# Patient Record
Sex: MALE | Race: WHITE | NOT HISPANIC OR LATINO
[De-identification: names, ages, dates, MRNs, and addresses within clinical notes are randomized per-mention and may not be internally consistent; named-entity substitution may affect disease eponyms.]

---

## 2017-04-17 ENCOUNTER — APPOINTMENT (OUTPATIENT)
Dept: CARDIOLOGY | Facility: CLINIC | Age: 52
End: 2017-04-17

## 2017-04-17 ENCOUNTER — NON-APPOINTMENT (OUTPATIENT)
Age: 52
End: 2017-04-17

## 2017-04-17 VITALS
HEIGHT: 70 IN | OXYGEN SATURATION: 97 % | SYSTOLIC BLOOD PRESSURE: 130 MMHG | DIASTOLIC BLOOD PRESSURE: 90 MMHG | WEIGHT: 294 LBS | HEART RATE: 82 BPM | BODY MASS INDEX: 42.09 KG/M2

## 2017-04-17 VITALS — SYSTOLIC BLOOD PRESSURE: 126 MMHG | DIASTOLIC BLOOD PRESSURE: 80 MMHG

## 2017-04-17 DIAGNOSIS — R07.89 OTHER CHEST PAIN: ICD-10-CM

## 2017-04-17 DIAGNOSIS — E66.01 MORBID (SEVERE) OBESITY DUE TO EXCESS CALORIES: ICD-10-CM

## 2017-04-17 RX ORDER — ASPIRIN 81 MG
81 TABLET, DELAYED RELEASE (ENTERIC COATED) ORAL DAILY
Qty: 30 | Refills: 2 | Status: ACTIVE | COMMUNITY

## 2017-04-18 LAB
ALBUMIN SERPL ELPH-MCNC: 4.4 G/DL
ALP BLD-CCNC: 83 U/L
ALT SERPL-CCNC: 49 U/L
ANION GAP SERPL CALC-SCNC: 15 MMOL/L
AST SERPL-CCNC: 67 U/L
BASOPHILS # BLD AUTO: 0.02 K/UL
BASOPHILS NFR BLD AUTO: 0.2 %
BILIRUB SERPL-MCNC: 0.9 MG/DL
BUN SERPL-MCNC: 24 MG/DL
CALCIUM SERPL-MCNC: 9.7 MG/DL
CHLORIDE SERPL-SCNC: 103 MMOL/L
CHOLEST SERPL-MCNC: 167 MG/DL
CHOLEST/HDLC SERPL: 5.2 RATIO
CO2 SERPL-SCNC: 27 MMOL/L
CREAT SERPL-MCNC: 1.31 MG/DL
EOSINOPHIL # BLD AUTO: 0.41 K/UL
EOSINOPHIL NFR BLD AUTO: 4.9 %
GLUCOSE SERPL-MCNC: 98 MG/DL
HBA1C MFR BLD HPLC: 6.3 %
HCT VFR BLD CALC: 42.4 %
HDLC SERPL-MCNC: 32 MG/DL
HGB BLD-MCNC: 14.3 G/DL
IMM GRANULOCYTES NFR BLD AUTO: 0.2 %
LDLC SERPL CALC-MCNC: 80 MG/DL
LYMPHOCYTES # BLD AUTO: 2.06 K/UL
LYMPHOCYTES NFR BLD AUTO: 24.7 %
MAN DIFF?: NORMAL
MCHC RBC-ENTMCNC: 32.5 PG
MCHC RBC-ENTMCNC: 33.7 GM/DL
MCV RBC AUTO: 96.4 FL
MONOCYTES # BLD AUTO: 0.67 K/UL
MONOCYTES NFR BLD AUTO: 8 %
NEUTROPHILS # BLD AUTO: 5.16 K/UL
NEUTROPHILS NFR BLD AUTO: 62 %
PLATELET # BLD AUTO: 302 K/UL
POTASSIUM SERPL-SCNC: 4.4 MMOL/L
PROT SERPL-MCNC: 7 G/DL
PSA FREE FLD-MCNC: 40 %
PSA FREE SERPL-MCNC: 0.32 NG/ML
PSA SERPL-MCNC: 0.79 NG/ML
RBC # BLD: 4.4 M/UL
RBC # FLD: 13.2 %
SODIUM SERPL-SCNC: 145 MMOL/L
TRIGL SERPL-MCNC: 274 MG/DL
TSH SERPL-ACNC: 4.45 UIU/ML
WBC # FLD AUTO: 8.34 K/UL

## 2017-04-20 ENCOUNTER — LABORATORY RESULT (OUTPATIENT)
Age: 52
End: 2017-04-20

## 2017-04-21 ENCOUNTER — APPOINTMENT (OUTPATIENT)
Dept: CARDIOLOGY | Facility: CLINIC | Age: 52
End: 2017-04-21

## 2017-04-24 ENCOUNTER — APPOINTMENT (OUTPATIENT)
Dept: CARDIOLOGY | Facility: CLINIC | Age: 52
End: 2017-04-24

## 2017-04-24 ENCOUNTER — MEDICATION RENEWAL (OUTPATIENT)
Age: 52
End: 2017-04-24

## 2017-04-24 LAB
APPEARANCE: ABNORMAL
BILIRUBIN URINE: NEGATIVE
BLOOD URINE: NEGATIVE
COLOR: YELLOW
CREAT SPEC-SCNC: 158 MG/DL
GLUCOSE QUALITATIVE U: NORMAL MG/DL
KETONES URINE: NEGATIVE
LEUKOCYTE ESTERASE URINE: NEGATIVE
MICROALBUMIN 24H UR DL<=1MG/L-MCNC: 7.2 MG/DL
MICROALBUMIN/CREAT 24H UR-RTO: 45 UG/MG
NITRITE URINE: NEGATIVE
PH URINE: 5
PROTEIN URINE: NEGATIVE MG/DL
SPECIFIC GRAVITY URINE: 1.02
UROBILINOGEN URINE: 1 MG/DL

## 2017-05-08 ENCOUNTER — APPOINTMENT (OUTPATIENT)
Dept: CARDIOLOGY | Facility: CLINIC | Age: 52
End: 2017-05-08

## 2017-05-24 ENCOUNTER — APPOINTMENT (OUTPATIENT)
Dept: CARDIOLOGY | Facility: CLINIC | Age: 52
End: 2017-05-24

## 2017-08-09 ENCOUNTER — INPATIENT (INPATIENT)
Facility: HOSPITAL | Age: 52
LOS: 0 days | Discharge: HOME | End: 2017-08-10
Attending: INTERNAL MEDICINE | Admitting: INTERNAL MEDICINE
Payer: COMMERCIAL

## 2017-08-09 VITALS
HEART RATE: 88 BPM | TEMPERATURE: 99 F | OXYGEN SATURATION: 99 % | HEIGHT: 70 IN | SYSTOLIC BLOOD PRESSURE: 151 MMHG | WEIGHT: 296.08 LBS | DIASTOLIC BLOOD PRESSURE: 83 MMHG | RESPIRATION RATE: 18 BRPM

## 2017-08-09 DIAGNOSIS — Z95.5 PRESENCE OF CORONARY ANGIOPLASTY IMPLANT AND GRAFT: Chronic | ICD-10-CM

## 2017-08-09 DIAGNOSIS — Z98.890 OTHER SPECIFIED POSTPROCEDURAL STATES: Chronic | ICD-10-CM

## 2017-08-09 LAB
ALBUMIN SERPL ELPH-MCNC: 3.7 G/DL — SIGNIFICANT CHANGE UP (ref 3.3–5)
ALP SERPL-CCNC: 110 U/L — SIGNIFICANT CHANGE UP (ref 40–120)
ALT FLD-CCNC: 39 U/L — SIGNIFICANT CHANGE UP (ref 12–78)
ANION GAP SERPL CALC-SCNC: 8 MMOL/L — SIGNIFICANT CHANGE UP (ref 5–17)
AST SERPL-CCNC: 41 U/L — HIGH (ref 15–37)
BASOPHILS # BLD AUTO: 0.1 K/UL — SIGNIFICANT CHANGE UP (ref 0–0.2)
BASOPHILS NFR BLD AUTO: 0.8 % — SIGNIFICANT CHANGE UP (ref 0–2)
BILIRUB SERPL-MCNC: 0.9 MG/DL — SIGNIFICANT CHANGE UP (ref 0.2–1.2)
BUN SERPL-MCNC: 26 MG/DL — HIGH (ref 7–23)
CALCIUM SERPL-MCNC: 9.3 MG/DL — SIGNIFICANT CHANGE UP (ref 8.5–10.1)
CHLORIDE SERPL-SCNC: 106 MMOL/L — SIGNIFICANT CHANGE UP (ref 96–108)
CK SERPL-CCNC: 255 U/L — SIGNIFICANT CHANGE UP (ref 26–308)
CO2 SERPL-SCNC: 27 MMOL/L — SIGNIFICANT CHANGE UP (ref 22–31)
CREAT SERPL-MCNC: 1.61 MG/DL — HIGH (ref 0.5–1.3)
D DIMER BLD IA.RAPID-MCNC: 155 NG/ML DDU — SIGNIFICANT CHANGE UP
EOSINOPHIL # BLD AUTO: 0.4 K/UL — SIGNIFICANT CHANGE UP (ref 0–0.5)
EOSINOPHIL NFR BLD AUTO: 4.1 % — SIGNIFICANT CHANGE UP (ref 0–6)
GLUCOSE SERPL-MCNC: 129 MG/DL — HIGH (ref 70–99)
HCT VFR BLD CALC: 42.8 % — SIGNIFICANT CHANGE UP (ref 39–50)
HGB BLD-MCNC: 15.2 G/DL — SIGNIFICANT CHANGE UP (ref 13–17)
INR BLD: 0.95 RATIO — SIGNIFICANT CHANGE UP (ref 0.88–1.16)
LYMPHOCYTES # BLD AUTO: 2.6 K/UL — SIGNIFICANT CHANGE UP (ref 1–3.3)
LYMPHOCYTES # BLD AUTO: 26 % — SIGNIFICANT CHANGE UP (ref 13–44)
MCHC RBC-ENTMCNC: 32.6 PG — SIGNIFICANT CHANGE UP (ref 27–34)
MCHC RBC-ENTMCNC: 35.5 GM/DL — SIGNIFICANT CHANGE UP (ref 32–36)
MCV RBC AUTO: 91.9 FL — SIGNIFICANT CHANGE UP (ref 80–100)
MONOCYTES # BLD AUTO: 0.9 K/UL — SIGNIFICANT CHANGE UP (ref 0–0.9)
MONOCYTES NFR BLD AUTO: 9.2 % — SIGNIFICANT CHANGE UP (ref 2–14)
NEUTROPHILS # BLD AUTO: 6.1 K/UL — SIGNIFICANT CHANGE UP (ref 1.8–7.4)
NEUTROPHILS NFR BLD AUTO: 59.8 % — SIGNIFICANT CHANGE UP (ref 43–77)
PLATELET # BLD AUTO: 350 K/UL — SIGNIFICANT CHANGE UP (ref 150–400)
POTASSIUM SERPL-MCNC: 3.4 MMOL/L — LOW (ref 3.5–5.3)
POTASSIUM SERPL-SCNC: 3.4 MMOL/L — LOW (ref 3.5–5.3)
PROT SERPL-MCNC: 7.3 GM/DL — SIGNIFICANT CHANGE UP (ref 6–8.3)
PROTHROM AB SERPL-ACNC: 10.2 SEC — SIGNIFICANT CHANGE UP (ref 9.8–12.7)
RBC # BLD: 4.66 M/UL — SIGNIFICANT CHANGE UP (ref 4.2–5.8)
RBC # FLD: 11.8 % — SIGNIFICANT CHANGE UP (ref 10.3–14.5)
SODIUM SERPL-SCNC: 141 MMOL/L — SIGNIFICANT CHANGE UP (ref 135–145)
TROPONIN I SERPL-MCNC: <0.015 NG/ML — SIGNIFICANT CHANGE UP (ref 0.01–0.04)
TROPONIN I SERPL-MCNC: <0.015 NG/ML — SIGNIFICANT CHANGE UP (ref 0.01–0.04)
WBC # BLD: 10.2 K/UL — SIGNIFICANT CHANGE UP (ref 3.8–10.5)
WBC # FLD AUTO: 10.2 K/UL — SIGNIFICANT CHANGE UP (ref 3.8–10.5)

## 2017-08-09 PROCEDURE — 99285 EMERGENCY DEPT VISIT HI MDM: CPT

## 2017-08-09 PROCEDURE — 71010: CPT | Mod: 26

## 2017-08-09 PROCEDURE — 93010 ELECTROCARDIOGRAM REPORT: CPT

## 2017-08-09 PROCEDURE — 93306 TTE W/DOPPLER COMPLETE: CPT | Mod: 26

## 2017-08-09 RX ORDER — LOSARTAN POTASSIUM 100 MG/1
100 TABLET, FILM COATED ORAL DAILY
Qty: 0 | Refills: 0 | Status: DISCONTINUED | OUTPATIENT
Start: 2017-08-09 | End: 2017-08-10

## 2017-08-09 RX ORDER — ATORVASTATIN CALCIUM 80 MG/1
40 TABLET, FILM COATED ORAL AT BEDTIME
Qty: 0 | Refills: 0 | Status: DISCONTINUED | OUTPATIENT
Start: 2017-08-09 | End: 2017-08-10

## 2017-08-09 RX ORDER — ENOXAPARIN SODIUM 100 MG/ML
40 INJECTION SUBCUTANEOUS EVERY 24 HOURS
Qty: 0 | Refills: 0 | Status: DISCONTINUED | OUTPATIENT
Start: 2017-08-09 | End: 2017-08-10

## 2017-08-09 RX ORDER — ALPRAZOLAM 0.25 MG
0.5 TABLET ORAL ONCE
Qty: 0 | Refills: 0 | Status: DISCONTINUED | OUTPATIENT
Start: 2017-08-09 | End: 2017-08-10

## 2017-08-09 RX ORDER — ASPIRIN/CALCIUM CARB/MAGNESIUM 324 MG
81 TABLET ORAL DAILY
Qty: 0 | Refills: 0 | Status: DISCONTINUED | OUTPATIENT
Start: 2017-08-09 | End: 2017-08-10

## 2017-08-09 RX ORDER — ASPIRIN/CALCIUM CARB/MAGNESIUM 324 MG
325 TABLET ORAL ONCE
Qty: 0 | Refills: 0 | Status: COMPLETED | OUTPATIENT
Start: 2017-08-09 | End: 2017-08-09

## 2017-08-09 RX ORDER — POTASSIUM CHLORIDE 20 MEQ
40 PACKET (EA) ORAL ONCE
Qty: 0 | Refills: 0 | Status: COMPLETED | OUTPATIENT
Start: 2017-08-09 | End: 2017-08-09

## 2017-08-09 RX ORDER — ONDANSETRON 8 MG/1
4 TABLET, FILM COATED ORAL EVERY 6 HOURS
Qty: 0 | Refills: 0 | Status: DISCONTINUED | OUTPATIENT
Start: 2017-08-09 | End: 2017-08-10

## 2017-08-09 RX ORDER — SODIUM CHLORIDE 9 MG/ML
1000 INJECTION INTRAMUSCULAR; INTRAVENOUS; SUBCUTANEOUS
Qty: 0 | Refills: 0 | Status: DISCONTINUED | OUTPATIENT
Start: 2017-08-09 | End: 2017-08-10

## 2017-08-09 RX ORDER — SODIUM CHLORIDE 9 MG/ML
3 INJECTION INTRAMUSCULAR; INTRAVENOUS; SUBCUTANEOUS ONCE
Qty: 0 | Refills: 0 | Status: COMPLETED | OUTPATIENT
Start: 2017-08-09 | End: 2017-08-09

## 2017-08-09 RX ORDER — CLOPIDOGREL BISULFATE 75 MG/1
75 TABLET, FILM COATED ORAL DAILY
Qty: 0 | Refills: 0 | Status: DISCONTINUED | OUTPATIENT
Start: 2017-08-09 | End: 2017-08-10

## 2017-08-09 RX ORDER — ACETAMINOPHEN 500 MG
650 TABLET ORAL EVERY 6 HOURS
Qty: 0 | Refills: 0 | Status: DISCONTINUED | OUTPATIENT
Start: 2017-08-09 | End: 2017-08-10

## 2017-08-09 RX ADMIN — CLOPIDOGREL BISULFATE 75 MILLIGRAM(S): 75 TABLET, FILM COATED ORAL at 21:58

## 2017-08-09 RX ADMIN — SODIUM CHLORIDE 150 MILLILITER(S): 9 INJECTION INTRAMUSCULAR; INTRAVENOUS; SUBCUTANEOUS at 10:05

## 2017-08-09 RX ADMIN — LOSARTAN POTASSIUM 100 MILLIGRAM(S): 100 TABLET, FILM COATED ORAL at 10:02

## 2017-08-09 RX ADMIN — ENOXAPARIN SODIUM 40 MILLIGRAM(S): 100 INJECTION SUBCUTANEOUS at 10:01

## 2017-08-09 RX ADMIN — ATORVASTATIN CALCIUM 40 MILLIGRAM(S): 80 TABLET, FILM COATED ORAL at 21:58

## 2017-08-09 RX ADMIN — Medication 325 MILLIGRAM(S): at 07:18

## 2017-08-09 RX ADMIN — SODIUM CHLORIDE 3 MILLILITER(S): 9 INJECTION INTRAMUSCULAR; INTRAVENOUS; SUBCUTANEOUS at 03:30

## 2017-08-09 RX ADMIN — Medication 40 MILLIEQUIVALENT(S): at 10:02

## 2017-08-09 NOTE — H&P ADULT - NSHPLABSRESULTS_GEN_ALL_CORE
15.2   10.2  )-----------( 350      ( 09 Aug 2017 00:50 )             42.8     141  |  106  |  26<H>  ----------------------------<  129<H>  3.4<L>   |  27  |  1.61<H>    Ca    9.3      09 Aug 2017 00:50    TPro  7.3  /  Alb  3.7  /  TBili  0.9  /  DBili  x   /  AST  41<H>  /  ALT  39  /  AlkPhos  110  08-09    CARDIAC MARKERS ( 09 Aug 2017 03:32 )  <0.015 ng/mL / x     / x     / x     / x      CARDIAC MARKERS ( 09 Aug 2017 00:50 )  <0.015 ng/mL / x     / 255 U/L / x     / x        LIVER FUNCTIONS - ( 09 Aug 2017 00:50 )  Alb: 3.7 g/dL / Pro: 7.3 gm/dL / ALK PHOS: 110 U/L / ALT: 39 U/L / AST: 41 U/L / GGT: x           PT/INR - ( 09 Aug 2017 00:50 )   PT: 10.2 sec;   INR: 0.95 ratio

## 2017-08-09 NOTE — H&P ADULT - HISTORY OF PRESENT ILLNESS
Pt is a 53 yo M hx of CAD, MI s/p stent in 2014 with c/o chest pain.  Pt states that yesterday he had episodes of chest pain in the Right upper chest that were sharp and got worse with deep breaths, would come intermittently.  He was not concerned that this was related to his heart.  He later developed some pain in the upper back, states it reminded him of his heart attack and got nervous, he checked his blood pressure and it was 170/100 so he came to the ED.  No current chest pain, sob, n/v, diaphoresis, palpitations. No fever, chills, cough, no LE pain or swelling. He is concerned about the frequent PVCs, states that he never had that before.     In the ED VS were wnl, labs remarkable for 2 negative troponins, Cr of 1.6 and K of 3.4.  His EKG was without any acute ischemic changes however had frequent PVCs. CXR was done, official read pending however lung fields appear clear.  He was given 325 mg aspirin.

## 2017-08-09 NOTE — ED PROVIDER NOTE - OBJECTIVE STATEMENT
53 y/o M with PMHx of CAD with 1 stent placed by Dr. Long in 2014 presents to the ED with 3 days of intermittent CP. Tonight Pt had right sided CP, became anxious and took BP which was 160/90. Pt then became more anxious and came to the ED. Pt is currently asymptomatic.

## 2017-08-09 NOTE — CONSULT NOTE ADULT - SUBJECTIVE AND OBJECTIVE BOX
Patient is a 52y old  Male who presents with a chief complaint of chest pain.  HPI:  Pt is a 51 yo M hx of CAD, MI s/p stent in  with c/o chest pain.  Pt states that yesterday he had episodes of chest pain in the Right upper chest that were sharp and got worse with deep breaths, would come intermittently.  He was not concerned that this was related to his heart.  He later developed some pain in the upper back, states it reminded him of his heart attack and got nervous, he checked his blood pressure and it was 170/100 so he came to the ED.  No current chest pain, sob, n/v, diaphoresis, palpitations. No fever, chills, cough, no LE pain or swelling. He is concerned about the frequent PVCs, states that he never had that before. CP worse when he was lying in bed at night.    In the ED VS were wnl, labs remarkable for 2 negative troponins, Cr of 1.6 and K of 3.4.  His EKG was without any acute ischemic changes however had frequent PVCs. CXR was done, official read pending however lung fields appear clear.  He was given 325 mg aspirin.         PAST MEDICAL & SURGICAL HISTORY:  CAD (coronary artery disease)  S/P drug eluting coronary stent placement  H/O shoulder surgery      MEDICATIONS  (STANDING):  aspirin enteric coated 81 milliGRAM(s) Oral daily  atorvastatin 40 milliGRAM(s) Oral at bedtime  losartan 100 milliGRAM(s) Oral daily  clopidogrel Tablet 75 milliGRAM(s) Oral daily  enoxaparin Injectable 40 milliGRAM(s) SubCutaneous every 24 hours  sodium chloride 0.9%. 1000 milliLiter(s) (150 mL/Hr) IV Continuous <Continuous>    MEDICATIONS  (PRN):  acetaminophen   Tablet 650 milliGRAM(s) Oral every 6 hours PRN pain  ondansetron Injectable 4 milliGRAM(s) IV Push every 6 hours PRN Nausea      FAMILY HISTORY:  Family history of premature coronary artery disease (Father): Father had 3 vessel CABG at age 39 and  at 61      SOCIAL HISTORY:  non smoker, no alcohol use   REVIEW OF SYSTEMS:  CONSTITUTIONAL:  No night sweats.  No fatigue, malaise, lethargy.  No fever or chills.  HEENT:  Eyes:  No visual changes.  No eye pain.      ENT:  No runny nose.  No epistaxis.  No sinus pain.  No sore throat.  No odynophagia.  No ear pain.  No congestion.  RESPIRATORY:  No cough.  No wheeze.  No hemoptysis.  No shortness of breath.  CARDIOVASCULAR:  No chest pains.  No palpitations. No shortness of breath, No orthopnea or PND.  GASTROINTESTINAL:  No abdominal pain.  No nausea or vomiting.  No diarrhea or constipation.  No hematemesis.  No hematochezia.  No melena.  GENITOURINARY:  No urgency.  No frequency.  No dysuria.  No hematuria.  No obstructive symptoms.  No discharge.  No pain.  No significant abnormal bleeding.  MUSCULOSKELETAL:  No musculoskeletal pain.  No joint swelling.  No arthritis.  NEUROLOGICAL:  No tingling or numbness or weakness.  PSYCHIATRIC:  No confusion  SKIN:  No rashes.  No lesions.  No wounds.  ENDOCRINE:  No unexplained weight loss.  No polydipsia.  No polyuria.  No polyphagia.  HEMATOLOGIC:  No anemia.  No purpura.  No petechiae.  No prolonged or excessive bleeding.   ALLERGIC AND IMMUNOLOGIC:  No pruritus.  No swelling.         Vital Signs Last 24 Hrs  T(C): 36.7 (09 Aug 2017 09:57), Max: 37.1 (09 Aug 2017 00:24)  T(F): 98 (09 Aug 2017 09:57), Max: 98.8 (09 Aug 2017 00:24)  HR: 78 (09 Aug 2017 10:08) (61 - 100)  BP: 120/90 (09 Aug 2017 10:08) (115/70 - 152/86)  BP(mean): --  RR: 14 (09 Aug 2017 09:57) (12 - 18)  SpO2: 98% (09 Aug 2017 09:57) (97% - 100%)    PHYSICAL EXAM-    Constitutional: obese, in no distress    Head: Head is normocephalic and atraumatic.      Neck: The patient's neck is supple without enlargement, has no palpable thyromegaly nor thyroid nodules and has no jugular venous distention. No audible carotid bruits. There are strong carotid pulses bilaterally. No JVD.     Cardiovascular: Regular rate and rhythm without S3, S4. No murmurs or rubs are appreciated.      Respiratory: Breath sounds are normal. No rales. No wheezing.    Abdomen: Soft, nontender, nondistended with positive bowel sounds.      Extremity: No tenderness. No  pitting edema No skin discoloration No clubbing No cyanosis.     Neurologic: The patient is alert and oriented.      Skin: No rash, no obvious lesions noted.      Psychiatric: The patient appears to be emotionally stable.      INTERPRETATION OF TELEMETRY: sinus rythm    ECG: sinus rythm, normal axis, no ST T changes, bigeminy.    I&O's Detail      LABS:                        15.2   10.2  )-----------( 350      ( 09 Aug 2017 00:50 )             42.8         141  |  106  |  26<H>  ----------------------------<  129<H>  3.4<L>   |  27  |  1.61<H>    Ca    9.3      09 Aug 2017 00:50    TPro  7.3  /  Alb  3.7  /  TBili  0.9  /  DBili  x   /  AST  41<H>  /  ALT  39  /  AlkPhos  110  08-    CARDIAC MARKERS ( 09 Aug 2017 03:32 )  <0.015 ng/mL / x     / x     / x     / x      CARDIAC MARKERS ( 09 Aug 2017 00:50 )  <0.015 ng/mL / x     / 255 U/L / x     / x          PT/INR - ( 09 Aug 2017 00:50 )   PT: 10.2 sec;   INR: 0.95 ratio             I&O's Summary    BNP  RADIOLOGY & ADDITIONAL STUDIES:  < from: 12 Lead ECG (17 @ 00:29) >  Ventricular Rate 96 BPM    Atrial Rate 96 BPM    P-R Interval 156 ms    QRS Duration 90 ms    Q-T Interval 370 ms    QTC Calculation(Bezet) 467 ms    P Axis 36 degrees    R Axis 24 degrees    T Axis 39 degrees    Diagnosis Line Sinus rhythm with frequent Premature ventricular complexes  Borderline QT interval  Possible Inferior infarct , age undetermined  Abnormal ECG  When compared with ECG of 10-MAR-2014 06:52,  Premature ventricular complexes are now Present  T wave inversion no longer evident in Inferior leads  Confirmed by FAYE FORTUNE, JDA ([id number]) on 2017 10:10:34 AM    < end of copied text >

## 2017-08-09 NOTE — ED ADULT NURSE REASSESSMENT NOTE - NS ED NURSE REASSESS COMMENT FT1
Pt rec'd from JEREMIAH Stover. Pt is alert and orient x 4. Cardiac monitoring in place, NSR. No chest pain at this time. Morning meds given for HTN. IVF initiated as per order. Awaiting for bed assignment. Safety and comfort maintained. NPO x meds at this time.

## 2017-08-09 NOTE — H&P ADULT - NSHPPHYSICALEXAM_GEN_ALL_CORE
Constitutional: NAD, well developed  HEENT: EOMI, Normal Hearing, MMM  Neck:  No JVD  Back: Normal spine flexure, No CVA tenderness  Respiratory: CTABL except mild rales in L base  Cardiovascular: S1 and S2, RRR, no M/G/R  Gastrointestinal: BS+, soft, NT/ND  Extremities: No peripheral edema  Vascular: 2+ peripheral pulses  Neurological: A/O x 3, no focal deficits  Psychiatric: Normal mood, normal affect  Musculoskeletal: 5/5 strength b/l upper and lower extremities  Skin: No rashes

## 2017-08-09 NOTE — H&P ADULT - FAMILY HISTORY
Father  Still living? No  Family history of premature coronary artery disease, Age at diagnosis: Age Unknown

## 2017-08-09 NOTE — H&P ADULT - ASSESSMENT
Pt is a 51 yo M hx of CAD, MI s/p stent in 2014 with c/o chest pain.  Pt states that yesterday he had episodes of chest pain in the Right upper chest that were sharp and got worse with deep breaths, would come intermittently.  He was not concerned that this was related to his heart.  He later developed some pain in the upper back, states it reminded him of his heart attack and got nervous, he checked his blood pressure and it was 170/100 so he came to the ED.  No current chest pain, sob, n/v, diaphoresis, palpitations. No fever, chills, cough, no LE pain or swelling. He is concerned about the frequent PVCs, states that he never had that before.     In the ED VS were wnl, labs remarkable for 2 negative troponins, Cr of 1.6 and K of 3.4.  His EKG was without any acute ischemic changes however had frequent PVCs. CXR was done, official read pending however lung fields appear clear.  He was given 325 mg aspirin.    # Atypical Chest pain   - most likely 2/2 pleurisy. R/O ACS  - WELLS Score 0 - would not evaluate for PE  - lungs clear on CXR     # Frequent PVCs  # Hypokalemia  # FERCHO  # Hx CAD s/p stent    Plan  Admit to telemetry to hospitalist service with cardiology consult  Obtain 2D echo  Defer decision on further ischemic evaluation to cardiology service  Replace lytes  IVF  recheck BMP and mag in AM  continue ASA 81, PLVX 75, Lipitor 40, Losartan 100  If PVCs persist will start BB  Hold HCTZ d/t FERCHO  NPO except meds pending decision for further ischemic eval.  DVT ppx with Lovenox

## 2017-08-09 NOTE — ED ADULT NURSE NOTE - OBJECTIVE STATEMENT
Pt presents to ER c/o right anterior chest pain, denies SOB. Onset of symptoms was 2 days ago while sitting and symptoms have been intermittent ever since. Pt reports hx of MI with stent 3 years ago. Denies radiating pain or back pain. AO x 3 oriented to baseline, normal breathing pattern with no difficulty.

## 2017-08-09 NOTE — CONSULT NOTE ADULT - ASSESSMENT
Chest pain - atypical , likely musculoskeletal from coughing with his URTI- so far cardiac enzymes and EKG did not reveal any ischemic changes.  His last stress test from 9/17 From [facility] showed small fixed defect in the inferior wall.  f/u with Dr Long in one week in office.    PVCs- replete potassium.  None on tele since he presented to 3 N floor.    HTN, hyperlipidemia- cotinue outpt meds.    CAD, s/p RCA PCI in 2014 after a STEMI- cotinue outpt meds including ASA and plavix.    Other medical issues- Management pre primary team.     Thank you for allowing me to participate in the care of this patient. Please feel free to contact me with any questions.

## 2017-08-09 NOTE — ED ADULT NURSE NOTE - CHPI ED SYMPTOMS NEG
no fever/no dizziness/no vomiting/no back pain/no syncope/no shortness of breath/no nausea/no cough/no chills/no diaphoresis

## 2017-08-09 NOTE — ED ADULT NURSE REASSESSMENT NOTE - NS ED NURSE REASSESS COMMENT FT1
Pt resting comfortable in bed, denies CP/SOB. pt updated on plan of care and lab results. Repeat troponin pending

## 2017-08-09 NOTE — ED ADULT NURSE REASSESSMENT NOTE - NS ED NURSE REASSESS COMMENT FT1
Pt resting comfortable in bed, denies CP when sitting up, c/o CP when laying flat. Head of bed elevated, pt updated on plan of care and lab results

## 2017-08-09 NOTE — ED PROVIDER NOTE - PROGRESS NOTE DETAILS
pt with 2 negative enzymes but worried about going home, with ho cad, stent will admit get cardiology consult for acs

## 2017-08-10 ENCOUNTER — TRANSCRIPTION ENCOUNTER (OUTPATIENT)
Age: 52
End: 2017-08-10

## 2017-08-10 VITALS
RESPIRATION RATE: 17 BRPM | OXYGEN SATURATION: 99 % | HEART RATE: 72 BPM | TEMPERATURE: 97 F | SYSTOLIC BLOOD PRESSURE: 135 MMHG | DIASTOLIC BLOOD PRESSURE: 75 MMHG

## 2017-08-10 LAB
ANION GAP SERPL CALC-SCNC: 6 MMOL/L — SIGNIFICANT CHANGE UP (ref 5–17)
BUN SERPL-MCNC: 20 MG/DL — SIGNIFICANT CHANGE UP (ref 7–23)
CALCIUM SERPL-MCNC: 9 MG/DL — SIGNIFICANT CHANGE UP (ref 8.5–10.1)
CHLORIDE SERPL-SCNC: 109 MMOL/L — HIGH (ref 96–108)
CO2 SERPL-SCNC: 28 MMOL/L — SIGNIFICANT CHANGE UP (ref 22–31)
CREAT SERPL-MCNC: 1.4 MG/DL — HIGH (ref 0.5–1.3)
GLUCOSE SERPL-MCNC: 101 MG/DL — HIGH (ref 70–99)
MAGNESIUM SERPL-MCNC: 2 MG/DL — SIGNIFICANT CHANGE UP (ref 1.6–2.6)
POTASSIUM SERPL-MCNC: 4.2 MMOL/L — SIGNIFICANT CHANGE UP (ref 3.5–5.3)
POTASSIUM SERPL-SCNC: 4.2 MMOL/L — SIGNIFICANT CHANGE UP (ref 3.5–5.3)
SODIUM SERPL-SCNC: 143 MMOL/L — SIGNIFICANT CHANGE UP (ref 135–145)

## 2017-08-10 RX ADMIN — Medication 81 MILLIGRAM(S): at 12:04

## 2017-08-10 RX ADMIN — LOSARTAN POTASSIUM 100 MILLIGRAM(S): 100 TABLET, FILM COATED ORAL at 05:42

## 2017-08-10 NOTE — DISCHARGE NOTE ADULT - MEDICATION SUMMARY - MEDICATIONS TO TAKE
I will START or STAY ON the medications listed below when I get home from the hospital:    aspirin 81 mg oral tablet  -- 1 tab(s) by mouth once a day  -- Indication: For CAD (coronary artery disease)    Lipitor 40 mg oral tablet  -- 1 tab(s) by mouth once a day  -- Indication: For Cholesterol    losartan-hydrochlorothiazide 100mg-12.5mg oral tablet  -- 1 tab(s) by mouth once a day  -- Indication: For Blood pressure    Plavix 75 mg oral tablet  -- 1 tab(s) by mouth once a day  -- Indication: For CAD (coronary artery disease)

## 2017-08-10 NOTE — DISCHARGE NOTE ADULT - PATIENT PORTAL LINK FT
“You can access the Follow[facility] Patient Portal, offered by [facility], by registering with the following website: [email or web address]”

## 2017-08-10 NOTE — DISCHARGE NOTE ADULT - CARE PROVIDER_API CALL
Branden Long), Cardiovascular Disease; Internal Medicine; Interventional Cardiology  [address] [place] [address]  Phone: [phone]  Fax: [phone]    Mukund Levin), Family Medicine  [address] [place] [address]  Phone: [phone]  Fax: [phone]

## 2017-08-10 NOTE — DISCHARGE NOTE ADULT - CARE PROVIDERS DIRECT ADDRESSES
,[email or web address]

## 2017-08-10 NOTE — DISCHARGE NOTE ADULT - HOSPITAL COURSE
CC: Chest pain  Subjective: Chest pain better today.  No events overnight , PVCs decreased on tele.   ROS: Negative exceot as above.    Vital Signs Last 24 Hrs  T(C): 36.3 (10 Aug 2017 04:50), Max: 36.9 (09 Aug 2017 17:23)  T(F): 97.3 (10 Aug 2017 04:50), Max: 98.4 (09 Aug 2017 17:23)  HR: 76 (10 Aug 2017 05:39) (64 - 84)  BP: 141/85 (10 Aug 2017 05:39) (118/53 - 142/81)  BP(mean): --  RR: 16 (10 Aug 2017 04:50) (16 - 18)  SpO2: 99% (10 Aug 2017 04:50) (97% - 99%)    Physical Exam:   Constitutional: NAD, well developed  HEENT: EOMI, Normal Hearing, MMM  Neck:  No JVD  Back: Normal spine flexure, No CVA tenderness  Respiratory: CTABL except mild rales in L base  Cardiovascular: S1 and S2, RRR, no M/G/R  Gastrointestinal: BS+, soft, NT/ND  Extremities: No peripheral edema  Vascular: 2+ peripheral pulses  Neurological: A/O x 3, no focal deficits  Psychiatric: Normal mood, normal affect  Musculoskeletal: 5/5 strength b/l upper and lower extremities  Skin: No rashes    Vital Signs Last 24 Hrs  T(C): 36.3 (10 Aug 2017 04:50), Max: 36.9 (09 Aug 2017 17:23)  T(F): 97.3 (10 Aug 2017 04:50), Max: 98.4 (09 Aug 2017 17:23)  HR: 76 (10 Aug 2017 05:39) (64 - 84)  BP: 141/85 (10 Aug 2017 05:39) (118/53 - 142/81)  BP(mean): --  RR: 16 (10 Aug 2017 04:50) (16 - 18)  SpO2: 99% (10 Aug 2017 04:50) (97% - 99%)                 15.2   10.2  )-----------( 350      ( 09 Aug 2017 00:50 )             42.8     143  |  109<H>  |  20  ----------------------------<  101<H>  4.2   |  28  |  1.40<H>    Ca    9.0      10 Aug 2017 06:38  Mg     2.0     08-10    TPro  7.3  /  Alb  3.7  /  TBili  0.9  /  DBili  x   /  AST  41<H>  /  ALT  39  /  AlkPhos  110  08-09    CARDIAC MARKERS ( 09 Aug 2017 03:32 )  <0.015 ng/mL / x     / x     / x     / x      CARDIAC MARKERS ( 09 Aug 2017 00:50 )  <0.015 ng/mL / x     / 255 U/L / x     / x        LIVER FUNCTIONS - ( 09 Aug 2017 00:50 )  Alb: 3.7 g/dL / Pro: 7.3 gm/dL / ALK PHOS: 110 U/L / ALT: 39 U/L / AST: 41 U/L / GGT: x           PT/INR - ( 09 Aug 2017 00:50 )   PT: 10.2 sec;   INR: 0.95 ratio      HPI/Course/Assessment and plan  Pt is a 51 yo M hx of CAD, MI s/p stent in 2014 with c/o chest pain.  Pt states that yesterday he had episodes of chest pain in the Right upper chest that were sharp and got worse with deep breaths, would come intermittently.  He was not concerned that this was related to his heart.  He later developed some pain in the upper back, states it reminded him of his heart attack and got nervous, he checked his blood pressure and it was 170/100 so he came to the ED.  No current chest pain, sob, n/v, diaphoresis, palpitations. No fever, chills, cough, no LE pain or swelling. He is concerned about the frequent PVCs, states that he never had that before.     In the ED VS were wnl, labs remarkable for 2 negative troponins, Cr of 1.6 and K of 3.4.  His EKG was without any acute ischemic changes however had frequent PVCs. CXR was done, official read pending however lung fields appear clear.  He was given 325 mg aspirin.    His FERCHO has resolved with IVF.  PVCs reduced after lytes replaced, chest pain has improved.     # Atypical Chest pain   - most likely 2/2 pleurisy. NO ACS.  - WELLS Score 0 - would not evaluate for PE  - lungs clear on CXR     # Frequent PVCs- improved  # Hypokalemia- resolved  # FERCHO- resolved  # Hx CAD s/p stent- stable, NO ACS. Echo no WMA- normal EF.     Plan  No plan for ischemic workup  continue ASA 81, PLVX 75, Lipitor 40, Losartan 100. restart HCTZ at dc.  discharge home, f/u with Dr. Long, Dr. Levin

## 2017-08-10 NOTE — DISCHARGE NOTE ADULT - CARE PLAN
Principal Discharge DX:	PVC (premature ventricular contraction)  Goal:	Resolved  Instructions for follow-up, activity and diet:	continue the same medications  Follow up with Dr. Long  Secondary Diagnosis:	CAD (coronary artery disease)

## 2017-08-14 DIAGNOSIS — I25.10 ATHEROSCLEROTIC HEART DISEASE OF NATIVE CORONARY ARTERY WITHOUT ANGINA PECTORIS: ICD-10-CM

## 2017-08-14 DIAGNOSIS — I25.2 OLD MYOCARDIAL INFARCTION: ICD-10-CM

## 2017-08-14 DIAGNOSIS — I49.3 VENTRICULAR PREMATURE DEPOLARIZATION: ICD-10-CM

## 2017-08-14 DIAGNOSIS — R09.1 PLEURISY: ICD-10-CM

## 2017-08-14 DIAGNOSIS — E87.6 HYPOKALEMIA: ICD-10-CM

## 2017-08-21 ENCOUNTER — TRANSCRIPTION ENCOUNTER (OUTPATIENT)
Age: 52
End: 2017-08-21

## 2017-10-18 ENCOUNTER — NON-APPOINTMENT (OUTPATIENT)
Age: 52
End: 2017-10-18

## 2017-10-19 ENCOUNTER — RESULT REVIEW (OUTPATIENT)
Age: 52
End: 2017-10-19

## 2017-10-19 ENCOUNTER — OUTPATIENT (OUTPATIENT)
Dept: OUTPATIENT SERVICES | Facility: HOSPITAL | Age: 52
LOS: 1 days | Discharge: HOME | End: 2017-10-19
Payer: COMMERCIAL

## 2017-10-19 VITALS
SYSTOLIC BLOOD PRESSURE: 142 MMHG | OXYGEN SATURATION: 99 % | DIASTOLIC BLOOD PRESSURE: 97 MMHG | HEIGHT: 70 IN | WEIGHT: 294.98 LBS | HEART RATE: 76 BPM | RESPIRATION RATE: 20 BRPM | TEMPERATURE: 98 F

## 2017-10-19 DIAGNOSIS — Z98.890 OTHER SPECIFIED POSTPROCEDURAL STATES: Chronic | ICD-10-CM

## 2017-10-19 DIAGNOSIS — Z95.5 PRESENCE OF CORONARY ANGIOPLASTY IMPLANT AND GRAFT: Chronic | ICD-10-CM

## 2017-10-19 PROCEDURE — 88312 SPECIAL STAINS GROUP 1: CPT | Mod: 26

## 2017-10-19 PROCEDURE — 88305 TISSUE EXAM BY PATHOLOGIST: CPT | Mod: 26

## 2017-10-19 RX ORDER — SODIUM CHLORIDE 9 MG/ML
1000 INJECTION INTRAMUSCULAR; INTRAVENOUS; SUBCUTANEOUS
Qty: 0 | Refills: 0 | Status: DISCONTINUED | OUTPATIENT
Start: 2017-10-19 | End: 2017-11-10

## 2017-10-19 RX ADMIN — SODIUM CHLORIDE 75 MILLILITER(S): 9 INJECTION INTRAMUSCULAR; INTRAVENOUS; SUBCUTANEOUS at 10:47

## 2017-10-23 LAB — SURGICAL PATHOLOGY FINAL REPORT - CH: SIGNIFICANT CHANGE UP

## 2017-11-03 DIAGNOSIS — Z12.11 ENCOUNTER FOR SCREENING FOR MALIGNANT NEOPLASM OF COLON: ICD-10-CM

## 2017-11-03 DIAGNOSIS — E11.40 TYPE 2 DIABETES MELLITUS WITH DIABETIC NEUROPATHY, UNSPECIFIED: ICD-10-CM

## 2017-11-03 DIAGNOSIS — I25.2 OLD MYOCARDIAL INFARCTION: ICD-10-CM

## 2017-11-03 DIAGNOSIS — Z79.82 LONG TERM (CURRENT) USE OF ASPIRIN: ICD-10-CM

## 2017-11-03 DIAGNOSIS — E78.5 HYPERLIPIDEMIA, UNSPECIFIED: ICD-10-CM

## 2017-11-03 DIAGNOSIS — E66.01 MORBID (SEVERE) OBESITY DUE TO EXCESS CALORIES: ICD-10-CM

## 2017-11-03 DIAGNOSIS — I10 ESSENTIAL (PRIMARY) HYPERTENSION: ICD-10-CM

## 2017-11-03 DIAGNOSIS — K64.8 OTHER HEMORRHOIDS: ICD-10-CM

## 2017-11-03 DIAGNOSIS — K64.4 RESIDUAL HEMORRHOIDAL SKIN TAGS: ICD-10-CM

## 2017-11-03 DIAGNOSIS — K29.80 DUODENITIS WITHOUT BLEEDING: ICD-10-CM

## 2017-11-03 DIAGNOSIS — Z79.02 LONG TERM (CURRENT) USE OF ANTITHROMBOTICS/ANTIPLATELETS: ICD-10-CM

## 2017-11-03 DIAGNOSIS — K29.50 UNSPECIFIED CHRONIC GASTRITIS WITHOUT BLEEDING: ICD-10-CM

## 2017-11-03 DIAGNOSIS — K57.30 DIVERTICULOSIS OF LARGE INTESTINE WITHOUT PERFORATION OR ABSCESS WITHOUT BLEEDING: ICD-10-CM

## 2019-02-26 ENCOUNTER — OUTPATIENT (OUTPATIENT)
Dept: OUTPATIENT SERVICES | Facility: HOSPITAL | Age: 54
LOS: 1 days | Discharge: HOME | End: 2019-02-26

## 2019-02-26 VITALS
WEIGHT: 266.98 LBS | TEMPERATURE: 98 F | SYSTOLIC BLOOD PRESSURE: 130 MMHG | DIASTOLIC BLOOD PRESSURE: 73 MMHG | HEIGHT: 69 IN | OXYGEN SATURATION: 99 % | HEART RATE: 65 BPM | RESPIRATION RATE: 16 BRPM

## 2019-02-26 DIAGNOSIS — E66.01 MORBID (SEVERE) OBESITY DUE TO EXCESS CALORIES: ICD-10-CM

## 2019-02-26 DIAGNOSIS — Z29.9 ENCOUNTER FOR PROPHYLACTIC MEASURES, UNSPECIFIED: ICD-10-CM

## 2019-02-26 DIAGNOSIS — Z95.5 PRESENCE OF CORONARY ANGIOPLASTY IMPLANT AND GRAFT: Chronic | ICD-10-CM

## 2019-02-26 DIAGNOSIS — Z98.890 OTHER SPECIFIED POSTPROCEDURAL STATES: Chronic | ICD-10-CM

## 2019-02-26 LAB
ALBUMIN SERPL ELPH-MCNC: 4.4 G/DL — SIGNIFICANT CHANGE UP (ref 3.3–5)
ANION GAP SERPL CALC-SCNC: 11 MMOL/L — SIGNIFICANT CHANGE UP (ref 5–17)
APPEARANCE UR: CLEAR — SIGNIFICANT CHANGE UP
APTT BLD: 34.5 SEC — SIGNIFICANT CHANGE UP (ref 27.5–36.3)
BACTERIA # UR AUTO: ABNORMAL
BASOPHILS # BLD AUTO: 0.03 K/UL — SIGNIFICANT CHANGE UP (ref 0–0.2)
BASOPHILS NFR BLD AUTO: 0.4 % — SIGNIFICANT CHANGE UP (ref 0–2)
BILIRUB UR-MCNC: NEGATIVE — SIGNIFICANT CHANGE UP
BLD GP AB SCN SERPL QL: SIGNIFICANT CHANGE UP
BLOOD GAS SOURCE: SIGNIFICANT CHANGE UP
BUN SERPL-MCNC: 26 MG/DL — HIGH (ref 7–23)
CALCIUM SERPL-MCNC: 9.4 MG/DL — SIGNIFICANT CHANGE UP (ref 8.5–10.1)
CHLORIDE SERPL-SCNC: 104 MMOL/L — SIGNIFICANT CHANGE UP (ref 96–108)
CO2 SERPL-SCNC: 27 MMOL/L — SIGNIFICANT CHANGE UP (ref 22–31)
COHGB MFR BLDV: 2.5 % — HIGH (ref 0–1.5)
COLOR SPEC: YELLOW — SIGNIFICANT CHANGE UP
COMMENT - URINE: SIGNIFICANT CHANGE UP
CREAT SERPL-MCNC: 1.45 MG/DL — HIGH (ref 0.5–1.3)
DIFF PNL FLD: ABNORMAL
EOSINOPHIL # BLD AUTO: 0.26 K/UL — SIGNIFICANT CHANGE UP (ref 0–0.5)
EOSINOPHIL NFR BLD AUTO: 3.2 % — SIGNIFICANT CHANGE UP (ref 0–6)
EPI CELLS # UR: SIGNIFICANT CHANGE UP
GLUCOSE SERPL-MCNC: 84 MG/DL — SIGNIFICANT CHANGE UP (ref 70–99)
GLUCOSE UR QL: NEGATIVE MG/DL — SIGNIFICANT CHANGE UP
HCT VFR BLD CALC: 43.1 % — SIGNIFICANT CHANGE UP (ref 39–50)
HGB BLD-MCNC: 15.2 G/DL — SIGNIFICANT CHANGE UP (ref 13–17)
HYALINE CASTS # UR AUTO: ABNORMAL /LPF
IMM GRANULOCYTES NFR BLD AUTO: 0.2 % — SIGNIFICANT CHANGE UP (ref 0–1.5)
INR BLD: 1.08 RATIO — SIGNIFICANT CHANGE UP (ref 0.88–1.16)
KETONES UR-MCNC: ABNORMAL
LEUKOCYTE ESTERASE UR-ACNC: NEGATIVE — SIGNIFICANT CHANGE UP
LYMPHOCYTES # BLD AUTO: 1.71 K/UL — SIGNIFICANT CHANGE UP (ref 1–3.3)
LYMPHOCYTES # BLD AUTO: 21.3 % — SIGNIFICANT CHANGE UP (ref 13–44)
MCHC RBC-ENTMCNC: 33.5 PG — SIGNIFICANT CHANGE UP (ref 27–34)
MCHC RBC-ENTMCNC: 35.3 GM/DL — SIGNIFICANT CHANGE UP (ref 32–36)
MCV RBC AUTO: 94.9 FL — SIGNIFICANT CHANGE UP (ref 80–100)
MONOCYTES # BLD AUTO: 0.68 K/UL — SIGNIFICANT CHANGE UP (ref 0–0.9)
MONOCYTES NFR BLD AUTO: 8.5 % — SIGNIFICANT CHANGE UP (ref 2–14)
NEUTROPHILS # BLD AUTO: 5.31 K/UL — SIGNIFICANT CHANGE UP (ref 1.8–7.4)
NEUTROPHILS NFR BLD AUTO: 66.4 % — SIGNIFICANT CHANGE UP (ref 43–77)
NITRITE UR-MCNC: NEGATIVE — SIGNIFICANT CHANGE UP
NRBC # BLD: 0 /100 WBCS — SIGNIFICANT CHANGE UP (ref 0–0)
PH UR: 5 — SIGNIFICANT CHANGE UP (ref 5–8)
PLATELET # BLD AUTO: 325 K/UL — SIGNIFICANT CHANGE UP (ref 150–400)
POTASSIUM SERPL-MCNC: 3.7 MMOL/L — SIGNIFICANT CHANGE UP (ref 3.5–5.3)
POTASSIUM SERPL-SCNC: 3.7 MMOL/L — SIGNIFICANT CHANGE UP (ref 3.5–5.3)
PROT UR-MCNC: 30 MG/DL
PROTHROM AB SERPL-ACNC: 12 SEC — SIGNIFICANT CHANGE UP (ref 10–12.9)
RBC # BLD: 4.54 M/UL — SIGNIFICANT CHANGE UP (ref 4.2–5.8)
RBC # FLD: 12.4 % — SIGNIFICANT CHANGE UP (ref 10.3–14.5)
RBC CASTS # UR COMP ASSIST: SIGNIFICANT CHANGE UP /HPF (ref 0–4)
SODIUM SERPL-SCNC: 142 MMOL/L — SIGNIFICANT CHANGE UP (ref 135–145)
SP GR SPEC: 1.02 — SIGNIFICANT CHANGE UP (ref 1.01–1.02)
TYPE + AB SCN PNL BLD: SIGNIFICANT CHANGE UP
UROBILINOGEN FLD QL: NEGATIVE MG/DL — SIGNIFICANT CHANGE UP
WBC # BLD: 8.01 K/UL — SIGNIFICANT CHANGE UP (ref 3.8–10.5)
WBC # FLD AUTO: 8.01 K/UL — SIGNIFICANT CHANGE UP (ref 3.8–10.5)
WBC UR QL: SIGNIFICANT CHANGE UP

## 2019-02-26 RX ORDER — ATORVASTATIN CALCIUM 80 MG/1
1 TABLET, FILM COATED ORAL
Qty: 0 | Refills: 0 | COMMUNITY

## 2019-02-26 RX ORDER — CLOPIDOGREL BISULFATE 75 MG/1
1 TABLET, FILM COATED ORAL
Qty: 0 | Refills: 0 | COMMUNITY

## 2019-02-26 RX ORDER — ASPIRIN/CALCIUM CARB/MAGNESIUM 324 MG
1 TABLET ORAL
Qty: 0 | Refills: 0 | COMMUNITY

## 2019-02-26 NOTE — H&P PST ADULT - TEACHING/LEARNING LEARNING PREFERENCES
video/written material/audio/individual instruction/pictorial/computer/internet/skill demonstration/group instruction/verbal instruction

## 2019-02-26 NOTE — H&P PST ADULT - NSANTHOSAYNRD_GEN_A_CORE
No. ROBINA screening performed.  STOP BANG Legend: 0-2 = LOW Risk; 3-4 = INTERMEDIATE Risk; 5-8 = HIGH Risk/Awaiting results of sleep study

## 2019-02-26 NOTE — H&P PST ADULT - ASSESSMENT
53 years old female present to PST prior to laparoscopic sleeve gastrectomy with Dr. Echeverria.    Plan   1. NPO after midnight  2. Use E-Z sponge as directed  3. Drink a quart of extra  fluids the day before your surgery.  4. CBC, BMP, Carboxyhemoglobin, Albumin, PT/PTT/INR, Urinalysis, Type and Screen sent to lab  5. EKG done at Dr. Pinzon  and CXR done at Dr. Vicente.    CAPRINI SCORE [CLOT]    AGE RELATED RISK FACTORS                                                       MOBILITY RELATED FACTORS  [x ] Age 41-60 years                                            (1 Point)                  [ ] Bed rest                                                        (1 Point)  [ ] Age: 61-74 years                                           (2 Points)                 [ ] Plaster cast                                                   (2 Points)  [ ] Age= 75 years                                              (3 Points)                 [ ] Bed bound for more than 72 hours                 (2 Points)    DISEASE RELATED RISK FACTORS                                               GENDER SPECIFIC FACTORS  [ ] Edema in the lower extremities                       (1 Point)                  [ ] Pregnancy                                                     (1 Point)  [ ] Varicose veins                                               (1 Point)                  [ ] Post-partum < 6 weeks                                   (1 Point)             [x ] BMI > 25 Kg/m2                                            (1 Point)                  [ ] Hormonal therapy  or oral contraception          (1 Point)                 [ ] Sepsis (in the previous month)                        (1 Point)                  [ ] History of pregnancy complications                 (1 point)  [ ] Pneumonia or serious lung disease                                               [ ] Unexplained or recurrent                     (1 Point)           (in the previous month)                               (1 Point)  [ ] Abnormal pulmonary function test                     (1 Point)                 SURGERY RELATED RISK FACTORS  [ ] Acute myocardial infarction                              (1 Point)                 [ ]  Section                                             (1 Point)  [ ] Congestive heart failure (in the previous month)  (1 Point)               [ ] Minor surgery                                                  (1 Point)   [ ] Inflammatory bowel disease                             (1 Point)                 [ ] Arthroscopic surgery                                        (2 Points)  [ ] Central venous access                                      (2 Points)                [ x] General surgery lasting more than 45 minutes   (2 Points)       [ ] Stroke (in the previous month)                          (5 Points)               [ ] Elective arthroplasty                                         (5 Points)            [ ] malignancy present or previous                      (2 points)                                                                                                                                   HEMATOLOGY RELATED FACTORS                                                 TRAUMA RELATED RISK FACTORS  [ ] Prior episodes of VTE                                     (3 Points)                 [ ] Fracture of the hip, pelvis, or leg                       (5 Points)  [ ] Positive family history for VTE                         (3 Points)                 [ ] Acute spinal cord injury (in the previous month)  (5 Points)  [ ] Prothrombin 81400 A                                     (3 Points)                 [ ] Paralysis  (less than 1 month)                             (5 Points)  [ ] Factor V Leiden                                             (3 Points)                  [ ] Multiple Trauma within 1 month                        (5 Points)  [ ] Lupus anticoagulants                                     (3 Points)                                                           [ ] Anticardiolipin antibodies                               (3 Points)                                                       [ ] High homocysteine in the blood                      (3 Points)                                             [ ] Other congenital or acquired thrombophilia      (3 Points)                                                [ ] Heparin induced thrombocytopenia                  (3 Points)                                          Total Score [     4     ]

## 2019-02-26 NOTE — H&P PST ADULT - HISTORY OF PRESENT ILLNESS
53 years old male with morbid obesity. Patient concerned about health. Heart attack in 2014. Difficulty keeping weight off. Planned Sleeve gastrectomy.

## 2019-02-26 NOTE — H&P PST ADULT - PMH
CAD (coronary artery disease)  1 stent to RCA  Heart attack  2014  History of kidney stones    Hyperlipidemia, unspecified hyperlipidemia type    Hypertension, unspecified type    Idiopathic neuropathy    Obesity    Type 2 diabetes mellitus with diabetic polyneuropathy, without long-term current use of insulin

## 2019-02-27 PROBLEM — I21.9 ACUTE MYOCARDIAL INFARCTION, UNSPECIFIED: Chronic | Status: ACTIVE | Noted: 2017-10-19

## 2019-02-27 PROBLEM — I25.10 ATHEROSCLEROTIC HEART DISEASE OF NATIVE CORONARY ARTERY WITHOUT ANGINA PECTORIS: Chronic | Status: ACTIVE | Noted: 2017-08-09

## 2019-03-06 ENCOUNTER — INPATIENT (INPATIENT)
Facility: HOSPITAL | Age: 54
LOS: 1 days | Discharge: HOME | End: 2019-03-08
Attending: SURGERY | Admitting: SURGERY
Payer: COMMERCIAL

## 2019-03-06 ENCOUNTER — RESULT REVIEW (OUTPATIENT)
Age: 54
End: 2019-03-06

## 2019-03-06 VITALS
WEIGHT: 265 LBS | OXYGEN SATURATION: 98 % | DIASTOLIC BLOOD PRESSURE: 81 MMHG | HEIGHT: 71 IN | HEART RATE: 72 BPM | SYSTOLIC BLOOD PRESSURE: 130 MMHG | RESPIRATION RATE: 14 BRPM | TEMPERATURE: 98 F

## 2019-03-06 DIAGNOSIS — Z98.890 OTHER SPECIFIED POSTPROCEDURAL STATES: Chronic | ICD-10-CM

## 2019-03-06 DIAGNOSIS — Z95.5 PRESENCE OF CORONARY ANGIOPLASTY IMPLANT AND GRAFT: Chronic | ICD-10-CM

## 2019-03-06 LAB
ANION GAP SERPL CALC-SCNC: 11 MMOL/L — SIGNIFICANT CHANGE UP (ref 5–17)
BUN SERPL-MCNC: 22 MG/DL — SIGNIFICANT CHANGE UP (ref 7–23)
CALCIUM SERPL-MCNC: 8.9 MG/DL — SIGNIFICANT CHANGE UP (ref 8.5–10.1)
CHLORIDE SERPL-SCNC: 105 MMOL/L — SIGNIFICANT CHANGE UP (ref 96–108)
CK SERPL-CCNC: 140 U/L — SIGNIFICANT CHANGE UP (ref 26–308)
CO2 SERPL-SCNC: 25 MMOL/L — SIGNIFICANT CHANGE UP (ref 22–31)
CREAT SERPL-MCNC: 1.85 MG/DL — HIGH (ref 0.5–1.3)
GLUCOSE SERPL-MCNC: 157 MG/DL — HIGH (ref 70–99)
HCT VFR BLD CALC: 43.7 % — SIGNIFICANT CHANGE UP (ref 39–50)
HGB BLD-MCNC: 15.3 G/DL — SIGNIFICANT CHANGE UP (ref 13–17)
MCHC RBC-ENTMCNC: 33.2 PG — SIGNIFICANT CHANGE UP (ref 27–34)
MCHC RBC-ENTMCNC: 35 GM/DL — SIGNIFICANT CHANGE UP (ref 32–36)
MCV RBC AUTO: 94.8 FL — SIGNIFICANT CHANGE UP (ref 80–100)
NRBC # BLD: 0 /100 WBCS — SIGNIFICANT CHANGE UP (ref 0–0)
PLATELET # BLD AUTO: 294 K/UL — SIGNIFICANT CHANGE UP (ref 150–400)
POTASSIUM SERPL-MCNC: 4 MMOL/L — SIGNIFICANT CHANGE UP (ref 3.5–5.3)
POTASSIUM SERPL-SCNC: 4 MMOL/L — SIGNIFICANT CHANGE UP (ref 3.5–5.3)
RBC # BLD: 4.61 M/UL — SIGNIFICANT CHANGE UP (ref 4.2–5.8)
RBC # FLD: 12.8 % — SIGNIFICANT CHANGE UP (ref 10.3–14.5)
SODIUM SERPL-SCNC: 141 MMOL/L — SIGNIFICANT CHANGE UP (ref 135–145)
TROPONIN I SERPL-MCNC: <0.015 NG/ML — SIGNIFICANT CHANGE UP (ref 0.01–0.04)
WBC # BLD: 15.67 K/UL — HIGH (ref 3.8–10.5)
WBC # FLD AUTO: 15.67 K/UL — HIGH (ref 3.8–10.5)

## 2019-03-06 PROCEDURE — 93306 TTE W/DOPPLER COMPLETE: CPT | Mod: 26

## 2019-03-06 PROCEDURE — 88307 TISSUE EXAM BY PATHOLOGIST: CPT | Mod: 26,59

## 2019-03-06 PROCEDURE — 93010 ELECTROCARDIOGRAM REPORT: CPT

## 2019-03-06 RX ORDER — SODIUM CHLORIDE 9 MG/ML
1000 INJECTION, SOLUTION INTRAVENOUS
Qty: 0 | Refills: 0 | Status: DISCONTINUED | OUTPATIENT
Start: 2019-03-06 | End: 2019-03-06

## 2019-03-06 RX ORDER — HYDROMORPHONE HYDROCHLORIDE 2 MG/ML
30 INJECTION INTRAMUSCULAR; INTRAVENOUS; SUBCUTANEOUS
Qty: 0 | Refills: 0 | Status: DISCONTINUED | OUTPATIENT
Start: 2019-03-06 | End: 2019-03-07

## 2019-03-06 RX ORDER — ENOXAPARIN SODIUM 100 MG/ML
40 INJECTION SUBCUTANEOUS EVERY 12 HOURS
Qty: 0 | Refills: 0 | Status: DISCONTINUED | OUTPATIENT
Start: 2019-03-06 | End: 2019-03-08

## 2019-03-06 RX ORDER — ONDANSETRON 8 MG/1
4 TABLET, FILM COATED ORAL EVERY 6 HOURS
Qty: 0 | Refills: 0 | Status: DISCONTINUED | OUTPATIENT
Start: 2019-03-06 | End: 2019-03-08

## 2019-03-06 RX ORDER — SODIUM CHLORIDE 9 MG/ML
1000 INJECTION, SOLUTION INTRAVENOUS
Qty: 0 | Refills: 0 | Status: DISCONTINUED | OUTPATIENT
Start: 2019-03-06 | End: 2019-03-08

## 2019-03-06 RX ORDER — PANTOPRAZOLE SODIUM 20 MG/1
40 TABLET, DELAYED RELEASE ORAL EVERY 24 HOURS
Qty: 0 | Refills: 0 | Status: DISCONTINUED | OUTPATIENT
Start: 2019-03-06 | End: 2019-03-08

## 2019-03-06 RX ORDER — METOPROLOL TARTRATE 50 MG
25 TABLET ORAL DAILY
Qty: 0 | Refills: 0 | Status: DISCONTINUED | OUTPATIENT
Start: 2019-03-06 | End: 2019-03-06

## 2019-03-06 RX ORDER — HYDROMORPHONE HYDROCHLORIDE 2 MG/ML
0.5 INJECTION INTRAMUSCULAR; INTRAVENOUS; SUBCUTANEOUS
Qty: 0 | Refills: 0 | Status: DISCONTINUED | OUTPATIENT
Start: 2019-03-06 | End: 2019-03-07

## 2019-03-06 RX ORDER — METOPROLOL TARTRATE 50 MG
25 TABLET ORAL DAILY
Qty: 0 | Refills: 0 | Status: DISCONTINUED | OUTPATIENT
Start: 2019-03-06 | End: 2019-03-08

## 2019-03-06 RX ORDER — NALOXONE HYDROCHLORIDE 4 MG/.1ML
0.1 SPRAY NASAL
Qty: 0 | Refills: 0 | Status: DISCONTINUED | OUTPATIENT
Start: 2019-03-06 | End: 2019-03-08

## 2019-03-06 RX ORDER — METOPROLOL TARTRATE 50 MG
5 TABLET ORAL ONCE
Qty: 0 | Refills: 0 | Status: COMPLETED | OUTPATIENT
Start: 2019-03-06 | End: 2019-03-06

## 2019-03-06 RX ORDER — OXYCODONE HYDROCHLORIDE 5 MG/1
10 TABLET ORAL ONCE
Qty: 0 | Refills: 0 | Status: DISCONTINUED | OUTPATIENT
Start: 2019-03-06 | End: 2019-03-06

## 2019-03-06 RX ORDER — HEPARIN SODIUM 5000 [USP'U]/ML
5000 INJECTION INTRAVENOUS; SUBCUTANEOUS ONCE
Qty: 0 | Refills: 0 | Status: COMPLETED | OUTPATIENT
Start: 2019-03-06 | End: 2019-03-06

## 2019-03-06 RX ORDER — APREPITANT 80 MG/1
80 CAPSULE ORAL ONCE
Qty: 0 | Refills: 0 | Status: COMPLETED | OUTPATIENT
Start: 2019-03-06 | End: 2019-03-06

## 2019-03-06 RX ADMIN — SODIUM CHLORIDE 150 MILLILITER(S): 9 INJECTION, SOLUTION INTRAVENOUS at 14:08

## 2019-03-06 RX ADMIN — HEPARIN SODIUM 5000 UNIT(S): 5000 INJECTION INTRAVENOUS; SUBCUTANEOUS at 10:11

## 2019-03-06 RX ADMIN — Medication 5 MILLIGRAM(S): at 16:09

## 2019-03-06 RX ADMIN — OXYCODONE HYDROCHLORIDE 10 MILLIGRAM(S): 5 TABLET ORAL at 10:12

## 2019-03-06 RX ADMIN — APREPITANT 80 MILLIGRAM(S): 80 CAPSULE ORAL at 10:11

## 2019-03-06 RX ADMIN — PANTOPRAZOLE SODIUM 40 MILLIGRAM(S): 20 TABLET, DELAYED RELEASE ORAL at 14:05

## 2019-03-06 RX ADMIN — HYDROMORPHONE HYDROCHLORIDE 0.5 MILLIGRAM(S): 2 INJECTION INTRAMUSCULAR; INTRAVENOUS; SUBCUTANEOUS at 15:09

## 2019-03-06 RX ADMIN — SODIUM CHLORIDE 75 MILLILITER(S): 9 INJECTION, SOLUTION INTRAVENOUS at 14:06

## 2019-03-06 RX ADMIN — HYDROMORPHONE HYDROCHLORIDE 30 MILLILITER(S): 2 INJECTION INTRAMUSCULAR; INTRAVENOUS; SUBCUTANEOUS at 13:58

## 2019-03-06 NOTE — PROGRESS NOTE ADULT - SUBJECTIVE AND OBJECTIVE BOX
Patient is a 53y old  Male with post op CP    HPI: 53 year old male with PMHx ROBINA,DM2, CAD S/P PCI , obesity presented today for laparoscopic gastric sleeve. C/O non radiating post op CP not associated with SOB, N/V. Wife at bedside      PAST MEDICAL & SURGICAL HISTORY:  Type 2 diabetes mellitus with diabetic polyneuropathy, without long-term current use of insulin  Idiopathic neuropathy  History of kidney stones  Obesity  Hyperlipidemia, unspecified hyperlipidemia type  Hypertension, unspecified type  Heart attack:   CAD (coronary artery disease): 1 stent to RCA  History of endoscopy  S/P drug eluting coronary stent placement  H/O shoulder surgery: Right      PREVIOUS CARDIAC WORKUP:      Echo:     ALLERGIES:    No Known Allergies       MEDICATIONS  (STANDING):  HYDROmorphone PCA (1 mG/mL) 30 milliLiter(s) PCA Continuous PCA Continuous  lactated ringers. 1000 milliLiter(s) (150 mL/Hr) IV Continuous <Continuous>  lactated ringers. 1000 milliLiter(s) (75 mL/Hr) IV Continuous <Continuous>  pantoprazole  Injectable 40 milliGRAM(s) IV Push every 24 hours    MEDICATIONS  (PRN):  HYDROmorphone PCA (1 mG/mL) Rescue Clinician Bolus 0.5 milliGRAM(s) IV Push every 15 minutes PRN for Pain Scale GREATER THAN 6  LORazepam   Injectable 0.5 milliGRAM(s) IntraMuscular every 6 hours PRN Nausea and/or Vomiting & anxiety  naloxone Injectable 0.1 milliGRAM(s) IV Push every 3 minutes PRN For ANY of the following changes in patient status:  A. RR LESS THAN 10 breaths per minute, B. Oxygen saturation LESS THAN 90%, C. Sedation score of 6  ondansetron Injectable 4 milliGRAM(s) IV Push every 6 hours PRN Nausea      FAMILY HISTORY:  Family history of premature coronary artery disease (Father): Father had 3 vessel CABG at age 39 and  at 61        SOCIAL HISTORY:       REVIEW OF SYSTEMS:  CONSTITUTIONAL: No weakness, fevers or chills  EYES/ENT: No visual changes;  No vertigo or throat pain   NECK: No pain or stiffness  RESPIRATORY: No cough, wheezing, hemoptysis; No shortness of breath  CARDIOVASCULAR: +chest pain   GASTROINTESTINAL: Post op pain  GENITOURINARY: No dysuria, frequency or hematuria  NEUROLOGICAL: No numbness or weakness  SKIN: No itching, burning, rashes, or lesions   All other review of systems is negative unless indicated above.    Vital Signs Last 24 Hrs  T(C): 36.2 (06 Mar 2019 13:23), Max: 36.6 (06 Mar 2019 09:27)  T(F): 97.1 (06 Mar 2019 13:23), Max: 97.8 (06 Mar 2019 09:27)  HR: 71 (06 Mar 2019 15:45) (71 - 89)  BP: 146/83 (06 Mar 2019 15:45) (130/81 - 159/96)  BP(mean): --  RR: 14 (06 Mar 2019 15:45) (14 - 14)  SpO2: 98% (06 Mar 2019 15:45) (94% - 98%)    I&O's Summary    06 Mar 2019 07:01  -  06 Mar 2019 16:40  --------------------------------------------------------  IN: 850 mL / OUT: 0 mL / NET: 850 mL        PHYSICAL EXAM:    General Appearance:    Somnolent on PCA, AAO X 3, in no distress.   HEENT:                       Atraumatic, PERRLA, EOMI, conjunctiva clear.   Neck:                          Supple, no adenopathy, no thyromegaly, no JVD, no carotid bruit  Chest:                     Heart:                          S1, S2 normal,   Abdomen:                      Extremities:                 no cyanosis, no edema, no joint swelling. Peripheral pulses normal  Skin:                           Skin color, texture normal. No rashes   Neurologic:                  Grossly cranial nerves intact, sensory and motor normal.      TELEMETRY: NSR-ST with PVCs    ECG: NSR with frequent PVCs    LABS:                          15.3   15.67 )-----------( 294      ( 06 Mar 2019 15:46 )             43.7     03-06    141  |  105  |  22  ----------------------------<  157<H>  4.0   |  25  |  1.85<H>    Ca    8.9      06 Mar 2019 15:46          03-06 @ 15:46  Trop-I  <0.015  CK      140  CK-MB   -- Patient is a 53y old  Male with post op CP    HPI: 53 year old male with PMHx ROBINA,DM2, CAD, STEMI S/P PCI of RCA in  , obesity presented today for laparoscopic gastric sleeve. C/O non radiating post op CP not associated with SOB, N/V. Wife at bedside      PAST MEDICAL & SURGICAL HISTORY:  Type 2 diabetes mellitus with diabetic polyneuropathy, without long-term current use of insulin  Idiopathic neuropathy  History of kidney stones  Obesity  Hyperlipidemia, unspecified hyperlipidemia type  Hypertension, unspecified type  Heart attack:   CAD (coronary artery disease): 1 stent to RCA  History of endoscopy  S/P drug eluting coronary stent placement  H/O shoulder surgery: Right      PREVIOUS CARDIAC WORKUP:      Echo:     ALLERGIES:    No Known Allergies       MEDICATIONS  (STANDING):  HYDROmorphone PCA (1 mG/mL) 30 milliLiter(s) PCA Continuous PCA Continuous  lactated ringers. 1000 milliLiter(s) (150 mL/Hr) IV Continuous <Continuous>  lactated ringers. 1000 milliLiter(s) (75 mL/Hr) IV Continuous <Continuous>  pantoprazole  Injectable 40 milliGRAM(s) IV Push every 24 hours    MEDICATIONS  (PRN):  HYDROmorphone PCA (1 mG/mL) Rescue Clinician Bolus 0.5 milliGRAM(s) IV Push every 15 minutes PRN for Pain Scale GREATER THAN 6  LORazepam   Injectable 0.5 milliGRAM(s) IntraMuscular every 6 hours PRN Nausea and/or Vomiting & anxiety  naloxone Injectable 0.1 milliGRAM(s) IV Push every 3 minutes PRN For ANY of the following changes in patient status:  A. RR LESS THAN 10 breaths per minute, B. Oxygen saturation LESS THAN 90%, C. Sedation score of 6  ondansetron Injectable 4 milliGRAM(s) IV Push every 6 hours PRN Nausea      FAMILY HISTORY:  Family history of premature coronary artery disease (Father): Father had 3 vessel CABG at age 39 and  at 61        SOCIAL HISTORY:       REVIEW OF SYSTEMS:  CONSTITUTIONAL: No weakness, fevers or chills  EYES/ENT: No visual changes;  No vertigo or throat pain   NECK: No pain or stiffness  RESPIRATORY: No cough, wheezing, hemoptysis; No shortness of breath  CARDIOVASCULAR: +chest pain   GASTROINTESTINAL: Post op pain  GENITOURINARY: No dysuria, frequency or hematuria  NEUROLOGICAL: No numbness or weakness  SKIN: No itching, burning, rashes, or lesions   All other review of systems is negative unless indicated above.    Vital Signs Last 24 Hrs  T(C): 36.2 (06 Mar 2019 13:23), Max: 36.6 (06 Mar 2019 09:27)  T(F): 97.1 (06 Mar 2019 13:23), Max: 97.8 (06 Mar 2019 09:27)  HR: 71 (06 Mar 2019 15:45) (71 - 89)  BP: 146/83 (06 Mar 2019 15:45) (130/81 - 159/96)  BP(mean): --  RR: 14 (06 Mar 2019 15:45) (14 - 14)  SpO2: 98% (06 Mar 2019 15:45) (94% - 98%)    I&O's Summary    06 Mar 2019 07:01  -  06 Mar 2019 16:40  --------------------------------------------------------  IN: 850 mL / OUT: 0 mL / NET: 850 mL        PHYSICAL EXAM:    General Appearance:    Somnolent on PCA, AAO X 3, in no distress.   HEENT:                       Atraumatic, PERRLA, EOMI, conjunctiva clear.   Neck:                          Supple, no adenopathy, no thyromegaly, no JVD, no carotid bruit  Chest:                     Heart:                          S1, S2 normal,   Abdomen:                      Extremities:                 no cyanosis, no edema, no joint swelling. Peripheral pulses normal  Skin:                           Skin color, texture normal. No rashes   Neurologic:                  Grossly cranial nerves intact, sensory and motor normal.      TELEMETRY: NSR-ST with PVCs    ECG: NSR with frequent PVCs    LABS:                          15.3   15.67 )-----------( 294      ( 06 Mar 2019 15:46 )             43.7     03-06    141  |  105  |  22  ----------------------------<  157<H>  4.0   |  25  |  1.85<H>    Ca    8.9      06 Mar 2019 15:46          03-06 @ 15:46  Trop-I  <0.015  CK      140  CK-MB   -- Patient is a 53y old  Male with post op CP    HPI: 53 year old male with PMHx ROBINA,DM2, CAD, STEMI S/P PCI of RCA in  , obesity presented today for laparoscopic gastric sleeve. C/O non radiating post op CP not associated with SOB, N/V. Wife at bedside      PAST MEDICAL & SURGICAL HISTORY:  Type 2 diabetes mellitus with diabetic polyneuropathy, without long-term current use of insulin  Idiopathic neuropathy  History of kidney stones  Obesity  Hyperlipidemia, unspecified hyperlipidemia type  Hypertension, unspecified type  Heart attack:   CAD (coronary artery disease): 1 stent to RCA  History of endoscopy  S/P drug eluting coronary stent placement  H/O shoulder surgery: Right      PREVIOUS CARDIAC WORKUP:      Echo:     ALLERGIES:    No Known Allergies       MEDICATIONS  (STANDING):  HYDROmorphone PCA (1 mG/mL) 30 milliLiter(s) PCA Continuous PCA Continuous  lactated ringers. 1000 milliLiter(s) (150 mL/Hr) IV Continuous <Continuous>  lactated ringers. 1000 milliLiter(s) (75 mL/Hr) IV Continuous <Continuous>  pantoprazole  Injectable 40 milliGRAM(s) IV Push every 24 hours    MEDICATIONS  (PRN):  HYDROmorphone PCA (1 mG/mL) Rescue Clinician Bolus 0.5 milliGRAM(s) IV Push every 15 minutes PRN for Pain Scale GREATER THAN 6  LORazepam   Injectable 0.5 milliGRAM(s) IntraMuscular every 6 hours PRN Nausea and/or Vomiting & anxiety  naloxone Injectable 0.1 milliGRAM(s) IV Push every 3 minutes PRN For ANY of the following changes in patient status:  A. RR LESS THAN 10 breaths per minute, B. Oxygen saturation LESS THAN 90%, C. Sedation score of 6  ondansetron Injectable 4 milliGRAM(s) IV Push every 6 hours PRN Nausea      FAMILY HISTORY:  Family history of premature coronary artery disease (Father): Father had 3 vessel CABG at age 39 and  at 61        SOCIAL HISTORY:       REVIEW OF SYSTEMS:  CONSTITUTIONAL: No weakness, fevers or chills  EYES/ENT: No visual changes;  No vertigo or throat pain   NECK: No pain or stiffness  RESPIRATORY: No cough, wheezing, hemoptysis; No shortness of breath  CARDIOVASCULAR: +chest pain   GASTROINTESTINAL: Post op pain  GENITOURINARY: No dysuria, frequency or hematuria  NEUROLOGICAL: No numbness or weakness  SKIN: No itching, burning, rashes, or lesions   All other review of systems is negative unless indicated above.    Vital Signs Last 24 Hrs  T(C): 36.2 (06 Mar 2019 13:23), Max: 36.6 (06 Mar 2019 09:27)  T(F): 97.1 (06 Mar 2019 13:23), Max: 97.8 (06 Mar 2019 09:27)  HR: 71 (06 Mar 2019 15:45) (71 - 89)  BP: 146/83 (06 Mar 2019 15:45) (130/81 - 159/96)  BP(mean): --  RR: 14 (06 Mar 2019 15:45) (14 - 14)  SpO2: 98% (06 Mar 2019 15:45) (94% - 98%)    I&O's Summary    06 Mar 2019 07:01  -  06 Mar 2019 16:40  --------------------------------------------------------  IN: 850 mL / OUT: 0 mL / NET: 850 mL        PHYSICAL EXAM:    General Appearance:    Somnolent on PCA, AAO X 3, in no distress.   HEENT:                       Atraumatic, PERRLA, EOMI, conjunctiva clear.   Neck:                          Supple, no adenopathy, no thyromegaly, no JVD, no carotid bruit  Chest:                          CTAB, normal effort  Heart:                          S1, S2 normal,   Abdomen:                    Soft, Tender. Lap surgical wounds without drainage or bleeding  Extremities:                 no cyanosis, no edema, Peripheral pulses normal  Skin:                           Skin color, texture normal. No rashes   Neurologic:                  Grossly cranial nerves intact, sensory and motor normal.      TELEMETRY: NSR-ST with PVCs    ECG: NSR with frequent PVCs    LABS:                          15.3   15.67 )-----------( 294      ( 06 Mar 2019 15:46 )             43.7     03-06    141  |  105  |  22  ----------------------------<  157<H>  4.0   |  25  |  1.85<H>    Ca    8.9      06 Mar 2019 15:46          03-06 @ 15:46  Trop-I  <0.015  CK      140  CK-MB   --

## 2019-03-06 NOTE — BRIEF OPERATIVE NOTE - PROCEDURE
<<-----Click on this checkbox to enter Procedure Endoscopy  03/06/2019    Active  ENASTRO  Laparoscopic sleeve gastrectomy  03/06/2019    Active  ENASTRO

## 2019-03-06 NOTE — PROGRESS NOTE ADULT - ASSESSMENT
HPI: 53 year old male with PMHx ROBINA,DM2, CAD S/P PCI 2014, obesity presented today for laparoscopic gastric sleeve. C/O non radiating post op CP not associated with SOB, N/V. Wife at bedside  1st Troponin negative  Plan:   Admit to tele  Lopressor 5 mg IVPx1  Trend cardiac enzymes  TTE  EKG in AM  D/W Dr Long HPI: 53 year old male with PMHx ROBINA,DM2, CAD , STEMI S/P PCI in 2014, obesity presented today for laparoscopic gastric sleeve. C/O non radiating post op CP not associated with SOB, N/V. Wife at bedside  Pain has improved but increases with deep inspiration   1st Troponin negative  Plan:   Admit to tele  Lopressor 5 mg IVPx1  Toprol 25 mg PO daily  Trend cardiac enzymes  TTE  EKG in AM  D/W Dr Long

## 2019-03-07 LAB
ANION GAP SERPL CALC-SCNC: 10 MMOL/L — SIGNIFICANT CHANGE UP (ref 5–17)
BASOPHILS # BLD AUTO: 0.01 K/UL — SIGNIFICANT CHANGE UP (ref 0–0.2)
BASOPHILS NFR BLD AUTO: 0.1 % — SIGNIFICANT CHANGE UP (ref 0–2)
BUN SERPL-MCNC: 25 MG/DL — HIGH (ref 7–23)
CALCIUM SERPL-MCNC: 9 MG/DL — SIGNIFICANT CHANGE UP (ref 8.5–10.1)
CHLORIDE SERPL-SCNC: 104 MMOL/L — SIGNIFICANT CHANGE UP (ref 96–108)
CO2 SERPL-SCNC: 26 MMOL/L — SIGNIFICANT CHANGE UP (ref 22–31)
CREAT SERPL-MCNC: 1.54 MG/DL — HIGH (ref 0.5–1.3)
EOSINOPHIL # BLD AUTO: 0 K/UL — SIGNIFICANT CHANGE UP (ref 0–0.5)
EOSINOPHIL NFR BLD AUTO: 0 % — SIGNIFICANT CHANGE UP (ref 0–6)
GLUCOSE SERPL-MCNC: 118 MG/DL — HIGH (ref 70–99)
HCT VFR BLD CALC: 41.2 % — SIGNIFICANT CHANGE UP (ref 39–50)
HGB BLD-MCNC: 14.4 G/DL — SIGNIFICANT CHANGE UP (ref 13–17)
IMM GRANULOCYTES NFR BLD AUTO: 0.2 % — SIGNIFICANT CHANGE UP (ref 0–1.5)
LYMPHOCYTES # BLD AUTO: 0.67 K/UL — LOW (ref 1–3.3)
LYMPHOCYTES # BLD AUTO: 5.4 % — LOW (ref 13–44)
MCHC RBC-ENTMCNC: 33.4 PG — SIGNIFICANT CHANGE UP (ref 27–34)
MCHC RBC-ENTMCNC: 35 GM/DL — SIGNIFICANT CHANGE UP (ref 32–36)
MCV RBC AUTO: 95.6 FL — SIGNIFICANT CHANGE UP (ref 80–100)
MONOCYTES # BLD AUTO: 0.68 K/UL — SIGNIFICANT CHANGE UP (ref 0–0.9)
MONOCYTES NFR BLD AUTO: 5.5 % — SIGNIFICANT CHANGE UP (ref 2–14)
NEUTROPHILS # BLD AUTO: 10.96 K/UL — HIGH (ref 1.8–7.4)
NEUTROPHILS NFR BLD AUTO: 88.8 % — HIGH (ref 43–77)
NRBC # BLD: 0 /100 WBCS — SIGNIFICANT CHANGE UP (ref 0–0)
PLATELET # BLD AUTO: 270 K/UL — SIGNIFICANT CHANGE UP (ref 150–400)
POTASSIUM SERPL-MCNC: 4.4 MMOL/L — SIGNIFICANT CHANGE UP (ref 3.5–5.3)
POTASSIUM SERPL-SCNC: 4.4 MMOL/L — SIGNIFICANT CHANGE UP (ref 3.5–5.3)
RBC # BLD: 4.31 M/UL — SIGNIFICANT CHANGE UP (ref 4.2–5.8)
RBC # FLD: 12.7 % — SIGNIFICANT CHANGE UP (ref 10.3–14.5)
SODIUM SERPL-SCNC: 140 MMOL/L — SIGNIFICANT CHANGE UP (ref 135–145)
TROPONIN I SERPL-MCNC: <0.015 NG/ML — SIGNIFICANT CHANGE UP (ref 0.01–0.04)
TROPONIN I SERPL-MCNC: <0.015 NG/ML — SIGNIFICANT CHANGE UP (ref 0.01–0.04)
WBC # BLD: 12.35 K/UL — HIGH (ref 3.8–10.5)
WBC # FLD AUTO: 12.35 K/UL — HIGH (ref 3.8–10.5)

## 2019-03-07 PROCEDURE — 93010 ELECTROCARDIOGRAM REPORT: CPT

## 2019-03-07 RX ORDER — HYDRALAZINE HCL 50 MG
10 TABLET ORAL ONCE
Qty: 0 | Refills: 0 | Status: COMPLETED | OUTPATIENT
Start: 2019-03-07 | End: 2019-03-07

## 2019-03-07 RX ORDER — OXYCODONE HYDROCHLORIDE 5 MG/1
10 TABLET ORAL EVERY 4 HOURS
Qty: 0 | Refills: 0 | Status: DISCONTINUED | OUTPATIENT
Start: 2019-03-07 | End: 2019-03-08

## 2019-03-07 RX ORDER — CLOPIDOGREL BISULFATE 75 MG/1
75 TABLET, FILM COATED ORAL DAILY
Qty: 0 | Refills: 0 | Status: DISCONTINUED | OUTPATIENT
Start: 2019-03-07 | End: 2019-03-08

## 2019-03-07 RX ORDER — LOSARTAN POTASSIUM 100 MG/1
100 TABLET, FILM COATED ORAL DAILY
Qty: 0 | Refills: 0 | Status: DISCONTINUED | OUTPATIENT
Start: 2019-03-07 | End: 2019-03-08

## 2019-03-07 RX ORDER — ACETAMINOPHEN 500 MG
1000 TABLET ORAL ONCE
Qty: 0 | Refills: 0 | Status: COMPLETED | OUTPATIENT
Start: 2019-03-07 | End: 2019-03-07

## 2019-03-07 RX ORDER — OXYCODONE HYDROCHLORIDE 5 MG/1
5 TABLET ORAL EVERY 4 HOURS
Qty: 0 | Refills: 0 | Status: DISCONTINUED | OUTPATIENT
Start: 2019-03-07 | End: 2019-03-08

## 2019-03-07 RX ADMIN — Medication 25 MILLIGRAM(S): at 05:38

## 2019-03-07 RX ADMIN — Medication 400 MILLIGRAM(S): at 12:02

## 2019-03-07 RX ADMIN — SODIUM CHLORIDE 150 MILLILITER(S): 9 INJECTION, SOLUTION INTRAVENOUS at 15:51

## 2019-03-07 RX ADMIN — Medication 10 MILLIGRAM(S): at 23:10

## 2019-03-07 RX ADMIN — CLOPIDOGREL BISULFATE 75 MILLIGRAM(S): 75 TABLET, FILM COATED ORAL at 11:58

## 2019-03-07 RX ADMIN — PANTOPRAZOLE SODIUM 40 MILLIGRAM(S): 20 TABLET, DELAYED RELEASE ORAL at 15:50

## 2019-03-07 RX ADMIN — ENOXAPARIN SODIUM 40 MILLIGRAM(S): 100 INJECTION SUBCUTANEOUS at 18:53

## 2019-03-07 RX ADMIN — SODIUM CHLORIDE 150 MILLILITER(S): 9 INJECTION, SOLUTION INTRAVENOUS at 08:07

## 2019-03-07 RX ADMIN — LOSARTAN POTASSIUM 100 MILLIGRAM(S): 100 TABLET, FILM COATED ORAL at 18:16

## 2019-03-07 RX ADMIN — OXYCODONE HYDROCHLORIDE 5 MILLIGRAM(S): 5 TABLET ORAL at 22:30

## 2019-03-07 RX ADMIN — ENOXAPARIN SODIUM 40 MILLIGRAM(S): 100 INJECTION SUBCUTANEOUS at 05:22

## 2019-03-07 RX ADMIN — OXYCODONE HYDROCHLORIDE 5 MILLIGRAM(S): 5 TABLET ORAL at 21:46

## 2019-03-07 RX ADMIN — SODIUM CHLORIDE 150 MILLILITER(S): 9 INJECTION, SOLUTION INTRAVENOUS at 22:24

## 2019-03-07 NOTE — PROGRESS NOTE ADULT - ASSESSMENT
HPI: 53 year old male with PMHx ROBINA,DM2, CAD, STEMI S/P PCI of RCA in  2014, obesity presented on 3/6 for laparoscopic gastric sleeve. C/O non radiating post op CP not associated with SOB, N/V.  Denies CP SOB, palpitation  at this time.  Trop negative x3  Plan:  May be transferred to med-surg unit  Resume ASA and Plavix per surgical team

## 2019-03-07 NOTE — DIETITIAN INITIAL EVALUATION ADULT. - OTHER INFO
No H&P available.  Consult for Bariatric Surgery.  No edema noted.  No Antony noted. Consult for Bariatric Surgery. Pt is 53 year old male with PMHx ROBINA,DM2, CAD, STEMI S/P PCI of RCA in  2014, obesity presented today for laparoscopic gastric sleeve. C/O non radiating post op CP not associated with SOB, N/V. Wife at bedside  No edema noted.  No Antony noted. Consult for Bariatric Surgery. Pt is 53 year old male with PMHx ROBINA,DM2, CAD, STEMI S/P PCI of RCA in  2014, obesity presented today for laparoscopic gastric sleeve. C/O non radiating post op CP not associated with SOB, N/V. Wife at bedside  No edema noted.  No Antony noted. On iterview, pt was cleared from telemetry, will be leaving floor.  Pt educated on stage 1 Bariatric diet Clear Liquid Phase.  Pt  has just been advanced.  Will monitor PO intake, tolerance, labs and weight.

## 2019-03-07 NOTE — PROGRESS NOTE ADULT - SUBJECTIVE AND OBJECTIVE BOX
Post Op Day#: 1  Procedure:  Laparoscopic Sleeve Gastrectomy    The patient is doing well without complaints.  His troponins were normal and no further chest pain.    Vital Signs Last 24 Hrs  T(C): 36.8 (07 Mar 2019 05:37), Max: 36.8 (06 Mar 2019 22:52)  T(F): 98.2 (07 Mar 2019 05:37), Max: 98.3 (06 Mar 2019 22:52)  HR: 97 (07 Mar 2019 05:37) (69 - 97)  BP: 133/79 (07 Mar 2019 05:37) (112/72 - 159/96)  BP(mean): --  RR: 17 (07 Mar 2019 05:37) (12 - 18)  SpO2: 93% (07 Mar 2019 05:37) (93% - 99%)    PHYSICAL EXAM:  General: NAD.  HEENT: no JVD, no jaundice.  LUNGS: CTAB.  Heart: S1 S2 RRR  Abd: soft nt/nd   Wounds: clean dry and intact                          14.4   12.35 )-----------( 270      ( 07 Mar 2019 05:40 )             41.2       03-07    140  |  104  |  25<H>  ----------------------------<  118<H>  4.4   |  26  |  1.54<H>    Ca    9.0      07 Mar 2019 05:40

## 2019-03-07 NOTE — PROVIDER CONTACT NOTE (OTHER) - SITUATION
as I was rounding I went to check on my pt PCA pump. pt was up, when I asked if he currently has chest pain he stated it wax and wanes and that is present but let when he initially experienced it.

## 2019-03-07 NOTE — PROVIDER CONTACT NOTE (OTHER) - BACKGROUND
pt also stated he is belching more and that helps. pt background consist of ROBINA, DM2, CAD, STEMI, obesity, neuropathy, HLD, HTN, Stents.

## 2019-03-07 NOTE — DIETITIAN INITIAL EVALUATION ADULT. - PERTINENT MEDS FT
MEDICATIONS  (STANDING):  enoxaparin Injectable 40 milliGRAM(s) SubCutaneous every 12 hours  HYDROmorphone PCA (1 mG/mL) 30 milliLiter(s) PCA Continuous PCA Continuous  lactated ringers. 1000 milliLiter(s) (150 mL/Hr) IV Continuous <Continuous>  metoprolol succinate ER 25 milliGRAM(s) Oral daily  pantoprazole  Injectable 40 milliGRAM(s) IV Push every 24 hours    MEDICATIONS  (PRN):  HYDROmorphone PCA (1 mG/mL) Rescue Clinician Bolus 0.5 milliGRAM(s) IV Push every 15 minutes PRN for Pain Scale GREATER THAN 6  LORazepam   Injectable 0.5 milliGRAM(s) IntraMuscular every 6 hours PRN Nausea and/or Vomiting & anxiety  naloxone Injectable 0.1 milliGRAM(s) IV Push every 3 minutes PRN For ANY of the following changes in patient status:  A. RR LESS THAN 10 breaths per minute, B. Oxygen saturation LESS THAN 90%, C. Sedation score of 6  ondansetron Injectable 4 milliGRAM(s) IV Push every 6 hours PRN Nausea

## 2019-03-07 NOTE — PROGRESS NOTE ADULT - ASSESSMENT
s/p lap sleeve gastrectomy    The patient is status post laparoscopic sleeve gastrectomy and doing very well.    The patient will have an upper G.I. series this morning, if it shows no leak or stricture, will start gastric bypass clears.  Ambulation.  Pain control.  Incentive spirometer.  restart plavix tonight and asa tomorrow morning

## 2019-03-07 NOTE — DIETITIAN INITIAL EVALUATION ADULT. - PERTINENT LABORATORY DATA
03-07 Na140 mmol/L Glu 118 mg/dL<H> K+ 4.4 mmol/L Cr  1.54 mg/dL<H> BUN 25 mg/dL<H> Phos n/a   Alb n/a   PAB n/a

## 2019-03-07 NOTE — PROGRESS NOTE ADULT - SUBJECTIVE AND OBJECTIVE BOX
Patient is a 53y old  Male who presents with a chief complaint of sleeve gastrectomy . Pt C/O CP post op (07 Mar 2019 08:27)      HPI: 53 year old male with PMHx ROBINA,DM2, CAD, STEMI S/P PCI of RCA in  2014, obesity presented on 3/6 for laparoscopic gastric sleeve. C/O non radiating post op CP not associated with SOB, N/V.  Denies CP SOB, palpitation  at this time    PAST MEDICAL & SURGICAL HISTORY:  Type 2 diabetes mellitus with diabetic polyneuropathy, without long-term current use of insulin  Idiopathic neuropathy  History of kidney stones  Obesity  Hyperlipidemia, unspecified hyperlipidemia type  Hypertension, unspecified type  Heart attack: 2014  CAD (coronary artery disease): 1 stent to RCA  History of endoscopy  S/P drug eluting coronary stent placement  H/O shoulder surgery: Right        ALLERGIES:    No Known Allergies       MEDICATIONS  (STANDING):  acetaminophen  IVPB .. 1000 milliGRAM(s) IV Intermittent once  clopidogrel Tablet 75 milliGRAM(s) Oral daily  enoxaparin Injectable 40 milliGRAM(s) SubCutaneous every 12 hours  lactated ringers. 1000 milliLiter(s) (150 mL/Hr) IV Continuous <Continuous>  metoprolol succinate ER 25 milliGRAM(s) Oral daily  pantoprazole  Injectable 40 milliGRAM(s) IV Push every 24 hours    MEDICATIONS  (PRN):  LORazepam   Injectable 0.5 milliGRAM(s) IntraMuscular every 6 hours PRN Nausea and/or Vomiting & anxiety  naloxone Injectable 0.1 milliGRAM(s) IV Push every 3 minutes PRN For ANY of the following changes in patient status:  A. RR LESS THAN 10 breaths per minute, B. Oxygen saturation LESS THAN 90%, C. Sedation score of 6  ondansetron Injectable 4 milliGRAM(s) IV Push every 6 hours PRN Nausea  oxyCODONE    Solution 5 milliGRAM(s) Oral every 4 hours PRN Mild Pain (1 - 3)  oxyCODONE    Solution 10 milliGRAM(s) Oral every 4 hours PRN Moderate Pain (4 - 6)          Vital Signs Last 24 Hrs  T(C): 36.8 (07 Mar 2019 05:37), Max: 36.8 (06 Mar 2019 22:52)  T(F): 98.2 (07 Mar 2019 05:37), Max: 98.3 (06 Mar 2019 22:52)  HR: 97 (07 Mar 2019 05:37) (69 - 97)  BP: 133/79 (07 Mar 2019 05:37) (112/72 - 159/96)  BP(mean): --  RR: 17 (07 Mar 2019 05:37) (12 - 18)  SpO2: 93% (07 Mar 2019 05:37) (93% - 99%)    I&O's Summary    06 Mar 2019 07:01  -  07 Mar 2019 07:00  --------------------------------------------------------  IN: 1500 mL / OUT: 0 mL / NET: 1500 mL          PHYSICAL EXAM:    General Appearance:    Comfortable, AAO X 3, in no distress.   HEENT:                       Atraumatic, PERRLA, EOMI, conjunctiva clear.   Neck:                          Supple, no adenopathy, no thyromegaly, no JVD, no carotid bruit                Chest:                         Clear to auscultation, no wheezes, rales or rhonchi, symmetric air entry, no tachypnea, retractions or cyanosis  Heart:                          S1, S2 normal, no gallop, no murmur.  Abdomen:                    non tender, non distended  Extremities:                 no cyanosis, no edema, no joint swelling. Peripheral pulses normal  Skin:                           Skin color, texture normal. No rashes   Neurologic:                  Grossly cranial nerves intact, sensory and motor normal.      TELEMETRY:    ECG: NSR with sinus arrhthmia    LABS:                          14.4   12.35 )-----------( 270      ( 07 Mar 2019 05:40 )             41.2     03-07    140  |  104  |  25<H>  ----------------------------<  118<H>  4.4   |  26  |  1.54<H>    Ca    9.0      07 Mar 2019 05:40          03-07 @ 05:40  Trop-I  <0.015  CK      --  CK-MB   --    03-06 @ 23:36  Trop-I  <0.015  CK      --  CK-MB   --    03-06 @ 15:46  Trop-I  <0.015  CK      140  CK-MB   -- Patient is a 53y old  Male who presents with a chief complaint of sleeve gastrectomy . Pt C/O CP post op (07 Mar 2019 08:27)      HPI: 53 year old male with PMHx ROBINA,DM2, CAD, STEMI S/P PCI of RCA in  2014, obesity presented on 3/6 for laparoscopic gastric sleeve. C/O non radiating post op CP not associated with SOB, N/V.  Denies CP SOB, palpitation  at this time    PAST MEDICAL & SURGICAL HISTORY:  Type 2 diabetes mellitus with diabetic polyneuropathy, without long-term current use of insulin  Idiopathic neuropathy  History of kidney stones  Obesity  Hyperlipidemia, unspecified hyperlipidemia type  Hypertension, unspecified type  Heart attack: 2014  CAD (coronary artery disease): 1 stent to RCA  History of endoscopy  S/P drug eluting coronary stent placement  H/O shoulder surgery: Right        ALLERGIES:    No Known Allergies       MEDICATIONS  (STANDING):  acetaminophen  IVPB .. 1000 milliGRAM(s) IV Intermittent once  clopidogrel Tablet 75 milliGRAM(s) Oral daily  enoxaparin Injectable 40 milliGRAM(s) SubCutaneous every 12 hours  lactated ringers. 1000 milliLiter(s) (150 mL/Hr) IV Continuous <Continuous>  metoprolol succinate ER 25 milliGRAM(s) Oral daily  pantoprazole  Injectable 40 milliGRAM(s) IV Push every 24 hours    MEDICATIONS  (PRN):  LORazepam   Injectable 0.5 milliGRAM(s) IntraMuscular every 6 hours PRN Nausea and/or Vomiting & anxiety  naloxone Injectable 0.1 milliGRAM(s) IV Push every 3 minutes PRN For ANY of the following changes in patient status:  A. RR LESS THAN 10 breaths per minute, B. Oxygen saturation LESS THAN 90%, C. Sedation score of 6  ondansetron Injectable 4 milliGRAM(s) IV Push every 6 hours PRN Nausea  oxyCODONE    Solution 5 milliGRAM(s) Oral every 4 hours PRN Mild Pain (1 - 3)  oxyCODONE    Solution 10 milliGRAM(s) Oral every 4 hours PRN Moderate Pain (4 - 6)          Vital Signs Last 24 Hrs  T(C): 36.8 (07 Mar 2019 05:37), Max: 36.8 (06 Mar 2019 22:52)  T(F): 98.2 (07 Mar 2019 05:37), Max: 98.3 (06 Mar 2019 22:52)  HR: 97 (07 Mar 2019 05:37) (69 - 97)  BP: 133/79 (07 Mar 2019 05:37) (112/72 - 159/96)  BP(mean): --  RR: 17 (07 Mar 2019 05:37) (12 - 18)  SpO2: 93% (07 Mar 2019 05:37) (93% - 99%)    I&O's Summary    06 Mar 2019 07:01  -  07 Mar 2019 07:00  --------------------------------------------------------  IN: 1500 mL / OUT: 0 mL / NET: 1500 mL          PHYSICAL EXAM:    General Appearance:    Comfortable, AAO X 3, in no distress.   HEENT:                       Atraumatic, PERRLA, EOMI, conjunctiva clear.   Neck:                          Supple, no adenopathy, no thyromegaly, no JVD, no carotid bruit                Chest:                         Clear to auscultation, no wheezes, rales or rhonchi, symmetric air entry, no tachypnea, retractions or cyanosis  Heart:                          S1, S2 normal, no gallop, no murmur.  Abdomen:                    non tender, non distended.  Extremities:                 no cyanosis, no edema, no joint swelling. Peripheral pulses normal  Skin:                           Skin color, texture normal. No rashes   Neurologic:                  Grossly cranial nerves intact, sensory and motor normal.      TELEMETRY:    ECG: NSR with sinus arrhthmia    LABS:                          14.4   12.35 )-----------( 270      ( 07 Mar 2019 05:40 )             41.2     03-07    140  |  104  |  25<H>  ----------------------------<  118<H>  4.4   |  26  |  1.54<H>    Ca    9.0      07 Mar 2019 05:40          03-07 @ 05:40  Trop-I  <0.015  CK      --  CK-MB   --    03-06 @ 23:36  Trop-I  <0.015  CK      --  CK-MB   --    03-06 @ 15:46  Trop-I  <0.015  CK      140  CK-MB   --

## 2019-03-07 NOTE — DIETITIAN INITIAL EVALUATION ADULT. - ENERGY NEEDS
Ht.   73   "        Wt.   120.2     kg               BMI        37          IBW  84     kg               Pt is at    143%  IBW

## 2019-03-07 NOTE — PROVIDER CONTACT NOTE (OTHER) - ASSESSMENT
pt states that the chest pain is not as severe as when initial onset. pt states belching is helping. /82 HR 90, temp 98.3 and RR 17.

## 2019-03-08 ENCOUNTER — TRANSCRIPTION ENCOUNTER (OUTPATIENT)
Age: 54
End: 2019-03-08

## 2019-03-08 VITALS
HEART RATE: 79 BPM | RESPIRATION RATE: 17 BRPM | DIASTOLIC BLOOD PRESSURE: 88 MMHG | SYSTOLIC BLOOD PRESSURE: 153 MMHG | OXYGEN SATURATION: 97 %

## 2019-03-08 DIAGNOSIS — E66.9 OBESITY, UNSPECIFIED: ICD-10-CM

## 2019-03-08 RX ORDER — ASPIRIN/CALCIUM CARB/MAGNESIUM 324 MG
81 TABLET ORAL DAILY
Qty: 0 | Refills: 0 | Status: DISCONTINUED | OUTPATIENT
Start: 2019-03-08 | End: 2019-03-08

## 2019-03-08 RX ORDER — OXYCODONE HYDROCHLORIDE 5 MG/1
5 TABLET ORAL
Qty: 0 | Refills: 0 | DISCHARGE
Start: 2019-03-08

## 2019-03-08 RX ORDER — METOPROLOL TARTRATE 50 MG
1 TABLET ORAL
Qty: 30 | Refills: 0
Start: 2019-03-08 | End: 2019-04-06

## 2019-03-08 RX ORDER — ATORVASTATIN CALCIUM 80 MG/1
1 TABLET, FILM COATED ORAL
Qty: 0 | Refills: 0 | COMMUNITY

## 2019-03-08 RX ORDER — CLOPIDOGREL BISULFATE 75 MG/1
1 TABLET, FILM COATED ORAL
Qty: 0 | Refills: 0 | COMMUNITY

## 2019-03-08 RX ORDER — OMEGA-3 ACID ETHYL ESTERS 1 G
1 CAPSULE ORAL
Qty: 0 | Refills: 0 | COMMUNITY

## 2019-03-08 RX ORDER — OXYCODONE HYDROCHLORIDE 5 MG/1
10 TABLET ORAL
Qty: 0 | Refills: 0 | DISCHARGE
Start: 2019-03-08

## 2019-03-08 RX ORDER — LOSARTAN/HYDROCHLOROTHIAZIDE 100MG-25MG
1 TABLET ORAL
Qty: 0 | Refills: 0 | COMMUNITY

## 2019-03-08 RX ORDER — METOPROLOL TARTRATE 50 MG
1 TABLET ORAL
Qty: 30 | Refills: 0 | OUTPATIENT
Start: 2019-03-08 | End: 2019-04-06

## 2019-03-08 RX ORDER — ASPIRIN/CALCIUM CARB/MAGNESIUM 324 MG
1 TABLET ORAL
Qty: 0 | Refills: 0 | COMMUNITY

## 2019-03-08 RX ADMIN — LOSARTAN POTASSIUM 100 MILLIGRAM(S): 100 TABLET, FILM COATED ORAL at 06:00

## 2019-03-08 RX ADMIN — Medication 25 MILLIGRAM(S): at 06:01

## 2019-03-08 RX ADMIN — SODIUM CHLORIDE 150 MILLILITER(S): 9 INJECTION, SOLUTION INTRAVENOUS at 05:59

## 2019-03-08 RX ADMIN — ENOXAPARIN SODIUM 40 MILLIGRAM(S): 100 INJECTION SUBCUTANEOUS at 06:00

## 2019-03-08 NOTE — DISCHARGE NOTE ADULT - HOSPITAL COURSE
Patient is an 54 yo M with PMH significant for DM 2, ROBINA, CAD with STEMI in 2014 that s/p PCI and RCA stent who underwent laparoscopic vertical sleeve gastrectomy without any complications. In PACU patient experienced chest pain and work-up was negative for MI, and cardiology consultation was called. Patient is currently doing very well with resolution of his chest pain. Cardiology recommended Tropol XL in addition to his current home medications. Patient will also resume his statin along with aspirin & Plavix. Patient has remained hemodynamically stable throughout his hospital course, pain is controlled with oral medication, and he is tolerating phase I bariatric diet. Patient is stable for discharge home with follow up in 2 weeks.

## 2019-03-08 NOTE — CDI QUERY NOTE - NSCDIOTHERTXTBX_GEN_ALL_CORE_HH
53 year old male noted with morbid obesity s/p vsg.    Creatinine Trend:  Creatinine, Serum: 1.54 mg/dL <H> [0.50 - 1.30] (03-07-19)  Creatinine, Serum: 1.85 mg/dL <H> [0.50 - 1.30] (03-06-19)  Creatinine, Serum: 1.45 mg/dL <H> [0.50 - 1.30] (02-26-19)    [facility] policy based on KDIGO guidelines defines FERCHO (applicable to both adult and pediatric patients) as any of the following;  •	Increase Creatinine = 0.3 mg/dl from baseline within 48 hours; or  •	Increase in Creatinine level to = 1.5x baseline, which is known or presumed to have occurred within the prior 7 days; or    Please clarify if the above clinical indicator can support a diagnosis?  A) FERCHO, now resolving  B) Other, please specify  C) Not clinically significant

## 2019-03-08 NOTE — DISCHARGE NOTE ADULT - CARE PLAN
Principal Discharge DX:	Obesity  Goal:	Recover from surgery  Assessment and plan of treatment:	Instructions given to patient

## 2019-03-08 NOTE — DISCHARGE NOTE ADULT - MEDICATION SUMMARY - MEDICATIONS TO TAKE
I will START or STAY ON the medications listed below when I get home from the hospital:    oxyCODONE 5 mg/5 mL oral solution  -- 5 milliliter(s) by mouth every 4 hours, As needed, Mild Pain (1 - 3)  -- Indication: For MORBID OBESITY    oxyCODONE 5 mg/5 mL oral solution  -- 10 milliliter(s) by mouth every 4 hours, As needed, Moderate Pain (4 - 6)  -- Indication: For MORBID OBESITY    Toprol-XL 25 mg oral tablet, extended release  -- 1 tab(s) by mouth once a day   -- It is very important that you take or use this exactly as directed.  Do not skip doses or discontinue unless directed by your doctor.  May cause drowsiness.  Alcohol may intensify this effect.  Use care when operating dangerous machinery.  Some non-prescription drugs may aggravate your condition.  Read all labels carefully.  If a warning appears, check with your doctor before taking.  Swallow whole.  Do not crush.  Take with food or milk.  This drug may impair the ability to drive or operate machinery.  Use care until you become familiar with its effects.    -- Indication: For Hypertension

## 2019-03-08 NOTE — CONSULT NOTE ADULT - ASSESSMENT
HPI: 53 year old male with PMHx ROBINA,DM2, CAD, STEMI S/P PCI of RCA in  2014, obesity presented on 3/6 for laparoscopic gastric sleeve.     1. CP. Resolved. Trop negative x3, no dynamic EKG changes. Cont medical mgmt for now.     2. Dyslipidemia. Cont statin.    3. CAD s/p PCI 2014. Cont asa/statin/plavix. 2D reviewed- Nl LV fxn.     4. HTN. Cont outpt regimen. Labile- but reasonably controlled. Will f/u in office early next week for BP check.     5. DVT proph. Case d/w pt and wife at bedside.

## 2019-03-08 NOTE — PROGRESS NOTE ADULT - SUBJECTIVE AND OBJECTIVE BOX
Post Op Day#: 2  Procedure:  Laparoscopic Sleeve Gastrectomy    The patient is doing well without complaints.    Vital Signs Last 24 Hrs  T(C): 36.8 (08 Mar 2019 04:01), Max: 37.1 (07 Mar 2019 09:26)  T(F): 98.3 (08 Mar 2019 04:01), Max: 98.8 (07 Mar 2019 09:26)  HR: 79 (08 Mar 2019 05:39) (75 - 96)  BP: 153/88 (08 Mar 2019 05:39) (137/77 - 161/94)  BP(mean): --  RR: 17 (08 Mar 2019 05:39) (16 - 19)  SpO2: 97% (08 Mar 2019 05:39) (95% - 98%)    PHYSICAL EXAM:  General: NAD.  HEENT: no JVD, no jaundice.  LUNGS: CTAB.  Heart: S1 S2 RRR  Abd: soft nt/nd   Wounds: clean dry and intact                          14.4   12.35 )-----------( 270      ( 07 Mar 2019 05:40 )             41.2       03-07    140  |  104  |  25<H>  ----------------------------<  118<H>  4.4   |  26  |  1.54<H>    Ca    9.0      07 Mar 2019 05:40

## 2019-03-08 NOTE — DISCHARGE NOTE ADULT - CARE PROVIDER_API CALL
Gm Echeverria)  Surgery  [address], [address]  [place] [address]  Phone: [phone]  Fax: [phone]  Follow Up Time:     Branden Long)  Cardiovascular Disease; Interventional Cardiology  [address] [place] [address]  Phone: [phone]  Fax: [phone]  Follow Up Time:

## 2019-03-08 NOTE — CONSULT NOTE ADULT - SUBJECTIVE AND OBJECTIVE BOX
Cardiology Consultation    HPI: 53 year old male with PMHx ROBINA,DM2, CAD, STEMI S/P PCI of RCA in  , obesity presented on 3/6 for laparoscopic gastric sleeve.    3/8. No CP/SOB. Chart reviewed. Case d/w wife and pt at bedside.  Feels well at present. Sitting in chair.       PAST MEDICAL & SURGICAL HISTORY:  Type 2 diabetes mellitus with diabetic polyneuropathy, without long-term current use of insulin  Idiopathic neuropathy  History of kidney stones  Obesity  Hyperlipidemia, unspecified hyperlipidemia type  Hypertension, unspecified type  Heart attack:   CAD (coronary artery disease): 1 stent to RCA  History of endoscopy  S/P drug eluting coronary stent placement  H/O shoulder surgery: Right    Allergies  No Known Allergies    SOCIAL HISTORY: Denies tobacco, etoh abuse or illicit drug use    FAMILY HISTORY: Family history of premature coronary artery disease (Father): Father had 3 vessel CABG at age 39 and  at 61    MEDICATIONS  (STANDING):  aspirin enteric coated 81 milliGRAM(s) Oral daily  clopidogrel Tablet 75 milliGRAM(s) Oral daily  enoxaparin Injectable 40 milliGRAM(s) SubCutaneous every 12 hours  lactated ringers. 1000 milliLiter(s) (150 mL/Hr) IV Continuous <Continuous>  losartan 100 milliGRAM(s) Oral daily  metoprolol succinate ER 25 milliGRAM(s) Oral daily  pantoprazole  Injectable 40 milliGRAM(s) IV Push every 24 hours    MEDICATIONS  (PRN):  LORazepam   Injectable 0.5 milliGRAM(s) IntraMuscular every 6 hours PRN Nausea and/or Vomiting & anxiety  naloxone Injectable 0.1 milliGRAM(s) IV Push every 3 minutes PRN For ANY of the following changes in patient status:  A. RR LESS THAN 10 breaths per minute, B. Oxygen saturation LESS THAN 90%, C. Sedation score of 6  ondansetron Injectable 4 milliGRAM(s) IV Push every 6 hours PRN Nausea  oxyCODONE    Solution 5 milliGRAM(s) Oral every 4 hours PRN Mild Pain (1 - 3)  oxyCODONE    Solution 10 milliGRAM(s) Oral every 4 hours PRN Moderate Pain (4 - 6)    Vital Signs Last 24 Hrs  T(C): 36.8 (08 Mar 2019 04:01), Max: 37.1 (07 Mar 2019 09:26)  T(F): 98.3 (08 Mar 2019 04:01), Max: 98.8 (07 Mar 2019 09:26)  HR: 79 (08 Mar 2019 05:39) (75 - 96)  BP: 153/88 (08 Mar 2019 05:39) (137/77 - 161/94)  BP(mean): --  RR: 17 (08 Mar 2019 05:39) (16 - 19)  SpO2: 97% (08 Mar 2019 05:39) (95% - 98%)    REVIEW OF SYSTEMS:    CONSTITUTIONAL:  As per HPI.  HEENT:  Eyes:  No diplopia or blurred vision. ENT:  No earache, sore throat or runny nose.  CARDIOVASCULAR:  No pressure, squeezing, strangling, tightness, heaviness or aching about the chest, neck, axilla or epigastrium.  RESPIRATORY:  No cough, shortness of breath, PND or orthopnea.  GASTROINTESTINAL:  No nausea, vomiting or diarrhea.  GENITOURINARY:  No dysuria, frequency or urgency.  MUSCULOSKELETAL:  As per HPI.  SKIN:  No change in skin, hair or nails.  NEUROLOGIC:  No paresthesias, fasciculations, seizures or weakness.    PHYSICAL EXAMINATION:    GENERAL APPEARANCE:  Pt. is not currently dyspneic, in no distress. Pt. is alert, oriented, and pleasant.  HEENT:  Pupils are normal and react normally. No icterus. Mucous membranes well colored.  NECK:  Supple. No lymphadenopathy. Jugular venous pressure not elevated. Carotids equal.   HEART:   The cardiac impulse has a normal quality. There are no murmurs, rubs or gallops noted  CHEST:  Chest is clear to auscultation. Normal respiratory effort.  ABDOMEN:  Soft and nontender.   EXTREMITIES:  There is no edema.     I&O's Summary    07 Mar 2019 07:01  -  08 Mar 2019 07:00  --------------------------------------------------------  IN: 0 mL / OUT: 900 mL / NET: -900 mL    LABS:                        14.4   12.35 )-----------( 270      ( 07 Mar 2019 05:40 )             41.2     03-07    140  |  104  |  25<H>  ----------------------------<  118<H>  4.4   |  26  |  1.54<H>    Ca    9.0      07 Mar 2019 05:40    CARDIAC MARKERS ( 07 Mar 2019 05:40 )  <0.015 ng/mL / x     / x     / x     / x      CARDIAC MARKERS ( 06 Mar 2019 23:36 )  <0.015 ng/mL / x     / x     / x     / x      CARDIAC MARKERS ( 06 Mar 2019 15:46 )  <0.015 ng/mL / x     / 140 U/L / x     / x        EKG: < from: 12 Lead ECG (19 @ 07:03) >  Sinus rhythm with marked sinus arrhythmia  Inferior infarct (cited on or before 07-MAR-2014)  Abnormal ECG    TELEMETRY: Not on tele.     CARDIAC TESTS: < from: Transthoracic Echocardiogram (19 @ 17:09) >     Mild concentric left ventricular hypertrophy is present.   Endocardium is not well visualized, however, overall left ventricular   systolic function appears normal. Technically Difficult Study.   Estimated left ventricular ejection fraction is 55 %.   The left atrium appears normal.   The right atrium is not well seen.   The right ventricle is normal in size.   Normal aortic valve structure and function.   Fibrocalcific changes noted to the mitral valve leaflets with preserved   leaflet excursion.   Mild mitral annular calcification is present.   EA reversal of the mitral inflow consistent with reduced compliance of   the   left ventricle.   No evidence of pericardial effusion.    RADIOLOGY & ADDITIONAL STUDIES: CXR- clear    ASSESSMENT & PLAN:

## 2019-03-08 NOTE — PROGRESS NOTE ADULT - PROBLEM SELECTOR PLAN 1
The patient is s/p lap sleeve gastrectomy and doing very well.  All discharge instructions were given to the patient, as well as potential signs of complications.  Restarted ASA/palvix  The patient will follow up in 2 weeks.  DC Home after cardiology recommendation's

## 2019-03-12 DIAGNOSIS — I10 ESSENTIAL (PRIMARY) HYPERTENSION: ICD-10-CM

## 2019-03-12 DIAGNOSIS — I25.10 ATHEROSCLEROTIC HEART DISEASE OF NATIVE CORONARY ARTERY WITHOUT ANGINA PECTORIS: ICD-10-CM

## 2019-03-12 DIAGNOSIS — K76.0 FATTY (CHANGE OF) LIVER, NOT ELSEWHERE CLASSIFIED: ICD-10-CM

## 2019-03-12 DIAGNOSIS — I25.2 OLD MYOCARDIAL INFARCTION: ICD-10-CM

## 2019-03-12 DIAGNOSIS — G47.33 OBSTRUCTIVE SLEEP APNEA (ADULT) (PEDIATRIC): ICD-10-CM

## 2019-03-12 DIAGNOSIS — E66.01 MORBID (SEVERE) OBESITY DUE TO EXCESS CALORIES: ICD-10-CM

## 2019-03-12 DIAGNOSIS — E11.9 TYPE 2 DIABETES MELLITUS WITHOUT COMPLICATIONS: ICD-10-CM

## 2019-03-12 DIAGNOSIS — M54.9 DORSALGIA, UNSPECIFIED: ICD-10-CM

## 2019-03-13 LAB — SURGICAL PATHOLOGY FINAL REPORT - CH: SIGNIFICANT CHANGE UP

## 2019-04-26 ENCOUNTER — APPOINTMENT (OUTPATIENT)
Dept: ORTHOPEDIC SURGERY | Facility: CLINIC | Age: 54
End: 2019-04-26
Payer: COMMERCIAL

## 2019-04-26 DIAGNOSIS — S83.412A SPRAIN OF MEDIAL COLLATERAL LIGAMENT OF LEFT KNEE, INITIAL ENCOUNTER: ICD-10-CM

## 2019-04-26 PROBLEM — E78.5 HYPERLIPIDEMIA, UNSPECIFIED: Chronic | Status: ACTIVE | Noted: 2019-02-26

## 2019-04-26 PROBLEM — I10 ESSENTIAL (PRIMARY) HYPERTENSION: Chronic | Status: ACTIVE | Noted: 2019-02-26

## 2019-04-26 PROBLEM — Z87.442 PERSONAL HISTORY OF URINARY CALCULI: Chronic | Status: ACTIVE | Noted: 2019-02-26

## 2019-04-26 PROCEDURE — 99213 OFFICE O/P EST LOW 20 MIN: CPT

## 2019-04-26 NOTE — DISCUSSION/SUMMARY
[de-identified] : Discussion had with patient will order MRI to eval for MCL tear\par Patient will follow up after MRI\par Patient aware must follow up with MD for further eval and treatment \par Patients questions were answered and patient is satisfied with today's visit\par

## 2019-04-26 NOTE — PHYSICAL EXAM
[UE/LE] : Sensory: Intact in bilateral upper & lower extremities [ALL] : dorsalis pedis, posterior tibial, femoral, popliteal, and radial 2+ and symmetric bilaterally [Normal] : Oriented to person, place, and time, insight and judgement were intact and the affect was normal [Poor Appearance] : well-appearing [Acute Distress] : not in acute distress [de-identified] : \par FROM to hip\par \par Skin Warm, Dry, no erythema, no lesions \par \par Knee \par stable - bruising to medial aspect of knee over femoral condyle \par anatomic alignment\par ROM 0-130\par laxity on valgus stress\par Effusion - Negative\par Crepitus - positive  \par Patella Grind - Negative \par Patella Apprehension - Negative \par Medial joint line tenderness - Negative\par Lateral Joint line tenderness - Negative\par Ryan Test - Negative  \par Lachman test - Negative \par Anterior Drawer Test -  Negative \par \par Distal Motor Function intact \par Sensation intact to light touch bilaterally \par Pulses 2+ bilaterally\par  [de-identified] : 3 view left knee reveals spur to patella, no acute findings

## 2019-05-01 ENCOUNTER — OTHER (OUTPATIENT)
Age: 54
End: 2019-05-01

## 2019-05-13 ENCOUNTER — OUTPATIENT (OUTPATIENT)
Dept: OUTPATIENT SERVICES | Facility: HOSPITAL | Age: 54
LOS: 1 days | End: 2019-05-13
Payer: COMMERCIAL

## 2019-05-13 ENCOUNTER — APPOINTMENT (OUTPATIENT)
Dept: MRI IMAGING | Facility: CLINIC | Age: 54
End: 2019-05-13
Payer: COMMERCIAL

## 2019-05-13 DIAGNOSIS — Z98.890 OTHER SPECIFIED POSTPROCEDURAL STATES: Chronic | ICD-10-CM

## 2019-05-13 DIAGNOSIS — Z95.5 PRESENCE OF CORONARY ANGIOPLASTY IMPLANT AND GRAFT: Chronic | ICD-10-CM

## 2019-05-13 DIAGNOSIS — Z00.8 ENCOUNTER FOR OTHER GENERAL EXAMINATION: ICD-10-CM

## 2019-05-13 PROCEDURE — 73721 MRI JNT OF LWR EXTRE W/O DYE: CPT | Mod: 26,LT

## 2019-05-13 PROCEDURE — 73721 MRI JNT OF LWR EXTRE W/O DYE: CPT

## 2019-05-14 DIAGNOSIS — M76.892 OTHER SPECIFIED ENTHESOPATHIES OF LEFT LOWER LIMB, EXCLUDING FOOT: ICD-10-CM

## 2019-06-10 PROBLEM — M76.892 TENDINITIS OF LEFT QUADRICEPS TENDON: Status: ACTIVE | Noted: 2019-06-10

## 2019-06-11 ENCOUNTER — APPOINTMENT (OUTPATIENT)
Dept: ORTHOPEDIC SURGERY | Facility: CLINIC | Age: 54
End: 2019-06-11
Payer: COMMERCIAL

## 2019-06-11 VITALS
SYSTOLIC BLOOD PRESSURE: 124 MMHG | HEIGHT: 70 IN | DIASTOLIC BLOOD PRESSURE: 74 MMHG | HEART RATE: 56 BPM | TEMPERATURE: 98.2 F | BODY MASS INDEX: 30.06 KG/M2 | WEIGHT: 210 LBS

## 2019-06-11 DIAGNOSIS — M25.562 PAIN IN LEFT KNEE: ICD-10-CM

## 2019-06-11 PROCEDURE — 99214 OFFICE O/P EST MOD 30 MIN: CPT

## 2019-06-11 PROCEDURE — 73560 X-RAY EXAM OF KNEE 1 OR 2: CPT | Mod: TC,LT

## 2019-06-13 ENCOUNTER — APPOINTMENT (OUTPATIENT)
Dept: ORTHOPEDIC SURGERY | Facility: CLINIC | Age: 54
End: 2019-06-13

## 2019-06-14 ENCOUNTER — APPOINTMENT (OUTPATIENT)
Dept: ORTHOPEDIC SURGERY | Facility: CLINIC | Age: 54
End: 2019-06-14
Payer: COMMERCIAL

## 2019-06-14 VITALS
TEMPERATURE: 98.4 F | HEIGHT: 70 IN | SYSTOLIC BLOOD PRESSURE: 134 MMHG | WEIGHT: 210 LBS | HEART RATE: 56 BPM | BODY MASS INDEX: 30.06 KG/M2 | DIASTOLIC BLOOD PRESSURE: 89 MMHG

## 2019-06-14 DIAGNOSIS — M10.9 GOUT, UNSPECIFIED: ICD-10-CM

## 2019-06-14 DIAGNOSIS — Z86.69 PERSONAL HISTORY OF OTHER DISEASES OF THE NERVOUS SYSTEM AND SENSE ORGANS: ICD-10-CM

## 2019-06-14 DIAGNOSIS — Z86.79 PERSONAL HISTORY OF OTHER DISEASES OF THE CIRCULATORY SYSTEM: ICD-10-CM

## 2019-06-14 PROCEDURE — 99213 OFFICE O/P EST LOW 20 MIN: CPT

## 2019-06-19 NOTE — DISCUSSION/SUMMARY
[de-identified] : The patient is given a prescription for an antibiotic for the left knee pain but he is advised to go to his primary care physician, Dr. Levin, to rule out gout at this time.  He will return to the office in one week.

## 2019-06-19 NOTE — HISTORY OF PRESENT ILLNESS
[de-identified] : The patient comes in today for his left knee.  He is status post a gout attack.  He was here approximately a few days ago and he was advised to be sent over to Dr. Levin.  I spoke to Delphine, who is the physician assistant at Dr. Levin.  She confirmed that it was gout and she placed him on indomethacin, as well as colchicine.  The patient comes in today just for a follow up.  The patient states he feels excellent.\par

## 2019-06-19 NOTE — ADDENDUM
[FreeTextEntry1] : This note was written by Johnathan Escalona on 06/19/2019, acting as a scribe for HEATHER NARAYAN, EVONNE/NATHAN, PA \par

## 2019-06-19 NOTE — PHYSICAL EXAM
[de-identified] : Right Knee: \par Knee: Range of Motion in Degrees	\par 	                  Claimant/Normal:\par 		\par Flexion Active            135                        135-degrees\par Flexion Passive	  135	                135-degrees	\par Extension Active	  0-5	                0-5-degrees	\par Extension Passive	  0-5	                0-5-degrees	\par \par No weakness to flexion/extension.  No evidence of instability in the AP plane or varus or valgus stress.  Negative  Lachman.  Negative pivot shift.  Negative anterior drawer test.  Negative posterior drawer test.  Negative Ryan.  Negative Apley grind.  No medial or lateral joint line tenderness.  No tenderness over the medial and lateral facet of the patella.  No patellofemoral crepitations.  No lateral tilting patella.  No patellar apprehension.  No crepitation in the medial and lateral femoral condyle.  No proximal or distal swelling, edema or tenderness.  No gross motor or sensory deficits.  No intra-articular swelling.  2+ DP and PT pulses. No varus or valgus malalignment.  Skin is intact.  No rashes, scars or lesions.\par \par Left Knee:  \par Range of motion not assessed secondary to extreme pain and swelling.  \par  [de-identified] : Ambulating with a slightly antalgic to antalgic gait.  Station:  Normal.  [de-identified] : General Appearance:  Well-developed, well-nourished male in no acute distress. \par  [de-identified] : X-ray examination, two views of the left knee, reveals no acute fractures or dislocations. \par \par Review of the MRI of the left knee reveals mild to moderate insertional tendinosis of the quad with mild partial tearing of the superficial fibers with some bone marrow edema with superiorly projecting enthesophyte.

## 2019-06-19 NOTE — PHYSICAL EXAM
[de-identified] : Gait and Station:  Ambulating with a slightly antalgic to antalgic gait.  Normal Station.  [de-identified] : Left Knee: Range of Motion in Degrees	\par 	                           Claimant:	                                     Normal:	\par Flexion Active	          135 	                                  135-degrees	\par Flexion Passive	          135	                                  135-degrees	\par Extension Active	           0-5	                                  0-5-degrees	\par Extension Passive	           0-5	                                  0-5-degrees	\par \par No weakness to flexion/extension.  No evidence of instability in the AP plane or varus or valgus stress.  Negative  Lachman.  Negative pivot shift.  Negative anterior drawer test.  Negative posterior drawer test.  Negative Ryan.  Negative Apley grind.  No medial or lateral joint line tenderness.  No tenderness over the medial and lateral facet of the patella.  No patellofemoral crepitations.  No lateral tilting patella.  No patellar apprehension.  No crepitation in the medial and lateral femoral condyle.  No gross motor or sensory deficits.  2+ DP and PT pulses. No varus or valgus malalignment.  He has no redness.  He does have minimal swelling, but he is feeling much better.  \par  [de-identified] : Appearance:  Well developed, well-nourished male in no acute distress.\par

## 2019-06-19 NOTE — DISCUSSION/SUMMARY
[de-identified] : At this time, due to left knee gout, the patient was advised to follow up with Dr. Levin.  He is to continue on all the medications and return here as needed.\par

## 2019-06-19 NOTE — HISTORY OF PRESENT ILLNESS
[3] : the relief from treatment is 3/10 [5] : the relief from medicine is 5/10 [de-identified] : The patient comes in today with complaints of pain to his left knee.  He brings with him an MRI of his left knee, as described below.  The patient states the pain is localized.  The patient describes the pain as sharp and shooting.  The patient states ice makes the pain better while walking and bending makes the pain worse.\par \par Pain level includes a current pain level of 8/10. \par \par Ailment Interference:  \par Daily Livin/10\par Normal Work:  9/10\par Sleep:  9/10\par Enjoyment of Life:  9/10\par Climbing Stairs:  10/10\par Sports:  10/10\par Extra-Curricular Activities:  10/10 [] : No [de-identified] : Brace [de-identified] : Naproxen

## 2019-06-19 NOTE — ADDENDUM
[FreeTextEntry1] : This note was written by Malini Ruby on 06/15/2019 acting as scribe for Teresa Sexton, EVONNE/NATHAN, PA\par

## 2019-08-08 NOTE — HISTORY OF PRESENT ILLNESS
Lens Treatment: [Pain Location] : pain [de-identified] : Patient is a 54 year old male who presents c/o left knee pain. patient states stepped off ladder on Wed causing knee to buckle and lock on him. patient states pain to medial and anterior knee.  patients states mild swelling.

## 2019-09-11 NOTE — DISCHARGE NOTE ADULT - PATIENT PORTAL LINK FT
You can access [email or web address], offered by [facility], by registering with the following website: [email or web address]
[email or web address]...

## 2019-10-06 ENCOUNTER — TRANSCRIPTION ENCOUNTER (OUTPATIENT)
Age: 54
End: 2019-10-06

## 2019-10-07 ENCOUNTER — TRANSCRIPTION ENCOUNTER (OUTPATIENT)
Age: 54
End: 2019-10-07

## 2019-10-10 ENCOUNTER — APPOINTMENT (OUTPATIENT)
Dept: UROLOGY | Facility: CLINIC | Age: 54
End: 2019-10-10
Payer: COMMERCIAL

## 2019-10-10 ENCOUNTER — APPOINTMENT (OUTPATIENT)
Dept: CT IMAGING | Facility: CLINIC | Age: 54
End: 2019-10-10
Payer: COMMERCIAL

## 2019-10-10 ENCOUNTER — OUTPATIENT (OUTPATIENT)
Dept: OUTPATIENT SERVICES | Facility: HOSPITAL | Age: 54
LOS: 1 days | End: 2019-10-10
Payer: COMMERCIAL

## 2019-10-10 VITALS
BODY MASS INDEX: 27.92 KG/M2 | OXYGEN SATURATION: 98 % | RESPIRATION RATE: 18 BRPM | SYSTOLIC BLOOD PRESSURE: 128 MMHG | WEIGHT: 195 LBS | HEART RATE: 74 BPM | DIASTOLIC BLOOD PRESSURE: 87 MMHG | TEMPERATURE: 98 F | HEIGHT: 70 IN

## 2019-10-10 DIAGNOSIS — Z95.5 PRESENCE OF CORONARY ANGIOPLASTY IMPLANT AND GRAFT: Chronic | ICD-10-CM

## 2019-10-10 DIAGNOSIS — Z98.890 OTHER SPECIFIED POSTPROCEDURAL STATES: Chronic | ICD-10-CM

## 2019-10-10 DIAGNOSIS — R31.0 GROSS HEMATURIA: ICD-10-CM

## 2019-10-10 DIAGNOSIS — I25.2 OLD MYOCARDIAL INFARCTION: ICD-10-CM

## 2019-10-10 LAB
BILIRUB UR QL STRIP: NORMAL
CLARITY UR: NORMAL
COLLECTION METHOD: NORMAL
GLUCOSE UR-MCNC: NORMAL
HCG UR QL: 4 EU/DL
HGB UR QL STRIP.AUTO: NORMAL
KETONES UR-MCNC: NORMAL
LEUKOCYTE ESTERASE UR QL STRIP: NORMAL
NITRITE UR QL STRIP: NORMAL
PH UR STRIP: 6.5
PROT UR STRIP-MCNC: 30
SP GR UR STRIP: 1.02

## 2019-10-10 PROCEDURE — 82565 ASSAY OF CREATININE: CPT

## 2019-10-10 PROCEDURE — 74178 CT ABD&PLV WO CNTR FLWD CNTR: CPT

## 2019-10-10 PROCEDURE — 81003 URINALYSIS AUTO W/O SCOPE: CPT | Mod: NC,QW

## 2019-10-10 PROCEDURE — 99204 OFFICE O/P NEW MOD 45 MIN: CPT | Mod: 25

## 2019-10-10 PROCEDURE — 74178 CT ABD&PLV WO CNTR FLWD CNTR: CPT | Mod: 26

## 2019-10-11 ENCOUNTER — TRANSCRIPTION ENCOUNTER (OUTPATIENT)
Age: 54
End: 2019-10-11

## 2019-10-11 PROBLEM — I25.2 HISTORY OF MYOCARDIAL INFARCTION: Status: RESOLVED | Noted: 2019-10-10 | Resolved: 2019-10-11

## 2019-10-11 LAB
APPEARANCE: CLEAR
BACTERIA: NEGATIVE
BILIRUBIN URINE: NEGATIVE
BLOOD URINE: ABNORMAL
COLOR: YELLOW
GLUCOSE QUALITATIVE U: NEGATIVE
HYALINE CASTS: 0 /LPF
KETONES URINE: NEGATIVE
LEUKOCYTE ESTERASE URINE: NEGATIVE
MICROSCOPIC-UA: NORMAL
NITRITE URINE: NEGATIVE
PH URINE: 6.5
PROTEIN URINE: ABNORMAL
RED BLOOD CELLS URINE: 274 /HPF
SPECIFIC GRAVITY URINE: 1.02
SQUAMOUS EPITHELIAL CELLS: 1 /HPF
UROBILINOGEN URINE: NORMAL
WHITE BLOOD CELLS URINE: 2 /HPF

## 2019-10-11 NOTE — PHYSICAL EXAM
[General Appearance - Well Developed] : well developed [Normal Appearance] : normal appearance [General Appearance - Well Nourished] : well nourished [Well Groomed] : well groomed [General Appearance - In No Acute Distress] : no acute distress [Edema] : no peripheral edema [No Focal Deficits] : no focal deficits [Normal Station and Gait] : the gait and station were normal for the patient's age [Affect] : the affect was normal [Oriented To Time, Place, And Person] : oriented to person, place, and time [Mood] : the mood was normal [Not Anxious] : not anxious [Abdomen Soft] : soft [Exaggerated Use Of Accessory Muscles For Inspiration] : no accessory muscle use [Respiration, Rhythm And Depth] : normal respiratory rhythm and effort [Costovertebral Angle Tenderness] : no ~M costovertebral angle tenderness [Abdomen Tenderness] : non-tender [Urinary Bladder Findings] : the bladder was normal on palpation [] : no rash [No Palpable Adenopathy] : no palpable adenopathy

## 2019-10-11 NOTE — REVIEW OF SYSTEMS
[Feeling Tired] : feeling tired [see HPI] : see HPI [Painful Foreman] : painful Foreman [Poor quality erections] : Poor quality erections [No erections] : no erections [Blood in urine that you can see] : blood visible in urine [History of kidney stones] : history of kidney stones [Told you have blood in urine on a urine test] : told blood was present in a urine test [Negative] : Heme/Lymph [Fever] : no fever [Chills] : no chills [Dysuria] : no dysuria [Hesitancy] : no urinary hesitancy

## 2019-10-11 NOTE — ASSESSMENT
[FreeTextEntry1] : 54 year old man with microscopic hematuria. We discussed that the differential diagnosis includes both benign and malignant conditions including renal stones, BPH, urinary tract infections, and cancer of the bladder or ureter or kidney. Cystoscopy was recommended to rule out pathology in the bladder. CT Urogram was recommended to evaluate for presence of nephroureteral stones or malignancies. Given history of stones, pt opts for CTU and then will consent to cystoscopy if CTU is negative. Urinalysis and urine culture were recommended to check for urinary tract infection. Pt agrees and understands.

## 2019-10-11 NOTE — HISTORY OF PRESENT ILLNESS
[FreeTextEntry1] : 54M hx of multiple kidney stones seen 10/10/19 with c/o gross hematuria. This began last week. He had similar browish color urine 1 month ago but resolved spontaneously after hydration. He denies the sensation of passing a stone in that period. Pt had a UCx 3 days ago was negative. Symtoms are mild as they have spontaneously resolved. Nothing makes them better, nothing makes them worse. They are associated with nothing. \par Denies hx of smoking or personal cancer hx. \par Pt currently denies any more hematuria, dysuria, frequency or urgency.

## 2019-10-14 ENCOUNTER — APPOINTMENT (OUTPATIENT)
Dept: UROLOGY | Facility: CLINIC | Age: 54
End: 2019-10-14
Payer: COMMERCIAL

## 2019-10-14 VITALS
OXYGEN SATURATION: 99 % | DIASTOLIC BLOOD PRESSURE: 70 MMHG | SYSTOLIC BLOOD PRESSURE: 110 MMHG | HEART RATE: 62 BPM | RESPIRATION RATE: 16 BRPM | WEIGHT: 195 LBS | BODY MASS INDEX: 27.92 KG/M2 | HEIGHT: 70 IN

## 2019-10-14 LAB — BACTERIA UR CULT: NORMAL

## 2019-10-14 PROCEDURE — 52000 CYSTOURETHROSCOPY: CPT

## 2019-10-16 LAB — URINE CYTOLOGY: NORMAL

## 2019-11-06 ENCOUNTER — FORM ENCOUNTER (OUTPATIENT)
Age: 54
End: 2019-11-06

## 2019-11-07 ENCOUNTER — OUTPATIENT (OUTPATIENT)
Dept: OUTPATIENT SERVICES | Facility: HOSPITAL | Age: 54
LOS: 1 days | End: 2019-11-07
Payer: COMMERCIAL

## 2019-11-07 VITALS
TEMPERATURE: 98 F | HEIGHT: 70 IN | SYSTOLIC BLOOD PRESSURE: 154 MMHG | HEART RATE: 60 BPM | OXYGEN SATURATION: 100 % | RESPIRATION RATE: 18 BRPM | WEIGHT: 195.33 LBS | DIASTOLIC BLOOD PRESSURE: 95 MMHG

## 2019-11-07 DIAGNOSIS — Z98.84 BARIATRIC SURGERY STATUS: Chronic | ICD-10-CM

## 2019-11-07 DIAGNOSIS — N20.0 CALCULUS OF KIDNEY: ICD-10-CM

## 2019-11-07 DIAGNOSIS — Z98.890 OTHER SPECIFIED POSTPROCEDURAL STATES: Chronic | ICD-10-CM

## 2019-11-07 DIAGNOSIS — Z01.818 ENCOUNTER FOR OTHER PREPROCEDURAL EXAMINATION: ICD-10-CM

## 2019-11-07 DIAGNOSIS — Z95.5 PRESENCE OF CORONARY ANGIOPLASTY IMPLANT AND GRAFT: Chronic | ICD-10-CM

## 2019-11-07 LAB
ANION GAP SERPL CALC-SCNC: 6 MMOL/L — SIGNIFICANT CHANGE UP (ref 5–17)
APPEARANCE UR: CLEAR — SIGNIFICANT CHANGE UP
APTT BLD: 32.1 SEC — SIGNIFICANT CHANGE UP (ref 27.5–36.3)
BASOPHILS # BLD AUTO: 0.02 K/UL — SIGNIFICANT CHANGE UP (ref 0–0.2)
BASOPHILS NFR BLD AUTO: 0.3 % — SIGNIFICANT CHANGE UP (ref 0–2)
BILIRUB UR-MCNC: NEGATIVE — SIGNIFICANT CHANGE UP
BUN SERPL-MCNC: 17 MG/DL — SIGNIFICANT CHANGE UP (ref 7–23)
CALCIUM SERPL-MCNC: 9.3 MG/DL — SIGNIFICANT CHANGE UP (ref 8.5–10.1)
CHLORIDE SERPL-SCNC: 107 MMOL/L — SIGNIFICANT CHANGE UP (ref 96–108)
CO2 SERPL-SCNC: 29 MMOL/L — SIGNIFICANT CHANGE UP (ref 22–31)
COLOR SPEC: YELLOW — SIGNIFICANT CHANGE UP
CREAT SERPL-MCNC: 1.01 MG/DL — SIGNIFICANT CHANGE UP (ref 0.5–1.3)
DIFF PNL FLD: ABNORMAL
EOSINOPHIL # BLD AUTO: 0.29 K/UL — SIGNIFICANT CHANGE UP (ref 0–0.5)
EOSINOPHIL NFR BLD AUTO: 3.9 % — SIGNIFICANT CHANGE UP (ref 0–6)
GLUCOSE SERPL-MCNC: 59 MG/DL — LOW (ref 70–99)
GLUCOSE UR QL: NEGATIVE MG/DL — SIGNIFICANT CHANGE UP
HCT VFR BLD CALC: 41.7 % — SIGNIFICANT CHANGE UP (ref 39–50)
HGB BLD-MCNC: 14.2 G/DL — SIGNIFICANT CHANGE UP (ref 13–17)
IMM GRANULOCYTES NFR BLD AUTO: 0.1 % — SIGNIFICANT CHANGE UP (ref 0–1.5)
INR BLD: 1.03 RATIO — SIGNIFICANT CHANGE UP (ref 0.88–1.16)
KETONES UR-MCNC: NEGATIVE — SIGNIFICANT CHANGE UP
LEUKOCYTE ESTERASE UR-ACNC: NEGATIVE — SIGNIFICANT CHANGE UP
LYMPHOCYTES # BLD AUTO: 2.09 K/UL — SIGNIFICANT CHANGE UP (ref 1–3.3)
LYMPHOCYTES # BLD AUTO: 27.8 % — SIGNIFICANT CHANGE UP (ref 13–44)
MCHC RBC-ENTMCNC: 32.4 PG — SIGNIFICANT CHANGE UP (ref 27–34)
MCHC RBC-ENTMCNC: 34.1 GM/DL — SIGNIFICANT CHANGE UP (ref 32–36)
MCV RBC AUTO: 95.2 FL — SIGNIFICANT CHANGE UP (ref 80–100)
MONOCYTES # BLD AUTO: 0.73 K/UL — SIGNIFICANT CHANGE UP (ref 0–0.9)
MONOCYTES NFR BLD AUTO: 9.7 % — SIGNIFICANT CHANGE UP (ref 2–14)
NEUTROPHILS # BLD AUTO: 4.38 K/UL — SIGNIFICANT CHANGE UP (ref 1.8–7.4)
NEUTROPHILS NFR BLD AUTO: 58.2 % — SIGNIFICANT CHANGE UP (ref 43–77)
NITRITE UR-MCNC: NEGATIVE — SIGNIFICANT CHANGE UP
PH UR: 5 — SIGNIFICANT CHANGE UP (ref 5–8)
PLATELET # BLD AUTO: 248 K/UL — SIGNIFICANT CHANGE UP (ref 150–400)
POTASSIUM SERPL-MCNC: 4 MMOL/L — SIGNIFICANT CHANGE UP (ref 3.5–5.3)
POTASSIUM SERPL-SCNC: 4 MMOL/L — SIGNIFICANT CHANGE UP (ref 3.5–5.3)
PROT UR-MCNC: 15 MG/DL
PROTHROM AB SERPL-ACNC: 11.4 SEC — SIGNIFICANT CHANGE UP (ref 10–12.9)
RBC # BLD: 4.38 M/UL — SIGNIFICANT CHANGE UP (ref 4.2–5.8)
RBC # FLD: 12.9 % — SIGNIFICANT CHANGE UP (ref 10.3–14.5)
SODIUM SERPL-SCNC: 142 MMOL/L — SIGNIFICANT CHANGE UP (ref 135–145)
SP GR SPEC: 1.02 — SIGNIFICANT CHANGE UP (ref 1.01–1.02)
UROBILINOGEN FLD QL: NEGATIVE MG/DL — SIGNIFICANT CHANGE UP
WBC # BLD: 7.52 K/UL — SIGNIFICANT CHANGE UP (ref 3.8–10.5)
WBC # FLD AUTO: 7.52 K/UL — SIGNIFICANT CHANGE UP (ref 3.8–10.5)

## 2019-11-07 PROCEDURE — G0463: CPT | Mod: 25

## 2019-11-07 PROCEDURE — 85610 PROTHROMBIN TIME: CPT

## 2019-11-07 PROCEDURE — 93010 ELECTROCARDIOGRAM REPORT: CPT

## 2019-11-07 PROCEDURE — 86901 BLOOD TYPING SEROLOGIC RH(D): CPT

## 2019-11-07 PROCEDURE — 93005 ELECTROCARDIOGRAM TRACING: CPT

## 2019-11-07 PROCEDURE — 71046 X-RAY EXAM CHEST 2 VIEWS: CPT | Mod: 26

## 2019-11-07 PROCEDURE — 86850 RBC ANTIBODY SCREEN: CPT

## 2019-11-07 PROCEDURE — 71046 X-RAY EXAM CHEST 2 VIEWS: CPT

## 2019-11-07 PROCEDURE — 80048 BASIC METABOLIC PNL TOTAL CA: CPT

## 2019-11-07 PROCEDURE — 86900 BLOOD TYPING SEROLOGIC ABO: CPT

## 2019-11-07 PROCEDURE — 87086 URINE CULTURE/COLONY COUNT: CPT

## 2019-11-07 PROCEDURE — 85730 THROMBOPLASTIN TIME PARTIAL: CPT

## 2019-11-07 PROCEDURE — 81001 URINALYSIS AUTO W/SCOPE: CPT

## 2019-11-07 PROCEDURE — 85025 COMPLETE CBC W/AUTO DIFF WBC: CPT

## 2019-11-07 PROCEDURE — 36415 COLL VENOUS BLD VENIPUNCTURE: CPT

## 2019-11-07 RX ORDER — ASPIRIN/CALCIUM CARB/MAGNESIUM 324 MG
1 TABLET ORAL
Qty: 0 | Refills: 0 | DISCHARGE

## 2019-11-07 NOTE — H&P PST ADULT - ASSESSMENT
This is a 55 y/o  male with renal calculi who is scheduled for a cystoscopy Right uteroscopy laser lithotripsy stone extraction     Patient instructed on     1. NPO post midnight of surgery  2. Aware that he needs medical (Dr. Mukund Levin) and cardiac (Dr. Warren) clearance   3. May take Losartan with a sip of water on morning of procedure   4. Pt to ask Dr. Warren if he can hold Plavix and Aspirin This is a 55 y/o  male with renal calculi who is scheduled for a cystoscopy, Right uteroscopy laser lithotripsy and stone extraction     Patient instructed on     1. NPO post midnight of surgery  2. Aware that he needs medical (Dr. Mukund Levin) and cardiac (Dr. Warren) clearance   3. May take Losartan with a sip of water on morning of procedure   4. Pt to ask Dr. Warren if he should hold Plavix and Aspirin

## 2019-11-07 NOTE — H&P PST ADULT - NSICDXFAMILYHX_GEN_ALL_CORE_FT
FAMILY HISTORY:  Father  Still living? No  Family history of premature coronary artery disease, Age at diagnosis: Age Unknown

## 2019-11-07 NOTE — H&P PST ADULT - NSICDXPASTMEDICALHX_GEN_ALL_CORE_FT
PAST MEDICAL HISTORY:  CAD (coronary artery disease) 1 stent to RCA    GERD (gastroesophageal reflux disease)     Heart attack 2014    History of kidney stones     Hyperlipidemia, unspecified hyperlipidemia type     Hypertension, unspecified type     Idiopathic neuropathy B/L feet and legs    Renal calculus

## 2019-11-07 NOTE — H&P PST ADULT - NSICDXPASTSURGICALHX_GEN_ALL_CORE_FT
PAST SURGICAL HISTORY:  H/O shoulder surgery Right    History of endoscopy     S/P drug eluting coronary stent placement 2014    S/P laparoscopic sleeve gastrectomy 3/2019

## 2019-11-07 NOTE — H&P PST ADULT - HISTORY OF PRESENT ILLNESS
This is a 55 y/o  male with a PMH of HTN, HLD, MI S/P stent 2014, morbid obesity S/P gastric sleeve in March 2019 (has since lost 100 pounds) who reports he was having hematuria. Work up revealed he had Right renal caliculi. He is now scheduled for a cystoscopy, Right uteroscopy laser lithotripsy stone extraction. He denies any recent fever, hematuria, chills, SOB or chest pain . This is a 55 y/o  male with a PMH of HTN, HLD, MI S/P stent 2014, morbid obesity S/P gastric sleeve in March 2019 (has since lost 100 pounds) who reports he was having hematuria. Work up revealed he has a Right renal caliculi. He is now scheduled for a cystoscopy, Right uteroscopy laser lithotripsy stone extraction. He denies any recent fever, hematuria, chills, SOB or chest pain.

## 2019-11-08 DIAGNOSIS — N20.0 CALCULUS OF KIDNEY: ICD-10-CM

## 2019-11-08 DIAGNOSIS — Z01.818 ENCOUNTER FOR OTHER PREPROCEDURAL EXAMINATION: ICD-10-CM

## 2019-11-08 LAB
CULTURE RESULTS: NO GROWTH — SIGNIFICANT CHANGE UP
SPECIMEN SOURCE: SIGNIFICANT CHANGE UP

## 2019-11-14 ENCOUNTER — FORM ENCOUNTER (OUTPATIENT)
Age: 54
End: 2019-11-14

## 2019-11-14 RX ORDER — FENTANYL CITRATE 50 UG/ML
50 INJECTION INTRAVENOUS EVERY 6 HOURS
Refills: 0 | Status: DISCONTINUED | OUTPATIENT
Start: 2019-11-15 | End: 2019-11-15

## 2019-11-14 RX ORDER — ONDANSETRON 8 MG/1
4 TABLET, FILM COATED ORAL EVERY 6 HOURS
Refills: 0 | Status: DISCONTINUED | OUTPATIENT
Start: 2019-11-15 | End: 2019-11-15

## 2019-11-14 RX ORDER — SODIUM CHLORIDE 9 MG/ML
3 INJECTION INTRAMUSCULAR; INTRAVENOUS; SUBCUTANEOUS EVERY 8 HOURS
Refills: 0 | Status: DISCONTINUED | OUTPATIENT
Start: 2019-11-15 | End: 2019-11-15

## 2019-11-14 RX ORDER — SODIUM CHLORIDE 9 MG/ML
1000 INJECTION, SOLUTION INTRAVENOUS
Refills: 0 | Status: DISCONTINUED | OUTPATIENT
Start: 2019-11-15 | End: 2019-11-15

## 2019-11-14 RX ORDER — OXYCODONE HYDROCHLORIDE 5 MG/1
5 TABLET ORAL ONCE
Refills: 0 | Status: DISCONTINUED | OUTPATIENT
Start: 2019-11-15 | End: 2019-11-15

## 2019-11-15 ENCOUNTER — OUTPATIENT (OUTPATIENT)
Dept: INPATIENT UNIT | Facility: HOSPITAL | Age: 54
LOS: 1 days | Discharge: HOME | End: 2019-11-15
Payer: COMMERCIAL

## 2019-11-15 ENCOUNTER — APPOINTMENT (OUTPATIENT)
Dept: UROLOGY | Facility: HOSPITAL | Age: 54
End: 2019-11-15

## 2019-11-15 ENCOUNTER — OTHER (OUTPATIENT)
Age: 54
End: 2019-11-15

## 2019-11-15 VITALS
OXYGEN SATURATION: 100 % | RESPIRATION RATE: 16 BRPM | HEART RATE: 57 BPM | DIASTOLIC BLOOD PRESSURE: 89 MMHG | WEIGHT: 195.33 LBS | SYSTOLIC BLOOD PRESSURE: 135 MMHG | HEIGHT: 70 IN | TEMPERATURE: 98 F

## 2019-11-15 VITALS
TEMPERATURE: 98 F | RESPIRATION RATE: 16 BRPM | HEART RATE: 58 BPM | OXYGEN SATURATION: 100 % | DIASTOLIC BLOOD PRESSURE: 69 MMHG | SYSTOLIC BLOOD PRESSURE: 144 MMHG

## 2019-11-15 DIAGNOSIS — N20.0 CALCULUS OF KIDNEY: ICD-10-CM

## 2019-11-15 DIAGNOSIS — Z95.5 PRESENCE OF CORONARY ANGIOPLASTY IMPLANT AND GRAFT: Chronic | ICD-10-CM

## 2019-11-15 DIAGNOSIS — Z98.890 OTHER SPECIFIED POSTPROCEDURAL STATES: Chronic | ICD-10-CM

## 2019-11-15 DIAGNOSIS — Z98.84 BARIATRIC SURGERY STATUS: Chronic | ICD-10-CM

## 2019-11-15 PROCEDURE — C1889: CPT

## 2019-11-15 PROCEDURE — 76000 FLUOROSCOPY <1 HR PHYS/QHP: CPT

## 2019-11-15 PROCEDURE — 52353 CYSTOURETERO W/LITHOTRIPSY: CPT | Mod: RT

## 2019-11-15 RX ORDER — PHENAZOPYRIDINE HCL 100 MG
200 TABLET ORAL ONCE
Refills: 0 | Status: COMPLETED | OUTPATIENT
Start: 2019-11-15 | End: 2019-11-15

## 2019-11-15 RX ORDER — HYDRALAZINE HCL 50 MG
10 TABLET ORAL ONCE
Refills: 0 | Status: COMPLETED | OUTPATIENT
Start: 2019-11-15 | End: 2019-11-15

## 2019-11-15 RX ORDER — PHENAZOPYRIDINE HCL 100 MG
1 TABLET ORAL
Qty: 9 | Refills: 0
Start: 2019-11-15 | End: 2019-11-17

## 2019-11-15 RX ORDER — OXYBUTYNIN CHLORIDE 5 MG
1 TABLET ORAL
Qty: 15 | Refills: 0
Start: 2019-11-15 | End: 2019-11-19

## 2019-11-15 RX ORDER — ACETAMINOPHEN 500 MG
975 TABLET ORAL ONCE
Refills: 0 | Status: COMPLETED | OUTPATIENT
Start: 2019-11-15 | End: 2019-11-15

## 2019-11-15 RX ORDER — OXYBUTYNIN CHLORIDE 5 MG
5 TABLET ORAL ONCE
Refills: 0 | Status: COMPLETED | OUTPATIENT
Start: 2019-11-15 | End: 2019-11-15

## 2019-11-15 RX ORDER — TAMSULOSIN HYDROCHLORIDE 0.4 MG/1
1 CAPSULE ORAL
Qty: 10 | Refills: 0
Start: 2019-11-15 | End: 2019-11-24

## 2019-11-15 RX ORDER — IBUPROFEN 200 MG
1 TABLET ORAL
Qty: 20 | Refills: 0
Start: 2019-11-15 | End: 2019-11-19

## 2019-11-15 RX ORDER — FAMOTIDINE 10 MG/ML
20 INJECTION INTRAVENOUS ONCE
Refills: 0 | Status: COMPLETED | OUTPATIENT
Start: 2019-11-15 | End: 2019-11-15

## 2019-11-15 RX ORDER — LABETALOL HCL 100 MG
10 TABLET ORAL
Refills: 0 | Status: DISCONTINUED | OUTPATIENT
Start: 2019-11-15 | End: 2019-11-15

## 2019-11-15 RX ORDER — CIPROFLOXACIN LACTATE 400MG/40ML
1 VIAL (ML) INTRAVENOUS
Qty: 10 | Refills: 0
Start: 2019-11-15 | End: 2019-11-19

## 2019-11-15 RX ADMIN — Medication 5 MILLIGRAM(S): at 10:45

## 2019-11-15 RX ADMIN — Medication 10 MILLIGRAM(S): at 11:05

## 2019-11-15 RX ADMIN — FAMOTIDINE 20 MILLIGRAM(S): 10 INJECTION INTRAVENOUS at 08:09

## 2019-11-15 RX ADMIN — Medication 975 MILLIGRAM(S): at 08:09

## 2019-11-15 RX ADMIN — Medication 975 MILLIGRAM(S): at 08:11

## 2019-11-15 RX ADMIN — Medication 200 MILLIGRAM(S): at 10:44

## 2019-11-15 RX ADMIN — Medication 10 MILLIGRAM(S): at 11:20

## 2019-11-15 NOTE — BRIEF OPERATIVE NOTE - OPERATION/FINDINGS
two nonobstructing stones in RIGHT lower pole, fragmented with laser. ureter not traumatized at end of case

## 2019-11-15 NOTE — ASU DISCHARGE PLAN (ADULT/PEDIATRIC) - CARE PROVIDER_API CALL
Reji Whitaker)  Urology  [address] [facility], [address] [place] [address]  Phone: [phone]  Fax: [phone]  Established Patient  Follow Up Time: 1 week

## 2019-11-15 NOTE — ASU PATIENT PROFILE, ADULT - PSH
H/O shoulder surgery  Right  History of endoscopy    S/P drug eluting coronary stent placement  2014  S/P laparoscopic sleeve gastrectomy  3/2019

## 2019-11-15 NOTE — ASU PATIENT PROFILE, ADULT - PMH
CAD (coronary artery disease)  1 stent to RCA  GERD (gastroesophageal reflux disease)    Heart attack  2014  History of kidney stones    Hyperlipidemia, unspecified hyperlipidemia type    Hypertension, unspecified type    Idiopathic neuropathy  B/L feet and legs  Renal calculus

## 2019-11-19 PROBLEM — N20.0 CALCULUS OF KIDNEY: Chronic | Status: ACTIVE | Noted: 2019-11-07

## 2019-11-19 PROBLEM — G60.9 HEREDITARY AND IDIOPATHIC NEUROPATHY, UNSPECIFIED: Chronic | Status: ACTIVE | Noted: 2019-02-26

## 2019-11-19 PROBLEM — K21.9 GASTRO-ESOPHAGEAL REFLUX DISEASE WITHOUT ESOPHAGITIS: Chronic | Status: ACTIVE | Noted: 2019-11-07

## 2019-11-20 DIAGNOSIS — I25.2 OLD MYOCARDIAL INFARCTION: ICD-10-CM

## 2019-11-20 DIAGNOSIS — M10.9 GOUT, UNSPECIFIED: ICD-10-CM

## 2019-11-20 DIAGNOSIS — G47.30 SLEEP APNEA, UNSPECIFIED: ICD-10-CM

## 2019-11-20 DIAGNOSIS — Z79.82 LONG TERM (CURRENT) USE OF ASPIRIN: ICD-10-CM

## 2019-11-20 DIAGNOSIS — E11.42 TYPE 2 DIABETES MELLITUS WITH DIABETIC POLYNEUROPATHY: ICD-10-CM

## 2019-11-20 DIAGNOSIS — Z95.5 PRESENCE OF CORONARY ANGIOPLASTY IMPLANT AND GRAFT: ICD-10-CM

## 2019-11-20 DIAGNOSIS — I10 ESSENTIAL (PRIMARY) HYPERTENSION: ICD-10-CM

## 2019-11-20 DIAGNOSIS — N20.0 CALCULUS OF KIDNEY: ICD-10-CM

## 2019-11-20 DIAGNOSIS — Z98.84 BARIATRIC SURGERY STATUS: ICD-10-CM

## 2019-11-20 DIAGNOSIS — K21.9 GASTRO-ESOPHAGEAL REFLUX DISEASE WITHOUT ESOPHAGITIS: ICD-10-CM

## 2019-11-20 DIAGNOSIS — I25.10 ATHEROSCLEROTIC HEART DISEASE OF NATIVE CORONARY ARTERY WITHOUT ANGINA PECTORIS: ICD-10-CM

## 2019-11-20 DIAGNOSIS — E78.5 HYPERLIPIDEMIA, UNSPECIFIED: ICD-10-CM

## 2019-11-20 DIAGNOSIS — Z85.72 PERSONAL HISTORY OF NON-HODGKIN LYMPHOMAS: ICD-10-CM

## 2019-11-20 DIAGNOSIS — Z79.02 LONG TERM (CURRENT) USE OF ANTITHROMBOTICS/ANTIPLATELETS: ICD-10-CM

## 2019-11-21 ENCOUNTER — APPOINTMENT (OUTPATIENT)
Dept: UROLOGY | Facility: CLINIC | Age: 54
End: 2019-11-21

## 2019-11-26 ENCOUNTER — APPOINTMENT (OUTPATIENT)
Dept: UROLOGY | Facility: CLINIC | Age: 54
End: 2019-11-26
Payer: COMMERCIAL

## 2019-11-26 PROCEDURE — 99213 OFFICE O/P EST LOW 20 MIN: CPT

## 2019-11-26 NOTE — PHYSICAL EXAM
[General Appearance - Well Developed] : well developed [General Appearance - Well Nourished] : well nourished [Normal Appearance] : normal appearance [General Appearance - In No Acute Distress] : no acute distress [Well Groomed] : well groomed [Abdomen Soft] : soft [Abdomen Tenderness] : non-tender [Costovertebral Angle Tenderness] : no ~M costovertebral angle tenderness [Urinary Bladder Findings] : the bladder was normal on palpation [Edema] : no peripheral edema [] : no respiratory distress [Respiration, Rhythm And Depth] : normal respiratory rhythm and effort [Oriented To Time, Place, And Person] : oriented to person, place, and time [Exaggerated Use Of Accessory Muscles For Inspiration] : no accessory muscle use [Affect] : the affect was normal [Mood] : the mood was normal [Not Anxious] : not anxious [Normal Station and Gait] : the gait and station were normal for the patient's age [No Focal Deficits] : no focal deficits [No Palpable Adenopathy] : no palpable adenopathy

## 2019-11-26 NOTE — REVIEW OF SYSTEMS
[see HPI] : see HPI [Negative] : Heme/Lymph [Dysuria] : no dysuria [Hesitancy] : no urinary hesitancy [Incontinence] : no incontinence [Nocturia] : no nocturia [Genital Lesion] : no genital lesions [Testicular Pain] : no testicular pain

## 2019-11-26 NOTE — ASSESSMENT
[FreeTextEntry1] : 53 Y/o Male with hx of multiple kidney stones presents for follow up s/p cystoscopy, right URS, right lithotripsy 11/15/19. Patient with intermittent right flank pain and intermittent hematuria, currently with Plavix on hold and resolution on hematuria. Will obtain UA/ Urine Cx, cbc/bmp, KUB and RBUS. Patient to return to office in 2 weeks or sooner prn

## 2019-11-26 NOTE — HISTORY OF PRESENT ILLNESS
[FreeTextEntry1] : 54M hx of multiple kidney stones seen 10/10/19 with c/o gross hematuria. This began last week. He had similar browish color urine 1 month ago but resolved spontaneously after hydration. He denies the sensation of passing a stone in that period. Pt had a UCx 3 days ago was negative. Symtoms are mild as they have spontaneously resolved. Nothing makes them better, nothing makes them worse. They are associated with nothing. \par Denies hx of smoking or personal cancer hx. \par \par 11/26/2019: Patient presents for follow up s/p cystoscopy, right URS, right lithotripsy 11/15/19. Patient reports he had some hematuria post op which resolved and he restarted his Plavix post op day 1 and started to have gross hematuria 5 days ago for which he saw his PCP yesterday had a UA and was told to hold his Plavix and was given Rx for  Cipro 250 mg BID x 5 days. Patient states he held his Plavix and currently has no hematuria and is voiding yellow urine. Patient states his associated symptoms include intermittent right flank pain that is "pulsating" in nature. He denies any dysuria. No frequency, no urgency, no hesitancy, no straining. No incontinence. No fevers, no chills, no nausea, no vomiting.\par

## 2019-11-27 LAB
ANION GAP SERPL CALC-SCNC: 12 MMOL/L
BASOPHILS # BLD AUTO: 0.04 K/UL
BASOPHILS NFR BLD AUTO: 0.3 %
BUN SERPL-MCNC: 32 MG/DL
CALCIUM SERPL-MCNC: 9.4 MG/DL
CHLORIDE SERPL-SCNC: 103 MMOL/L
CO2 SERPL-SCNC: 28 MMOL/L
CREAT SERPL-MCNC: 1.55 MG/DL
EOSINOPHIL # BLD AUTO: 0.34 K/UL
EOSINOPHIL NFR BLD AUTO: 2.9 %
GLUCOSE SERPL-MCNC: 98 MG/DL
HCT VFR BLD CALC: 41.2 %
HGB BLD-MCNC: 13.6 G/DL
IMM GRANULOCYTES NFR BLD AUTO: 0.4 %
LYMPHOCYTES # BLD AUTO: 1.52 K/UL
LYMPHOCYTES NFR BLD AUTO: 12.9 %
MAN DIFF?: NORMAL
MCHC RBC-ENTMCNC: 32.2 PG
MCHC RBC-ENTMCNC: 33 GM/DL
MCV RBC AUTO: 97.4 FL
MONOCYTES # BLD AUTO: 1.03 K/UL
MONOCYTES NFR BLD AUTO: 8.8 %
NEUTROPHILS # BLD AUTO: 8.77 K/UL
NEUTROPHILS NFR BLD AUTO: 74.7 %
PLATELET # BLD AUTO: 346 K/UL
POTASSIUM SERPL-SCNC: 4.8 MMOL/L
RBC # BLD: 4.23 M/UL
RBC # FLD: 12.4 %
SODIUM SERPL-SCNC: 143 MMOL/L
WBC # FLD AUTO: 11.75 K/UL

## 2019-12-02 ENCOUNTER — FORM ENCOUNTER (OUTPATIENT)
Age: 54
End: 2019-12-02

## 2019-12-02 ENCOUNTER — APPOINTMENT (OUTPATIENT)
Dept: UROLOGY | Facility: CLINIC | Age: 54
End: 2019-12-02

## 2019-12-03 ENCOUNTER — APPOINTMENT (OUTPATIENT)
Dept: RADIOLOGY | Facility: CLINIC | Age: 54
End: 2019-12-03
Payer: COMMERCIAL

## 2019-12-03 ENCOUNTER — OUTPATIENT (OUTPATIENT)
Dept: OUTPATIENT SERVICES | Facility: HOSPITAL | Age: 54
LOS: 1 days | End: 2019-12-03
Payer: COMMERCIAL

## 2019-12-03 ENCOUNTER — APPOINTMENT (OUTPATIENT)
Dept: ULTRASOUND IMAGING | Facility: CLINIC | Age: 54
End: 2019-12-03
Payer: COMMERCIAL

## 2019-12-03 DIAGNOSIS — Z98.890 OTHER SPECIFIED POSTPROCEDURAL STATES: Chronic | ICD-10-CM

## 2019-12-03 DIAGNOSIS — Z00.8 ENCOUNTER FOR OTHER GENERAL EXAMINATION: ICD-10-CM

## 2019-12-03 DIAGNOSIS — Z95.5 PRESENCE OF CORONARY ANGIOPLASTY IMPLANT AND GRAFT: Chronic | ICD-10-CM

## 2019-12-03 DIAGNOSIS — Z98.84 BARIATRIC SURGERY STATUS: Chronic | ICD-10-CM

## 2019-12-03 PROCEDURE — 76775 US EXAM ABDO BACK WALL LIM: CPT | Mod: 26

## 2019-12-03 PROCEDURE — 74018 RADEX ABDOMEN 1 VIEW: CPT

## 2019-12-03 PROCEDURE — 74018 RADEX ABDOMEN 1 VIEW: CPT | Mod: 26

## 2019-12-03 PROCEDURE — 76775 US EXAM ABDO BACK WALL LIM: CPT

## 2020-01-15 ENCOUNTER — LABORATORY RESULT (OUTPATIENT)
Age: 55
End: 2020-01-15

## 2020-06-02 ENCOUNTER — APPOINTMENT (OUTPATIENT)
Dept: RHEUMATOLOGY | Facility: CLINIC | Age: 55
End: 2020-06-02
Payer: COMMERCIAL

## 2020-06-02 VITALS — WEIGHT: 195 LBS | BODY MASS INDEX: 27.92 KG/M2 | HEIGHT: 70 IN

## 2020-06-02 PROCEDURE — 99203 OFFICE O/P NEW LOW 30 MIN: CPT | Mod: 95

## 2020-06-02 RX ORDER — OMEPRAZOLE 20 MG/1
20 CAPSULE, DELAYED RELEASE ORAL
Qty: 30 | Refills: 1 | Status: DISCONTINUED | COMMUNITY
Start: 2017-04-17 | End: 2020-06-02

## 2020-06-02 RX ORDER — CEPHALEXIN 500 MG/1
500 TABLET ORAL 3 TIMES DAILY
Qty: 30 | Refills: 0 | Status: DISCONTINUED | COMMUNITY
Start: 2019-06-11 | End: 2020-06-02

## 2020-06-02 RX ORDER — PSYLLIUM HUSK 0.4 G
CAPSULE ORAL DAILY
Refills: 0 | Status: DISCONTINUED | COMMUNITY
End: 2020-06-02

## 2020-06-02 NOTE — PHYSICAL EXAM
[General Appearance - Alert] : alert [General Appearance - Well Developed] : well developed [General Appearance - Well Nourished] : well nourished [Neck Appearance] : the appearance of the neck was normal [Sclera] : the sclera and conjunctiva were normal [] : no rash [Oriented To Time, Place, And Person] : oriented to person, place, and time [Impaired Insight] : insight and judgment were intact [Affect] : the affect was normal [FreeTextEntry1] : FROM neck, shoulders hands, able to touch the floor

## 2020-06-02 NOTE — ASSESSMENT
[FreeTextEntry1] : 55-year-old  male with a history of coronary artery disease on  Plavix, GERD, hypertension, recent corona virus diagnosis, presents for further evaluation of abnormal imaging with associated back pain. He states that he has been dealing with back pain for the last month or so, he saw his chiropractor and was noted to have abnormal imaging with a suggestion of ankylosing spondylitis.\par \par His review of systems is positive for back pain which is typically worse by the end of the day. Otherwisethere is a paucity of clinical symptoms to suggest ankylosing spondylitis. Am not able to examine him fully. But he is able to touch his toes, he also has minimal distance between the wall and the tragus. He has full range of motion of his neck and shoulders and elbows.\par \par Certainly, ankylosing spondylitis is in the differential diagnosis. The other possibilities include osteoarthritis.\par \par Plan-\par -he is to have baseline labs done\par -He is to have additional imaging in the form of MRI for his thoracic and lumbar spine, we'll obtain x-rays of the SI joints as well.\par -he is on Plavix and is not a candidate for NSAIDs.\par -Recommend Tylenol use p.r.n.\par -Followup in the office in 2 weeks\par -He is aware to call if symptoms worsen

## 2020-06-02 NOTE — HISTORY OF PRESENT ILLNESS
[FreeTextEntry1] : New pt visit with the patient via tele-video using [employer], patient gives verbal consent. Patient is at remote location. Provider is at [address] [place] [address]\par 55 year old  male with a history of hypertension, coronary artery disease on Plavix, hypercholesterolemia and GERD, that is a new patient via telemedicine today. He presents for further evaluation of an abnormal x-ray. He was recently diagnosed with corona virus, this was in early April. He was not hospitalized. He had pneumonia like symptoms but recovered at home. Following this he noticed some back pain, the pain is in the middle of the back, he went to see his chiropractor and had some manipulation done. He had baseline x-rays done as per the patient and the report it suggests possibility of ankylosing spondylitis.\par he denies a family history of autoimmunity. He denies any ocular issues GI issues or  issues. He denies rashes or psoriasis. He denies chronic low back pain. He admits to mid back pain which is typically worse as the day goes on. He denies daily use of NSAID\par He has a good appetite and denies weight loss. He denies fevers or chills or night sweats. He rates his current pain at a 2-3/10.\par \par \par \par

## 2020-06-02 NOTE — CONSULT LETTER
[Dear  ___] : Dear  [unfilled], [Consult Letter:] : I had the pleasure of evaluating your patient, [unfilled]. [Please see my note below.] : Please see my note below. [Consult Closing:] : Thank you very much for allowing me to participate in the care of this patient.  If you have any questions, please do not hesitate to contact me. [Sincerely,] : Sincerely, [FreeTextEntry3] : Navi Lee D.O\par

## 2020-06-08 ENCOUNTER — OUTPATIENT (OUTPATIENT)
Dept: OUTPATIENT SERVICES | Facility: HOSPITAL | Age: 55
LOS: 1 days | End: 2020-06-08
Payer: COMMERCIAL

## 2020-06-08 ENCOUNTER — APPOINTMENT (OUTPATIENT)
Dept: RADIOLOGY | Facility: CLINIC | Age: 55
End: 2020-06-08
Payer: COMMERCIAL

## 2020-06-08 DIAGNOSIS — Z98.890 OTHER SPECIFIED POSTPROCEDURAL STATES: Chronic | ICD-10-CM

## 2020-06-08 DIAGNOSIS — Z95.5 PRESENCE OF CORONARY ANGIOPLASTY IMPLANT AND GRAFT: Chronic | ICD-10-CM

## 2020-06-08 DIAGNOSIS — M45.4 ANKYLOSING SPONDYLITIS OF THORACIC REGION: ICD-10-CM

## 2020-06-08 DIAGNOSIS — Z98.84 BARIATRIC SURGERY STATUS: Chronic | ICD-10-CM

## 2020-06-08 PROCEDURE — 71046 X-RAY EXAM CHEST 2 VIEWS: CPT

## 2020-06-08 PROCEDURE — 71046 X-RAY EXAM CHEST 2 VIEWS: CPT | Mod: 26

## 2020-06-08 PROCEDURE — 72202 X-RAY EXAM SI JOINTS 3/> VWS: CPT | Mod: 26

## 2020-06-08 PROCEDURE — 72202 X-RAY EXAM SI JOINTS 3/> VWS: CPT

## 2020-06-12 LAB
25(OH)D3 SERPL-MCNC: 31.7 NG/ML
ALBUMIN SERPL ELPH-MCNC: 4.3 G/DL
ALP BLD-CCNC: 99 U/L
ALT SERPL-CCNC: 32 U/L
ANA SER IF-ACNC: NEGATIVE
ANION GAP SERPL CALC-SCNC: 14 MMOL/L
AST SERPL-CCNC: 39 U/L
BASOPHILS # BLD AUTO: 0.03 K/UL
BASOPHILS NFR BLD AUTO: 0.5 %
BILIRUB SERPL-MCNC: 1.1 MG/DL
BUN SERPL-MCNC: 21 MG/DL
CALCIUM SERPL-MCNC: 9.7 MG/DL
CCP AB SER IA-ACNC: <8 UNITS
CHLORIDE SERPL-SCNC: 103 MMOL/L
CO2 SERPL-SCNC: 29 MMOL/L
CREAT SERPL-MCNC: 1.11 MG/DL
CRP SERPL-MCNC: <0.1 MG/DL
ENA SS-A AB SER IA-ACNC: <0.2 AL
ENA SS-B AB SER IA-ACNC: <0.2 AL
EOSINOPHIL # BLD AUTO: 0.37 K/UL
EOSINOPHIL NFR BLD AUTO: 5.6 %
ERYTHROCYTE [SEDIMENTATION RATE] IN BLOOD BY WESTERGREN METHOD: 13 MM/HR
GLUCOSE SERPL-MCNC: 82 MG/DL
HAV IGM SER QL: NONREACTIVE
HBV CORE IGM SER QL: NONREACTIVE
HBV SURFACE AG SER QL: NONREACTIVE
HCT VFR BLD CALC: 45.3 %
HCV AB SER QL: NONREACTIVE
HCV S/CO RATIO: 0.07 S/CO
HGB BLD-MCNC: 14.9 G/DL
HLA-B27 RELATED AG QL: NORMAL
IMM GRANULOCYTES NFR BLD AUTO: 0.3 %
LYMPHOCYTES # BLD AUTO: 1.5 K/UL
LYMPHOCYTES NFR BLD AUTO: 22.6 %
M TB IFN-G BLD-IMP: NEGATIVE
MAN DIFF?: NORMAL
MCHC RBC-ENTMCNC: 32.3 PG
MCHC RBC-ENTMCNC: 32.9 GM/DL
MCV RBC AUTO: 98.3 FL
MONOCYTES # BLD AUTO: 0.71 K/UL
MONOCYTES NFR BLD AUTO: 10.7 %
NEUTROPHILS # BLD AUTO: 4.01 K/UL
NEUTROPHILS NFR BLD AUTO: 60.3 %
PLATELET # BLD AUTO: 246 K/UL
POTASSIUM SERPL-SCNC: 4.7 MMOL/L
PROT SERPL-MCNC: 6.4 G/DL
QUANTIFERON TB PLUS MITOGEN MINUS NIL: 9.34 IU/ML
QUANTIFERON TB PLUS NIL: 0.05 IU/ML
QUANTIFERON TB PLUS TB1 MINUS NIL: 0.02 IU/ML
QUANTIFERON TB PLUS TB2 MINUS NIL: 0.03 IU/ML
RBC # BLD: 4.61 M/UL
RBC # FLD: 12.8 %
RF+CCP IGG SER-IMP: NEGATIVE
RHEUMATOID FACT SER QL: <10 IU/ML
SODIUM SERPL-SCNC: 145 MMOL/L
URATE SERPL-MCNC: 6.6 MG/DL
WBC # FLD AUTO: 6.64 K/UL

## 2020-06-15 ENCOUNTER — TRANSCRIPTION ENCOUNTER (OUTPATIENT)
Age: 55
End: 2020-06-15

## 2020-06-17 ENCOUNTER — APPOINTMENT (OUTPATIENT)
Dept: RHEUMATOLOGY | Facility: CLINIC | Age: 55
End: 2020-06-17
Payer: COMMERCIAL

## 2020-06-17 VITALS
OXYGEN SATURATION: 97 % | SYSTOLIC BLOOD PRESSURE: 120 MMHG | WEIGHT: 211 LBS | BODY MASS INDEX: 30.21 KG/M2 | DIASTOLIC BLOOD PRESSURE: 80 MMHG | TEMPERATURE: 97.6 F | HEIGHT: 70 IN | HEART RATE: 86 BPM

## 2020-06-17 PROCEDURE — 99214 OFFICE O/P EST MOD 30 MIN: CPT

## 2020-06-18 NOTE — HISTORY OF PRESENT ILLNESS
[FreeTextEntry1] : 55 year old  male with a history of hypertension, coronary artery disease on Plavix, hypercholesterolemia and GERD,  presents for further evaluation of an abnormal x-ray. He was recently diagnosed with corona virus, this was in early April. He was not hospitalized. He had pneumonia like symptoms but recovered at home. Following this he noticed some back pain, the pain is in the middle of the back, he went to see his chiropractor and had some manipulation done. He had baseline x-rays done as per the patient and the report it suggests possibility of ankylosing spondylitis.\par he denies a family history of autoimmunity. He denies any ocular issues GI issues or  issues. He denies rashes or psoriasis. He denies chronic low back pain. He admits to mid back pain which is typically worse as the day goes on. He denies daily use of NSAID\par He has a good appetite and denies weight loss. He denies fevers or chills or night sweats. He rates his current pain at a 2-3/10.\par Since his last visit with me he is feeling significantly better. He continues to go up to see the chiropractor and the manipulation has helped. He states that the back pain is at least 70% better. He has shoulder pain not just the process of getting it evaluated. He was told he may need an MRI.\par \par \par

## 2020-06-18 NOTE — PHYSICAL EXAM
[General Appearance - Well Nourished] : well nourished [General Appearance - Alert] : alert [Oropharynx] : the oropharynx was normal [Sclera] : the sclera and conjunctiva were normal [General Appearance - Well Developed] : well developed [Respiration, Rhythm And Depth] : normal respiratory rhythm and effort [Neck Appearance] : the appearance of the neck was normal [Auscultation Breath Sounds / Voice Sounds] : lungs were clear to auscultation bilaterally [Full Pulse] : the pedal pulses are present [Heart Sounds] : normal S1 and S2 [Edema] : there was no peripheral edema [Abdomen Tenderness] : non-tender [Supraclavicular Lymph Nodes Enlarged Bilaterally] : supraclavicular [Cervical Lymph Nodes Enlarged Anterior Bilaterally] : anterior cervical [No Spinal Tenderness] : no spinal tenderness [Nail Clubbing] : no clubbing  or cyanosis of the fingernails [Abnormal Walk] : normal gait [Musculoskeletal - Swelling] : no joint swelling seen [Motor Tone] : muscle strength and tone were normal [FreeTextEntry1] : FROM neck, shoulders, elbows, wrists, hands, hips, knees, ankles and feet, including the small joints of the hands and feet without any evidence of inflammatory arthritis fulll range of motion on SI joints [] : no rash [Oriented To Time, Place, And Person] : oriented to person, place, and time [Motor Exam] : the motor exam was normal [Impaired Insight] : insight and judgment were intact [Deep Tendon Reflexes (DTR)] : deep tendon reflexes were 2+ and symmetric [Affect] : the affect was normal

## 2020-06-18 NOTE — CONSULT LETTER
[Dear  ___] : Dear  [unfilled], [Consult Letter:] : I had the pleasure of evaluating your patient, [unfilled]. [Consult Closing:] : Thank you very much for allowing me to participate in the care of this patient.  If you have any questions, please do not hesitate to contact me. [Please see my note below.] : Please see my note below. [Sincerely,] : Sincerely, [FreeTextEntry3] : Navi Lee D.O\par

## 2020-06-18 NOTE — ASSESSMENT
[FreeTextEntry1] : 55-year-old  male with a history of coronary artery disease on  Plavix, GERD, hypertension, recent corona virus diagnosis, presents for further evaluation of abnormal imaging with associated back pain. He states that he has been dealing with back pain for the last month or so, he saw his chiropractor and was noted to have abnormal imaging with a suggestion of ankylosing spondylitis.\par \par His review of systems is positive for back pain which is typically worse by the end of the day. Otherwise there is a paucity of clinical symptoms to suggest ankylosing spondylitis. Since his last visit with me he is about 70% better basically with manipulation with the chiropractor. He states that his muscle and back pain have improved significantly but now he is dealing with some shoulder pain.\par \par Today, on my examination he has full range of motion of his thoracic and lumbar spine as well as the sacroiliac joints. There is no evidence to suggest inflammatory arthritis.\par \par At this time the suspicion for ankylosing spondylitis is low. Spinal osteoarthritis may be the most likely diagnosis\par \par Plan-\par -he is otherwise her negative, his HLA-B27 gene is negative\par -X-rays of the SI joints do not show sacroiliitis, the chest x-ray is normal as well\par -He is to get MRI thoracic and lumbar spine to further evaluate the pain in his back, as well as to rule out ankylosing spondylitis\par -he is on Plavix and is not a candidate for NSAIDs.\par -Recommend Tylenol use p.r.n.\par -Abdomen stretching and gentle exercise as tolerated\par -Followup p.r.n. pending results\par -He is aware to call if symptoms worsen

## 2020-06-30 ENCOUNTER — APPOINTMENT (OUTPATIENT)
Dept: MRI IMAGING | Facility: CLINIC | Age: 55
End: 2020-06-30
Payer: COMMERCIAL

## 2020-06-30 ENCOUNTER — APPOINTMENT (OUTPATIENT)
Dept: MRI IMAGING | Facility: CLINIC | Age: 55
End: 2020-06-30

## 2020-06-30 ENCOUNTER — OUTPATIENT (OUTPATIENT)
Dept: OUTPATIENT SERVICES | Facility: HOSPITAL | Age: 55
LOS: 1 days | End: 2020-06-30
Payer: COMMERCIAL

## 2020-06-30 DIAGNOSIS — Z98.890 OTHER SPECIFIED POSTPROCEDURAL STATES: Chronic | ICD-10-CM

## 2020-06-30 DIAGNOSIS — Z98.84 BARIATRIC SURGERY STATUS: Chronic | ICD-10-CM

## 2020-06-30 DIAGNOSIS — M45.4 ANKYLOSING SPONDYLITIS OF THORACIC REGION: ICD-10-CM

## 2020-06-30 DIAGNOSIS — Z00.8 ENCOUNTER FOR OTHER GENERAL EXAMINATION: ICD-10-CM

## 2020-06-30 DIAGNOSIS — Z95.5 PRESENCE OF CORONARY ANGIOPLASTY IMPLANT AND GRAFT: Chronic | ICD-10-CM

## 2020-06-30 PROCEDURE — 72148 MRI LUMBAR SPINE W/O DYE: CPT

## 2020-06-30 PROCEDURE — 72146 MRI CHEST SPINE W/O DYE: CPT

## 2020-06-30 PROCEDURE — 72146 MRI CHEST SPINE W/O DYE: CPT | Mod: 26

## 2020-06-30 PROCEDURE — 72148 MRI LUMBAR SPINE W/O DYE: CPT | Mod: 26

## 2020-07-01 DIAGNOSIS — M51.36 OTHER INTERVERTEBRAL DISC DEGENERATION, LUMBAR REGION: ICD-10-CM

## 2020-07-01 DIAGNOSIS — M47.814 SPONDYLOSIS W/OUT MYELOPATHY OR RADICULOPATHY, THORACIC REGION: ICD-10-CM

## 2020-10-21 ENCOUNTER — APPOINTMENT (OUTPATIENT)
Dept: NEUROLOGY | Facility: CLINIC | Age: 55
End: 2020-10-21
Payer: COMMERCIAL

## 2020-10-21 VITALS
SYSTOLIC BLOOD PRESSURE: 131 MMHG | HEIGHT: 70 IN | WEIGHT: 202 LBS | BODY MASS INDEX: 28.92 KG/M2 | HEART RATE: 54 BPM | DIASTOLIC BLOOD PRESSURE: 86 MMHG

## 2020-10-21 VITALS — TEMPERATURE: 97.6 F

## 2020-10-21 DIAGNOSIS — R51.9 HEADACHE, UNSPECIFIED: ICD-10-CM

## 2020-10-21 PROCEDURE — 99215 OFFICE O/P EST HI 40 MIN: CPT

## 2020-10-21 PROCEDURE — 99072 ADDL SUPL MATRL&STAF TM PHE: CPT

## 2020-10-22 RX ORDER — CYCLOBENZAPRINE HYDROCHLORIDE 5 MG/1
5 TABLET, FILM COATED ORAL
Qty: 5 | Refills: 0 | Status: DISCONTINUED | COMMUNITY
Start: 2020-06-15

## 2020-10-23 NOTE — ASSESSMENT
[FreeTextEntry1] : 55 year old RH male PMH, obesity s/p sleeve, hld, htn,  with complaints of headaches and brain fog post COVId  in march. exam is non focal. as he has not had a history with headaches, a brain MRI will be obtained to rule out structural changes. His underlining anxiety d/o could be exacerbating his symptoms of his cognitive complaints vs?lowell?metabolic. will obtain b12/folate level and consider sleep study\par -Brain MRI without contrast\par -BW\par f/u 2-3weeks

## 2020-10-23 NOTE — PHYSICAL EXAM
[Oriented To Time, Place, And Person] : oriented to person, place, and time [Impaired Insight] : insight and judgment were intact [Affect] : the affect was normal [Person] : oriented to person [Place] : oriented to place [Time] : oriented to time [Concentration Intact] : normal concentrating ability [Visual Intact] : visual attention was ~T not ~L decreased [Naming Objects] : no difficulty naming common objects [Repeating Phrases] : no difficulty repeating a phrase [Writing A Sentence] : no difficulty writing a sentence [Fluency] : fluency intact [Comprehension] : comprehension intact [Reading] : reading intact [Past History] : adequate knowledge of personal past history [Cranial Nerves Optic (II)] : visual acuity intact bilaterally,  visual fields full to confrontation, pupils equal round and reactive to light [Cranial Nerves Oculomotor (III)] : extraocular motion intact [Cranial Nerves Trigeminal (V)] : facial sensation intact symmetrically [Cranial Nerves Facial (VII)] : face symmetrical [Cranial Nerves Vestibulocochlear (VIII)] : hearing was intact bilaterally [Cranial Nerves Glossopharyngeal (IX)] : tongue and palate midline [Cranial Nerves Accessory (XI - Cranial And Spinal)] : head turning and shoulder shrug symmetric [Cranial Nerves Hypoglossal (XII)] : there was no tongue deviation with protrusion [Motor Tone] : muscle tone was normal in all four extremities [Motor Strength] : muscle strength was normal in all four extremities [No Muscle Atrophy] : normal bulk in all four extremities [Sensation Tactile Decrease] : light touch was intact [Abnormal Walk] : normal gait [Balance] : balance was intact [2+] : Ankle jerk left 2+ [Sclera] : the sclera and conjunctiva were normal [PERRL With Normal Accommodation] : pupils were equal in size, round, reactive to light, with normal accommodation [Past-pointing] : there was no past-pointing [Tremor] : no tremor present [Plantar Reflex Right Only] : normal on the right [Plantar Reflex Left Only] : normal on the left

## 2020-10-23 NOTE — HISTORY OF PRESENT ILLNESS
[FreeTextEntry1] : 55 Year old male who was in his usual state of health until in late March when he developed headache, back pain and fatigue. was self treating until he did a Telehealth with his  PMD in April and was put on amoxicillin for 7 days for sinus infection. There was no fever.  Two days after taking abx  he went to [place]  for work and also went to the ED and was told to be positive for COVID. XRays showed b/l lower lobes PNA. He was sent home to do breathing exercises. He quarantined until may. \par \par He reports the headaches were  right temporal radiated to left and felt like he had a vise around his head. There was no nausea, vomiting, photophobia, phonophobia,no vision changes. He was able to do his normal daily routines. Tylenol/ibuprofen was taken as needed\par \par May 19th he returned to work on site and headaches stopped.\par \par He reports for the past month, late August/September the headache came back  on the right side, throbbing, sharp and feeling of "fuzziness"  in his eye, occasionally radiated to the back of his neck. He started to see chiropractor and felt some sporadic relief of the pain.  This occurred intermitly.\par \par States he feels once the HA is over, he feels a vibration around his eye area. He reports they resolve with his daily asa, but takes long.\par He reports seeing opthalmology 2 weeks ago as he was told to have a hole in his retina which was repaired, no changes to headaches. \par \par He reports he can be more forgetful and not feeling as sharp as he was since getting over COVID. He Wife reports has also taken on more at work and deals with anxiety for which he is seeing a therapist for.\par he used to snore,but since s/p sleeve surgery he has not been. 70%of the time he does wake up with them and can last a few hours   \par  \par Sleeps: 5hrs/night\par Hydration: 4-5 16oz water/day. \par Caffeine- minimal\par Alcohol-none\par \par FH: no  hx for ha/ migraines/sz. \par \par PMH:Anxiety, HTN, \par PSH: gastric sleeve, coronary artery stent

## 2020-10-26 ENCOUNTER — TRANSCRIPTION ENCOUNTER (OUTPATIENT)
Age: 55
End: 2020-10-26

## 2020-10-28 ENCOUNTER — TRANSCRIPTION ENCOUNTER (OUTPATIENT)
Age: 55
End: 2020-10-28

## 2020-10-28 LAB
FOLATE SERPL-MCNC: 9.3 NG/ML
VIT B12 SERPL-MCNC: 434 PG/ML

## 2020-10-29 ENCOUNTER — TRANSCRIPTION ENCOUNTER (OUTPATIENT)
Age: 55
End: 2020-10-29

## 2020-11-05 ENCOUNTER — OUTPATIENT (OUTPATIENT)
Dept: OUTPATIENT SERVICES | Facility: HOSPITAL | Age: 55
LOS: 1 days | End: 2020-11-05
Payer: COMMERCIAL

## 2020-11-05 ENCOUNTER — APPOINTMENT (OUTPATIENT)
Dept: MRI IMAGING | Facility: CLINIC | Age: 55
End: 2020-11-05
Payer: COMMERCIAL

## 2020-11-05 DIAGNOSIS — Z00.8 ENCOUNTER FOR OTHER GENERAL EXAMINATION: ICD-10-CM

## 2020-11-05 DIAGNOSIS — Z95.5 PRESENCE OF CORONARY ANGIOPLASTY IMPLANT AND GRAFT: Chronic | ICD-10-CM

## 2020-11-05 DIAGNOSIS — Z98.890 OTHER SPECIFIED POSTPROCEDURAL STATES: Chronic | ICD-10-CM

## 2020-11-05 DIAGNOSIS — Z98.84 BARIATRIC SURGERY STATUS: Chronic | ICD-10-CM

## 2020-11-05 PROCEDURE — 70551 MRI BRAIN STEM W/O DYE: CPT

## 2020-11-05 PROCEDURE — 70551 MRI BRAIN STEM W/O DYE: CPT | Mod: 26

## 2020-11-10 ENCOUNTER — TRANSCRIPTION ENCOUNTER (OUTPATIENT)
Age: 55
End: 2020-11-10

## 2020-12-03 ENCOUNTER — TRANSCRIPTION ENCOUNTER (OUTPATIENT)
Age: 55
End: 2020-12-03

## 2020-12-10 ENCOUNTER — TRANSCRIPTION ENCOUNTER (OUTPATIENT)
Age: 55
End: 2020-12-10

## 2020-12-21 NOTE — ASU PREOP CHECKLIST - WEIGHT IN KG
[General Appearance - Alert] : alert [General Appearance - In No Acute Distress] : in no acute distress [General Appearance - Well Nourished] : well nourished [General Appearance - Well Developed] : well developed [Oriented To Time, Place, And Person] : oriented to person, place, and time [Affect] : the affect was normal [Person] : oriented to person [Place] : oriented to place [Time] : oriented to time [Cranial Nerves Oculomotor (III)] : extraocular motion intact [Motor Tone] : muscle tone was normal in all four extremities [Motor Strength] : muscle strength was normal in all four extremities [Sensation Tactile Decrease] : light touch was intact [Abnormal Walk] : normal gait [Balance] : balance was intact [Sclera] : the sclera and conjunctiva were normal [Outer Ear] : the ears and nose were normal in appearance [Neck Appearance] : the appearance of the neck was normal [Musculoskeletal - Swelling] : no joint swelling seen [] : no rash 88.6

## 2020-12-22 ENCOUNTER — APPOINTMENT (OUTPATIENT)
Dept: ENDOCRINOLOGY | Facility: CLINIC | Age: 55
End: 2020-12-22
Payer: COMMERCIAL

## 2020-12-22 VITALS
RESPIRATION RATE: 15 BRPM | HEART RATE: 48 BPM | WEIGHT: 212 LBS | HEIGHT: 70 IN | DIASTOLIC BLOOD PRESSURE: 80 MMHG | BODY MASS INDEX: 30.35 KG/M2 | TEMPERATURE: 98.9 F | SYSTOLIC BLOOD PRESSURE: 140 MMHG | OXYGEN SATURATION: 97 %

## 2020-12-22 PROCEDURE — 99204 OFFICE O/P NEW MOD 45 MIN: CPT

## 2020-12-22 PROCEDURE — 99072 ADDL SUPL MATRL&STAF TM PHE: CPT

## 2020-12-22 NOTE — PHYSICAL EXAM
[Alert] : alert [No Acute Distress] : no acute distress [PERRL] : pupils equal, round and reactive to light [No Neck Mass] : no neck mass was observed [No Respiratory Distress] : no respiratory distress [Regular Rhythm] : with a regular rhythm [Soft] : abdomen soft [No Stigmata of Cushings Syndrome] : no stigmata of Cushings Syndrome [Normal Gait] : normal gait [No Tremors] : no tremors [Normal Sensation on Monofilament Testing] : normal sensation on monofilament testing of lower extremities [Oriented x3] : oriented to person, place, and time [Normal Insight/Judgement] : insight and judgment were intact

## 2020-12-22 NOTE — REVIEW OF SYSTEMS
[Headaches] : headaches [Stress] : stress [Negative] : Heme/Lymph [Fatigue] : no fatigue [Decreased Appetite] : appetite not decreased [Recent Weight Gain (___ Lbs)] : no recent weight gain [Recent Weight Loss (___ Lbs)] : no recent weight loss [de-identified] : nerology feels it may be from covid

## 2020-12-22 NOTE — CONSULT LETTER
[Consult Letter:] : I had the pleasure of evaluating your patient, [unfilled]. [Consult Closing:] : Thank you very much for allowing me to participate in the care of this patient.  If you have any questions, please do not hesitate to contact me. [DrAlma  ___] : Dr. CARDENSA

## 2020-12-22 NOTE — HISTORY OF PRESENT ILLNESS
[de-identified] : 72 [de-identified] : 93 [de-identified] : 143 [de-identified] : 114 [FreeTextEntry1] : no FS less than 70

## 2020-12-22 NOTE — ASSESSMENT
[FreeTextEntry1] : Will follow headaches with 3 meals a day and a hs snack\par He will monitor his glucose tid and observe for ant hypoglycemia\par Diet and ex discussed\par He will call if any hypo/hyperglycemia\par Ret 4 mo

## 2020-12-31 ENCOUNTER — TRANSCRIPTION ENCOUNTER (OUTPATIENT)
Age: 55
End: 2020-12-31

## 2021-01-11 ENCOUNTER — APPOINTMENT (OUTPATIENT)
Dept: ENDOCRINOLOGY | Facility: CLINIC | Age: 56
End: 2021-01-11

## 2021-02-25 NOTE — BRIEF OPERATIVE NOTE - NSEVIDENCEINFORABS_GEN_ALL_CORE
No Thalidomide Pregnancy And Lactation Text: This medication is Pregnancy Category X and is absolutely contraindicated during pregnancy. It is unknown if it is excreted in breast milk.

## 2021-03-26 ENCOUNTER — APPOINTMENT (OUTPATIENT)
Dept: ENDOCRINOLOGY | Facility: CLINIC | Age: 56
End: 2021-03-26

## 2021-03-31 NOTE — ED PROVIDER NOTE - DURATION
Patient's wife called back with the number to [facility] longterm which is: [phone]  day(s)/3

## 2021-04-21 ENCOUNTER — APPOINTMENT (OUTPATIENT)
Dept: ENDOCRINOLOGY | Facility: CLINIC | Age: 56
End: 2021-04-21

## 2021-06-13 ENCOUNTER — RESULT CHARGE (OUTPATIENT)
Age: 56
End: 2021-06-13

## 2021-06-14 ENCOUNTER — APPOINTMENT (OUTPATIENT)
Dept: FAMILY MEDICINE | Facility: CLINIC | Age: 56
End: 2021-06-14
Payer: COMMERCIAL

## 2021-06-14 ENCOUNTER — NON-APPOINTMENT (OUTPATIENT)
Age: 56
End: 2021-06-14

## 2021-06-14 VITALS
SYSTOLIC BLOOD PRESSURE: 140 MMHG | WEIGHT: 214 LBS | BODY MASS INDEX: 30.64 KG/M2 | TEMPERATURE: 96 F | HEART RATE: 54 BPM | HEIGHT: 70 IN | DIASTOLIC BLOOD PRESSURE: 80 MMHG | OXYGEN SATURATION: 98 %

## 2021-06-14 DIAGNOSIS — N40.0 BENIGN PROSTATIC HYPERPLASIA WITHOUT LOWER URINARY TRACT SYMPMS: ICD-10-CM

## 2021-06-14 DIAGNOSIS — Z00.00 ENCOUNTER FOR GENERAL ADULT MEDICAL EXAMINATION W/OUT ABNORMAL FINDINGS: ICD-10-CM

## 2021-06-14 PROCEDURE — 36415 COLL VENOUS BLD VENIPUNCTURE: CPT

## 2021-06-14 PROCEDURE — 99396 PREV VISIT EST AGE 40-64: CPT | Mod: 25

## 2021-06-14 PROCEDURE — 99072 ADDL SUPL MATRL&STAF TM PHE: CPT

## 2021-06-14 PROCEDURE — 93000 ELECTROCARDIOGRAM COMPLETE: CPT

## 2021-06-14 NOTE — PLAN
[FreeTextEntry1] : Patient is advised to continue with his current diet and exercise, along with his current rx management. He  is also advised to make sure that he follows up with the ophthalmologist and dentist annually which he does do. He is also advised to make sure to also follow up with the dermatologist for routine skin checks, which  will do in the near future.  He is also advised to make sure that he exercises when possible, and follows up for any medical issues that arise. His labs will be checked as noted above, and further med adjustments will be made at that time.  All vaccines are currently utd.  He remains utd with cardio and endo, and last saw rheum 1 year ago.  His uric acid will also be checked today, to make sure that this does also remain within normal range. \par \par His vitamin levels will also be rechecked.  He does take vitamins and B12, and if needed, further supplementation can be made pending the results of his labs.

## 2021-06-14 NOTE — HISTORY OF PRESENT ILLNESS
[FreeTextEntry1] : Routine PE [de-identified] : Patient states that his last visit with ophtho was several months ago following 2 retinal tears.  He is also utd with the dentist, and his last skin check was 3 years ago.  He does follow a normal diet, and he exercises when possible.  \par \par He does have some persisting fatigue, but his headaches have improved with time. He did have bariatric surgery 2 years ago, and he states that he does try to maintain B12 supplementation and liquid vitamins.  \par \par He is also utd with cardio, with his last visit being 9/2020.  He will follow up as scheduled, but he does deny any acute cardiac sxs.  \par \par Patient also complains of locking of his right thumb over the past 3 mos.  He is able to unlock the joint without pain, and he states he is still able to preserve function.

## 2021-06-14 NOTE — HEALTH RISK ASSESSMENT
[Patient reported colonoscopy was normal] : Patient reported colonoscopy was normal [With Family] : lives with family [Employed] : employed [College] : College [] :  [# Of Children ___] : has [unfilled] children [Sexually Active] : sexually active [Reports normal functional visual acuity (ie: able to read med bottle)] : Reports normal functional visual acuity [Smoke Detector] : smoke detector [Carbon Monoxide Detector] : carbon monoxide detector [Safety elements used in home] : safety elements used in home [Seat Belt] :  uses seat belt [Sunscreen] : uses sunscreen [Feels Safe at Home] : Feels safe at home [Change in mental status noted] : No change in mental status noted [High Risk Behavior] : no high risk behavior [Reports changes in hearing] : Reports no changes in hearing [Reports changes in vision] : Reports no changes in vision [Reports changes in dental health] : Reports no changes in dental health [Guns at Home] : no guns at home [Travel to Developing Areas] : does not  travel to developing areas [TB Exposure] : is not being exposed to tuberculosis [ColonoscopyDate] : 2017 [ColonoscopyComments] : due in 2022 [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

## 2021-06-14 NOTE — PHYSICAL EXAM
[Normal] : affect was normal and insight and judgment were intact [No CVA Tenderness] : no CVA  tenderness [No Spinal Tenderness] : no spinal tenderness [No Joint Swelling] : no joint swelling [Grossly Normal Strength/Tone] : grossly normal strength/tone [Coordination Grossly Intact] : coordination grossly intact [No Focal Deficits] : no focal deficits [Normal Gait] : normal gait [Deep Tendon Reflexes (DTR)] : deep tendon reflexes were 2+ and symmetric [de-identified] : no locking of the right thumb noted.  motor function and strength equal bulaterally.

## 2021-06-17 ENCOUNTER — TRANSCRIPTION ENCOUNTER (OUTPATIENT)
Age: 56
End: 2021-06-17

## 2021-06-17 LAB
25(OH)D3 SERPL-MCNC: 35.3 NG/ML
BASOPHILS # BLD AUTO: 0.03 K/UL
BASOPHILS NFR BLD AUTO: 0.3 %
CHOLEST SERPL-MCNC: 108 MG/DL
CREAT SPEC-SCNC: 60 MG/DL
EBV EA AB SER IA-ACNC: 18.1 U/ML
EBV EA AB TITR SER IF: POSITIVE
EBV EA IGG SER QL IA: 382 U/ML
EBV EA IGG SER-ACNC: POSITIVE
EBV EA IGM SER IA-ACNC: NEGATIVE
EBV PATRN SPEC IB-IMP: NORMAL
EBV VCA IGG SER IA-ACNC: 307 U/ML
EBV VCA IGM SER QL IA: <10 U/ML
EOSINOPHIL # BLD AUTO: 0.22 K/UL
EOSINOPHIL NFR BLD AUTO: 2.2 %
EPSTEIN-BARR VIRUS CAPSID ANTIGEN IGG: POSITIVE
ESTIMATED AVERAGE GLUCOSE: 94 MG/DL
FOLATE SERPL-MCNC: 9.6 NG/ML
HBA1C MFR BLD HPLC: 4.9 %
HCT VFR BLD CALC: 45.8 %
HDLC SERPL-MCNC: 40 MG/DL
HGB BLD-MCNC: 15.5 G/DL
IMM GRANULOCYTES NFR BLD AUTO: 0.3 %
IRON SATN MFR SERPL: 34 %
IRON SERPL-MCNC: 92 UG/DL
LDLC SERPL CALC-MCNC: 54 MG/DL
LYMPHOCYTES # BLD AUTO: 1.59 K/UL
LYMPHOCYTES NFR BLD AUTO: 16.1 %
MAN DIFF?: NORMAL
MCHC RBC-ENTMCNC: 33 PG
MCHC RBC-ENTMCNC: 33.8 GM/DL
MCV RBC AUTO: 97.4 FL
MICROALBUMIN 24H UR DL<=1MG/L-MCNC: 6.3 MG/DL
MICROALBUMIN/CREAT 24H UR-RTO: 105 MG/G
MONOCYTES # BLD AUTO: 0.76 K/UL
MONOCYTES NFR BLD AUTO: 7.7 %
NEUTROPHILS # BLD AUTO: 7.24 K/UL
NEUTROPHILS NFR BLD AUTO: 73.4 %
NONHDLC SERPL-MCNC: 69 MG/DL
PLATELET # BLD AUTO: 250 K/UL
PSA SERPL-MCNC: 1.17 NG/ML
RBC # BLD: 4.7 M/UL
RBC # FLD: 12.5 %
TIBC SERPL-MCNC: 269 UG/DL
TRIGL SERPL-MCNC: 74 MG/DL
TSH SERPL-ACNC: 2.4 UIU/ML
UIBC SERPL-MCNC: 177 UG/DL
URATE SERPL-MCNC: 5.8 MG/DL
VIT B12 SERPL-MCNC: 422 PG/ML
WBC # FLD AUTO: 9.87 K/UL

## 2021-06-20 ENCOUNTER — TRANSCRIPTION ENCOUNTER (OUTPATIENT)
Age: 56
End: 2021-06-20

## 2021-06-21 ENCOUNTER — APPOINTMENT (OUTPATIENT)
Dept: FAMILY MEDICINE | Facility: CLINIC | Age: 56
End: 2021-06-21
Payer: COMMERCIAL

## 2021-06-21 VITALS
HEIGHT: 70 IN | DIASTOLIC BLOOD PRESSURE: 80 MMHG | WEIGHT: 213 LBS | HEART RATE: 53 BPM | TEMPERATURE: 97 F | SYSTOLIC BLOOD PRESSURE: 125 MMHG | OXYGEN SATURATION: 98 % | BODY MASS INDEX: 30.49 KG/M2

## 2021-06-21 DIAGNOSIS — E11.40 TYPE 2 DIABETES MELLITUS WITH DIABETIC NEUROPATHY, UNSPECIFIED: ICD-10-CM

## 2021-06-21 DIAGNOSIS — R53.83 OTHER MALAISE: ICD-10-CM

## 2021-06-21 DIAGNOSIS — R53.81 OTHER MALAISE: ICD-10-CM

## 2021-06-21 PROCEDURE — 36415 COLL VENOUS BLD VENIPUNCTURE: CPT

## 2021-06-21 PROCEDURE — 99213 OFFICE O/P EST LOW 20 MIN: CPT | Mod: 25

## 2021-06-21 PROCEDURE — 99072 ADDL SUPL MATRL&STAF TM PHE: CPT

## 2021-06-21 NOTE — PLAN
[FreeTextEntry1] : Patient will have his electrolytes and PTH checked as requested.  He will remain out of work for the coming 3 weeks, and will return on 7/12 for re-evaluation.  He will continue to eat well, sleep well and will take the B12 supplements as advised.  A letter for work will be provided for him as requested as well, and he will continue with the plan above.  He will also follow up with cardio, and will contact the office with any changes made there as well. \par \par We have discussed that anxiety may certainly be a component in some of the symptoms he is feeling.  He has agrees to follow up with a therapist to discuss this, but felt that medication was not yet needed.  While he did initially decline a low dose of an SSRI during his visit today, he did call back stating that he is now willing to start a low dose of this, and will also reach out to a therapist during his time at home.  We have discussed options, and I do feel that Sertraline or Lexapro would be a good option for him.  He has opted for Lexapro, and is willing to start 5 mg nightly, and will touch base at his upcoming visit with his progress.  At that time, further dose alterations can be made.

## 2021-06-21 NOTE — HISTORY OF PRESENT ILLNESS
[FreeTextEntry1] : discuss lab results, persisting fatigue [de-identified] : Patient presents to discuss his recent labs.  He also presents stating that he does still feel fatigued.  Via portal message, he was advised that his B12 level was suboptimal, and B12 supplementation was recommended.  His EBV titre was also mildly elevated, which can happen periodically, also contributing to fatigue.  He did have a normal urine microalbumin level, but his ratio is slightly abnormal.  His diabetes, which is a common cause for this abnormality is currently well controlled.  Blood pressure is also another common cause, but his is also well controlled.  He does monitor his pressure at home however, and states that his blood pressures are normal/low when tested at home, generally from 120-130/70-80's.  Since his last visit, he also states that his legs do feel slightly weaker, he is still tired and feels that he is more exhausted.  He states that he does feel that he is increasing his fluids, but does not feel that he is urinating as much as he should.  He does plan to follow up with cardio in the near future as well to discuss his current symptoms and possible altering his dose of Lipitor.  \par \par He does also add that he did have poison ivy following his visit, and he was placed on prednisone.

## 2021-06-21 NOTE — PHYSICAL EXAM
[Normal] : normal sclera/conjunctiva, pupils are equal, round and reactive to light and extraocular movements are intact [No Acute Distress] : no acute distress [Well Nourished] : well nourished [Well Developed] : well developed [Normal Affect] : the affect was normal [Normal Insight/Judgement] : insight and judgment were intact [de-identified] : tired appearing

## 2021-06-22 ENCOUNTER — TRANSCRIPTION ENCOUNTER (OUTPATIENT)
Age: 56
End: 2021-06-22

## 2021-06-22 LAB
ALBUMIN SERPL ELPH-MCNC: 4.2 G/DL
ALP BLD-CCNC: 108 U/L
ALT SERPL-CCNC: 27 U/L
ANION GAP SERPL CALC-SCNC: 8 MMOL/L
AST SERPL-CCNC: 27 U/L
BILIRUB SERPL-MCNC: 0.9 MG/DL
BUN SERPL-MCNC: 29 MG/DL
CALCIUM SERPL-MCNC: 9.8 MG/DL
CALCIUM SERPL-MCNC: 9.8 MG/DL
CHLORIDE SERPL-SCNC: 105 MMOL/L
CO2 SERPL-SCNC: 33 MMOL/L
CREAT SERPL-MCNC: 1.23 MG/DL
GLUCOSE SERPL-MCNC: 90 MG/DL
MAGNESIUM SERPL-MCNC: 2.2 MG/DL
PARATHYROID HORMONE INTACT: 43 PG/ML
POTASSIUM SERPL-SCNC: 5 MMOL/L
PROT SERPL-MCNC: 6.6 G/DL
SODIUM SERPL-SCNC: 145 MMOL/L

## 2021-06-23 ENCOUNTER — TRANSCRIPTION ENCOUNTER (OUTPATIENT)
Age: 56
End: 2021-06-23

## 2021-06-25 ENCOUNTER — TRANSCRIPTION ENCOUNTER (OUTPATIENT)
Age: 56
End: 2021-06-25

## 2021-07-12 ENCOUNTER — APPOINTMENT (OUTPATIENT)
Dept: FAMILY MEDICINE | Facility: CLINIC | Age: 56
End: 2021-07-12

## 2021-07-14 ENCOUNTER — RX CHANGE (OUTPATIENT)
Age: 56
End: 2021-07-14

## 2021-07-15 NOTE — ASU PATIENT PROFILE, ADULT - CAREGIVER
Rounded with patient in waiting room. Vital signs rechecked. All questions answered. Apologized for wait time. No further needs at this time.   Declines

## 2021-07-28 ENCOUNTER — RX CHANGE (OUTPATIENT)
Age: 56
End: 2021-07-28

## 2021-08-24 ENCOUNTER — APPOINTMENT (OUTPATIENT)
Dept: FAMILY MEDICINE | Facility: CLINIC | Age: 56
End: 2021-08-24
Payer: COMMERCIAL

## 2021-08-24 VITALS
OXYGEN SATURATION: 98 % | SYSTOLIC BLOOD PRESSURE: 158 MMHG | DIASTOLIC BLOOD PRESSURE: 104 MMHG | BODY MASS INDEX: 30.21 KG/M2 | HEART RATE: 63 BPM | HEIGHT: 70 IN | TEMPERATURE: 96.9 F | WEIGHT: 211 LBS

## 2021-08-24 DIAGNOSIS — R31.0 GROSS HEMATURIA: ICD-10-CM

## 2021-08-24 DIAGNOSIS — R09.81 NASAL CONGESTION: ICD-10-CM

## 2021-08-24 PROCEDURE — 99214 OFFICE O/P EST MOD 30 MIN: CPT

## 2021-08-24 NOTE — PLAN
[FreeTextEntry1] : Patient is advised to use Flonase as needed, and he may also use Mucinex.  He is advised to hydrate well, and to rest, and may also use Tylenol/Advil and warm compresses as needed for his symptoms.  Due to the duration of his symptoms, he will also be started on antibiotics, which he will take until completed.  If there is no improvement, he will then follow up with the office.\par \par He was reassured regarding his lab work, and is advised to continue with his current regimen.  He will also increase his B12, but will decrease his B complex.  He will also continue to hydrate well, and will follow up as needed. \par \par His urine will also be repeated today.  We will send out for a urine cytology and urinalysis.  If abnormal, he will be referred to urology for further evaluation.

## 2021-08-24 NOTE — PHYSICAL EXAM
[No Acute Distress] : no acute distress [Well Developed] : well developed [Normal] : affect was normal and insight and judgment were intact [de-identified] : tired appearing [de-identified] : EAC clear b/l- effusion b/l, slight erythema, no TM bulging.  nares edematous b/l- L>R, PND noted,

## 2021-08-24 NOTE — HISTORY OF PRESENT ILLNESS
[FreeTextEntry8] : Patient states that he woke this am with a headache and brain fog.  He went to a functional neurologist.  Some labs were drawn, which he states is abnormal.  He had inflammation in his body, and did have some blood in his urine.  He states that he did also have mild sinus congestion and ear fullness, which he states has dissipated.  He states that he is more concerned about the labs at his time, and fees that his congestion will pass.

## 2021-08-30 ENCOUNTER — TRANSCRIPTION ENCOUNTER (OUTPATIENT)
Age: 56
End: 2021-08-30

## 2021-08-30 LAB
APPEARANCE: CLEAR
BACTERIA: NEGATIVE
BILIRUBIN URINE: NEGATIVE
BLOOD URINE: NEGATIVE
COLOR: YELLOW
GLUCOSE QUALITATIVE U: NEGATIVE
HYALINE CASTS: 1 /LPF
KETONES URINE: NEGATIVE
LEUKOCYTE ESTERASE URINE: NEGATIVE
MICROSCOPIC-UA: NORMAL
NITRITE URINE: NEGATIVE
PH URINE: 6
PROTEIN URINE: ABNORMAL
RED BLOOD CELLS URINE: 4 /HPF
SPECIFIC GRAVITY URINE: 1.02
SQUAMOUS EPITHELIAL CELLS: 0 /HPF
URINE CYTOLOGY: NORMAL
UROBILINOGEN URINE: ABNORMAL
WHITE BLOOD CELLS URINE: 1 /HPF

## 2021-09-07 ENCOUNTER — TRANSCRIPTION ENCOUNTER (OUTPATIENT)
Age: 56
End: 2021-09-07

## 2021-09-25 ENCOUNTER — EMERGENCY (EMERGENCY)
Facility: HOSPITAL | Age: 56
LOS: 0 days | Discharge: HOME | End: 2021-09-25
Attending: EMERGENCY MEDICINE
Payer: COMMERCIAL

## 2021-09-25 VITALS
WEIGHT: 205.91 LBS | TEMPERATURE: 99 F | OXYGEN SATURATION: 100 % | DIASTOLIC BLOOD PRESSURE: 88 MMHG | HEIGHT: 70 IN | RESPIRATION RATE: 19 BRPM | SYSTOLIC BLOOD PRESSURE: 137 MMHG | HEART RATE: 78 BPM

## 2021-09-25 DIAGNOSIS — Z87.442 PERSONAL HISTORY OF URINARY CALCULI: ICD-10-CM

## 2021-09-25 DIAGNOSIS — S61.211A LACERATION WITHOUT FOREIGN BODY OF LEFT INDEX FINGER WITHOUT DAMAGE TO NAIL, INITIAL ENCOUNTER: ICD-10-CM

## 2021-09-25 DIAGNOSIS — Z79.899 OTHER LONG TERM (CURRENT) DRUG THERAPY: ICD-10-CM

## 2021-09-25 DIAGNOSIS — E78.5 HYPERLIPIDEMIA, UNSPECIFIED: ICD-10-CM

## 2021-09-25 DIAGNOSIS — S61.215A LACERATION WITHOUT FOREIGN BODY OF LEFT RING FINGER WITHOUT DAMAGE TO NAIL, INITIAL ENCOUNTER: ICD-10-CM

## 2021-09-25 DIAGNOSIS — Z79.02 LONG TERM (CURRENT) USE OF ANTITHROMBOTICS/ANTIPLATELETS: ICD-10-CM

## 2021-09-25 DIAGNOSIS — S62.631B DISPLACED FRACTURE OF DISTAL PHALANX OF LEFT INDEX FINGER, INITIAL ENCOUNTER FOR OPEN FRACTURE: ICD-10-CM

## 2021-09-25 DIAGNOSIS — Z95.5 PRESENCE OF CORONARY ANGIOPLASTY IMPLANT AND GRAFT: Chronic | ICD-10-CM

## 2021-09-25 DIAGNOSIS — Z98.84 BARIATRIC SURGERY STATUS: ICD-10-CM

## 2021-09-25 DIAGNOSIS — I10 ESSENTIAL (PRIMARY) HYPERTENSION: ICD-10-CM

## 2021-09-25 DIAGNOSIS — I25.2 OLD MYOCARDIAL INFARCTION: ICD-10-CM

## 2021-09-25 DIAGNOSIS — Z98.84 BARIATRIC SURGERY STATUS: Chronic | ICD-10-CM

## 2021-09-25 DIAGNOSIS — Z95.5 PRESENCE OF CORONARY ANGIOPLASTY IMPLANT AND GRAFT: ICD-10-CM

## 2021-09-25 DIAGNOSIS — Z98.890 OTHER SPECIFIED POSTPROCEDURAL STATES: Chronic | ICD-10-CM

## 2021-09-25 DIAGNOSIS — G60.9 HEREDITARY AND IDIOPATHIC NEUROPATHY, UNSPECIFIED: ICD-10-CM

## 2021-09-25 DIAGNOSIS — Z23 ENCOUNTER FOR IMMUNIZATION: ICD-10-CM

## 2021-09-25 DIAGNOSIS — K21.9 GASTRO-ESOPHAGEAL REFLUX DISEASE WITHOUT ESOPHAGITIS: ICD-10-CM

## 2021-09-25 DIAGNOSIS — I25.10 ATHEROSCLEROTIC HEART DISEASE OF NATIVE CORONARY ARTERY WITHOUT ANGINA PECTORIS: ICD-10-CM
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
## 2021-09-25 DIAGNOSIS — S61.213A LACERATION WITHOUT FOREIGN BODY OF LEFT MIDDLE FINGER WITHOUT DAMAGE TO NAIL, INITIAL ENCOUNTER: ICD-10-CM
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
## 2021-09-25 DIAGNOSIS — Z79.82 LONG TERM (CURRENT) USE OF ASPIRIN: ICD-10-CM

## 2021-09-25 PROCEDURE — 90471 IMMUNIZATION ADMIN: CPT

## 2021-09-25 PROCEDURE — 73130 X-RAY EXAM OF HAND: CPT | Mod: 26,LT,76

## 2021-09-25 PROCEDURE — 73130 X-RAY EXAM OF HAND: CPT | Mod: LT

## 2021-09-25 PROCEDURE — 99284 EMERGENCY DEPT VISIT MOD MDM: CPT

## 2021-09-25 PROCEDURE — 99284 EMERGENCY DEPT VISIT MOD MDM: CPT | Mod: 25

## 2021-09-25 PROCEDURE — 96374 THER/PROPH/DIAG INJ IV PUSH: CPT

## 2021-09-25 PROCEDURE — 90715 TDAP VACCINE 7 YRS/> IM: CPT

## 2021-09-25 RX ORDER — CEPHALEXIN 500 MG
1 CAPSULE ORAL
Qty: 20 | Refills: 0
Start: 2021-09-25 | End: 2021-09-29

## 2021-09-25 RX ORDER — TETANUS TOXOID, REDUCED DIPHTHERIA TOXOID AND ACELLULAR PERTUSSIS VACCINE, ADSORBED 5; 2.5; 8; 8; 2.5 [IU]/.5ML; [IU]/.5ML; UG/.5ML; UG/.5ML; UG/.5ML
0.5 SUSPENSION INTRAMUSCULAR ONCE
Refills: 0 | Status: COMPLETED | OUTPATIENT
Start: 2021-09-25 | End: 2021-09-25

## 2021-09-25 RX ORDER — CEFAZOLIN SODIUM 1 G
1000 VIAL (EA) INJECTION ONCE
Refills: 0 | Status: COMPLETED | OUTPATIENT
Start: 2021-09-25 | End: 2021-09-25

## 2021-09-25 RX ORDER — CEFAZOLIN SODIUM 1 G
1000 VIAL (EA) INJECTION ONCE
Refills: 0 | Status: DISCONTINUED | OUTPATIENT
Start: 2021-09-25 | End: 2021-09-25

## 2021-09-25 RX ADMIN — Medication 1000 MILLIGRAM(S): at 11:30

## 2021-09-25 RX ADMIN — TETANUS TOXOID, REDUCED DIPHTHERIA TOXOID AND ACELLULAR PERTUSSIS VACCINE, ADSORBED 0.5 MILLILITER(S): 5; 2.5; 8; 8; 2.5 SUSPENSION INTRAMUSCULAR at 11:30

## 2021-09-25 NOTE — ED STATDOCS - PROGRESS NOTE DETAILS
56 yr. old male presents to ED with laceration to left 2nd,3rd and 4th fingers on electric hedge cutter today at 9 am. Last Tdap over 5 years. Seen and examined by attending in intake. Plan: X=ray of left hand fingers, Tdap, Ancef and Ortho Hand consult. Will F/U with results and re evaluate. Peggy FOSTER Dr. Kramer called and consulted requesting repeat X-Rays of individual fingers. X-Ray tech called and discussed repeat X-Ray. Peggy FOSTER Seen and treated by Dr. Kramer at bedside.MTangronnie NP Left hand X-Ray wet read add +bone chip fracture of left distal phalanx of index finger. Dr. Kramer and patient aware. Peggy FOSTER

## 2021-09-25 NOTE — ED STATDOCS - CARE PROVIDER_API CALL
Chinmay Paniagua)  Orthopaedic Surgery; Surgery of the Hand  [address] [place] [address]  Phone: [phone]  Fax: [phone]  Follow Up Time:

## 2021-09-25 NOTE — ED ADULT NURSE NOTE - OBJECTIVE STATEMENT
pt presents to the ED for evaluation of laceration to left 2nd, 3rd, 4th fingers. Pt states he cut his hand while using an electric  which slipped from his hand. Pt thinks it was a superficial injury. Denies numbness or tingling. Is able to bend his fingers. Tetanus not UTD. 20 g placed to right AC for abx administration.

## 2021-09-25 NOTE — ED STATDOCS - PATIENT PORTAL LINK FT
You can access the FollowMyHealth Patient Portal offered by [facility] by registering at the following website: [email or web address]. By joining [employer]’s FollowMyHealth portal, you will also be able to view your health information using other applications (apps) compatible with our system.

## 2021-09-25 NOTE — ED STATDOCS - OBJECTIVE STATEMENT
55 y/o male with PMHx of HTN, HLD, MI s/p stent (in 2014), morbid obesity s/p gastric sleeve (in March 2019) presents to the ED for evaluation of laceration  to left 2nd, 3rd, 4th fingers. Pt states he cut his hand while using an electric  which slipped from his hand. Pt thinks it was a superficial injury. Denies numbness or tingling. Is able to bend his fingers. Tetanus not UTD. Hand: Dr. Bravo. NKDA.

## 2021-09-25 NOTE — ED STATDOCS - CARE PLAN
1 Principal Discharge DX:	Laceration of finger, left   Principal Discharge DX:	Laceration of finger, left  Secondary Diagnosis:	Open fracture of distal phalanx of left index finger

## 2021-09-25 NOTE — ED ADULT TRIAGE NOTE - CHIEF COMPLAINT QUOTE
pt lacerations to pinky, ring & middle finger on left hand s/p electric hedge trimming accident. unknown last tetanus shot.

## 2021-09-25 NOTE — ED STATDOCS - SKIN, MLM
+3 lacerations, each about 2cm in length, to distal 2nd, 3rd and 4th digits. Laceration to 2nd digit involves the nail, but no unroofing of the nail, no subungual hematoma. NVI

## 2021-09-25 NOTE — ED ADULT NURSE NOTE - NSIMPLEMENTINTERV_GEN_ALL_ED
Implemented All Universal Safety Interventions:  [place] to call system. Call bell, personal items and telephone within reach. Instruct patient to call for assistance. Room bathroom lighting operational. Non-slip footwear when patient is off stretcher. Physically safe environment: no spills, clutter or unnecessary equipment. Stretcher in lowest position, wheels locked, appropriate side rails in place.

## 2021-09-25 NOTE — ED STATDOCS - NSFOLLOWUPINSTRUCTIONS_ED_ALL_ED_FT
Laceration    WHAT YOU NEED TO KNOW:    A laceration is an injury to the skin and the soft tissue underneath it. Lacerations happen when you are cut or hit by something. They can happen anywhere on the body.     DISCHARGE INSTRUCTIONS:    Return to the emergency department if:     You have heavy bleeding or bleeding that does not stop after 10 minutes of holding firm, direct pressure over the wound.       Your wound opens up.     Contact your healthcare provider if:     You have a fever or chills.       Your laceration is red, warm, or swollen.      You have red streaks on your skin coming from your wound.      You have white or yellow drainage from the wound that smells bad.      You have pain that gets worse, even after treatment.       You have questions or concerns about your condition or care.     Medicines:     Prescription pain medicine may be given. Ask how to take this medicine safely.       Antibiotics help treat or prevent a bacterial infection.       Take your medicine as directed. Contact your healthcare provider if you think your medicine is not helping or if you have side effects. Tell him or her if you are allergic to any medicine. Keep a list of the medicines, vitamins, and herbs you take. Include the amounts, and when and why you take them. Bring the list or the pill bottles to follow-up visits. Carry your medicine list with you in case of an emergency.    Care for your wound as directed:     Do not get your wound wet until your healthcare provider says it is okay. Do not soak your wound in water. Do not go swimming until your healthcare provider says it is okay. Carefully wash the wound with soap and water. Gently pat the area dry or allow it to air dry.       Change your bandages when they get wet, dirty, or after washing. Apply new, clean bandages as directed. Do not apply elastic bandages or tape too tight. Do not put powders or lotions over your incision.       Apply antibiotic ointment as directed. Your healthcare provider may give you antibiotic ointment to put over your wound if you have stitches. If you have strips of tape over your incision, let them dry up and fall off on their own. If they do not fall off within 14 days, gently remove them. If you have glue over your wound, do not remove or pick at it. If your glue comes off, do not replace it with glue that you have at home.       Check your wound every day for signs of infection such as swelling, redness, or pus.     Self-care:     Apply ice on your wound for 15 to 20 minutes every hour or as directed. Use an ice pack, or put crushed ice in a plastic bag. Cover it with a towel. Ice helps prevent tissue damage and decreases swelling and pain.      Use a splint as directed. A splint will decrease movement and stress on your wound. It may help it heal faster. A splint may be used for lacerations over joints or areas of your body that bend. Ask your healthcare provider how to apply and remove a splint.       Decrease scarring of your wound by applying ointments as directed. Do not apply ointments until your healthcare provider says it is okay. You may need to wait until your wound is healed. Ask which ointment to buy and how often to use it. After your wound is healed, use sunscreen over the area when you are out in the sun. You should do this for at least 6 months to 1 year after your injury.     Follow up with your healthcare provider as directed: You may need to follow up in 24 to 48 hours to have your wound checked for infection. You will need to return in 3 to 14 days if you have stitches or staples so they can be removed. Care for your wound as directed to prevent infection and help it heal. Write down your questions so you remember to ask them during your visits.    Maintain bandage .  Tylenol 650 mg. PO every 4 hrs. for pain.  Follow up with Dr. Paniagua. Laceration    WHAT YOU NEED TO KNOW:    A laceration is an injury to the skin and the soft tissue underneath it. Lacerations happen when you are cut or hit by something. They can happen anywhere on the body.     DISCHARGE INSTRUCTIONS:    Return to the emergency department if:     You have heavy bleeding or bleeding that does not stop after 10 minutes of holding firm, direct pressure over the wound.       Your wound opens up.     Contact your healthcare provider if:     You have a fever or chills.       Your laceration is red, warm, or swollen.      You have red streaks on your skin coming from your wound.      You have white or yellow drainage from the wound that smells bad.      You have pain that gets worse, even after treatment.       You have questions or concerns about your condition or care.     Medicines:     Prescription pain medicine may be given. Ask how to take this medicine safely.       Antibiotics help treat or prevent a bacterial infection.       Take your medicine as directed. Contact your healthcare provider if you think your medicine is not helping or if you have side effects. Tell him or her if you are allergic to any medicine. Keep a list of the medicines, vitamins, and herbs you take. Include the amounts, and when and why you take them. Bring the list or the pill bottles to follow-up visits. Carry your medicine list with you in case of an emergency.    Care for your wound as directed:     Do not get your wound wet until your healthcare provider says it is okay. Do not soak your wound in water. Do not go swimming until your healthcare provider says it is okay. Carefully wash the wound with soap and water. Gently pat the area dry or allow it to air dry.       Change your bandages when they get wet, dirty, or after washing. Apply new, clean bandages as directed. Do not apply elastic bandages or tape too tight. Do not put powders or lotions over your incision.       Apply antibiotic ointment as directed. Your healthcare provider may give you antibiotic ointment to put over your wound if you have stitches. If you have strips of tape over your incision, let them dry up and fall off on their own. If they do not fall off within 14 days, gently remove them. If you have glue over your wound, do not remove or pick at it. If your glue comes off, do not replace it with glue that you have at home.       Check your wound every day for signs of infection such as swelling, redness, or pus.     Self-care:     Apply ice on your wound for 15 to 20 minutes every hour or as directed. Use an ice pack, or put crushed ice in a plastic bag. Cover it with a towel. Ice helps prevent tissue damage and decreases swelling and pain.      Use a splint as directed. A splint will decrease movement and stress on your wound. It may help it heal faster. A splint may be used for lacerations over joints or areas of your body that bend. Ask your healthcare provider how to apply and remove a splint.       Decrease scarring of your wound by applying ointments as directed. Do not apply ointments until your healthcare provider says it is okay. You may need to wait until your wound is healed. Ask which ointment to buy and how often to use it. After your wound is healed, use sunscreen over the area when you are out in the sun. You should do this for at least 6 months to 1 year after your injury.     Follow up with your healthcare provider as directed: You may need to follow up in 24 to 48 hours to have your wound checked for infection. You will need to return in 3 to 14 days if you have stitches or staples so they can be removed. Care for your wound as directed to prevent infection and help it heal. Write down your questions so you remember to ask them during your visits.    Maintain bandage .  Keflex as directed.  Tylenol 650 mg. PO every 4 hrs. for pain.  Follow up with Dr. Paniagua.

## 2021-10-15 NOTE — ASU PREOP CHECKLIST - BP NONINVASIVE SYSTOLIC (MM HG)
HEART FAILURE FELLOW PROGRESS NOTE    Subjective:    No acute events overnight.     Tele:    ROS negative.     Current Medications:   acetaminophen    Suspension .. 650 milliGRAM(s) Enteral Tube every 6 hours PRN  chlorhexidine 0.12% Liquid 15 milliLiter(s) Oral Mucosa two times a day  chlorhexidine 2% Cloths 1 Application(s) Topical <User Schedule>  dextrose 5% + sodium chloride 0.45% with potassium chloride 10 mEq/L 1000 milliLiter(s) IV Continuous <Continuous>  insulin lispro (ADMELOG) corrective regimen sliding scale   SubCutaneous every 6 hours  methimazole 20 milliGRAM(s) Oral <User Schedule>  metoclopramide Injectable 10 milliGRAM(s) IV Push every 8 hours  multivitamin 1 Tablet(s) Oral daily  ondansetron Injectable 4 milliGRAM(s) IV Push every 8 hours PRN  pantoprazole  Injectable 40 milliGRAM(s) IV Push every 12 hours  predniSONE   Tablet 30 milliGRAM(s) Oral daily  simethicone 80 milliGRAM(s) Chew every 8 hours PRN  spironolactone 12.5 milliGRAM(s) Oral every 12 hours  thiamine 100 milliGRAM(s) Oral daily  vancomycin    Solution 125 milliGRAM(s) Oral every 12 hours  vancomycin  IVPB 1000 milliGRAM(s) IV Intermittent every 12 hours      REVIEW OF SYSTEMS:  CONSTITUTIONAL: No weakness, fevers or chills  EYES/ENT: No visual changes;  No dysphagia  NECK: No pain or stiffness  RESPIRATORY: No cough, wheezing, hemoptysis; No shortness of breath  CARDIOVASCULAR: No chest pain or palpitations; No lower extremity edema  GASTROINTESTINAL: No abdominal or epigastric pain. No nausea, vomiting, or hematemesis; No diarrhea or constipation. No melena or hematochezia.  BACK: No back pain  GENITOURINARY: No dysuria, frequency or hematuria  NEUROLOGICAL: No numbness or weakness  SKIN: No itching, burning, rashes, or lesions   All other review of systems is negative unless indicated above.    Physical Exam:  T(F): 97.5 (10-15), Max: 97.7 (10-14)  HR: 98 (10-15) (65 - 112)  BP: --  BP(mean): --  ABP: 77/65 (10-15) (75/68 - 295/204)  ABP(mean): 72 (10-15) (69 - 295)  RR: 26 (10-15)  SpO2: 100% (10-15)  GENERAL: No acute distress, well-developed  HEAD:  Atraumatic, Normocephalic  ENT: EOMI, PERRLA, conjunctiva and sclera clear, Neck supple, No JVD, moist mucosa  CHEST/LUNG: Clear to auscultation bilaterally; No wheeze, equal breath sounds bilaterally   BACK: No spinal tenderness  HEART: Regular rate and rhythm; No murmurs, rubs, or gallops  ABDOMEN: Soft, Nontender, Nondistended; Bowel sounds present  EXTREMITIES:  No clubbing, cyanosis, or edema  PSYCH: Nl behavior, nl affect  NEUROLOGY: AAOx3, non-focal, cranial nerves intact  SKIN: Normal color, No rashes or lesions  LINES:    Cardiovascular Diagnostic Testing: personally reviewed    CXR: Personally reviewed    Labs: Personally reviewed                        10.4   13.29 )-----------( 145      ( 15 Oct 2021 01:14 )             33.3     10-15    135  |  98  |  5<L>  ----------------------------<  83  3.4<L>   |  26  |  0.36<L>    Ca    9.4      15 Oct 2021 01:13  Phos  1.9     10-15  Mg     1.7     10-15    TPro  7.3  /  Alb  4.0  /  TBili  1.5<H>  /  DBili  x   /  AST  24  /  ALT  33  /  AlkPhos  136<H>  10-15                       135 HEART FAILURE FELLOW PROGRESS NOTE    Subjective:    No acute events overnight.   ROS negative.     Current Medications:   acetaminophen    Suspension .. 650 milliGRAM(s) Enteral Tube every 6 hours PRN  chlorhexidine 0.12% Liquid 15 milliLiter(s) Oral Mucosa two times a day  chlorhexidine 2% Cloths 1 Application(s) Topical <User Schedule>  dextrose 5% + sodium chloride 0.45% with potassium chloride 10 mEq/L 1000 milliLiter(s) IV Continuous <Continuous>  insulin lispro (ADMELOG) corrective regimen sliding scale   SubCutaneous every 6 hours  methimazole 20 milliGRAM(s) Oral <User Schedule>  metoclopramide Injectable 10 milliGRAM(s) IV Push every 8 hours  multivitamin 1 Tablet(s) Oral daily  ondansetron Injectable 4 milliGRAM(s) IV Push every 8 hours PRN  pantoprazole  Injectable 40 milliGRAM(s) IV Push every 12 hours  predniSONE   Tablet 30 milliGRAM(s) Oral daily  simethicone 80 milliGRAM(s) Chew every 8 hours PRN  spironolactone 12.5 milliGRAM(s) Oral every 12 hours  thiamine 100 milliGRAM(s) Oral daily  vancomycin    Solution 125 milliGRAM(s) Oral every 12 hours  vancomycin  IVPB 1000 milliGRAM(s) IV Intermittent every 12 hours    Physical Exam:  T(F): 97.5 (10-15), Max: 97.7 (10-14)  HR: 98 (10-15) (65 - 112)  BP: --  BP(mean): --  ABP: 77/65 (10-15) (75/68 - 295/204)  ABP(mean): 72 (10-15) (69 - 295)  RR: 26 (10-15)  SpO2: 100% (10-15)    Physical Exam  General: thin appear and frail   Neck: Neck supple. JVP not elevated. No masses  Chest: +trach, +vent sounds   CV: +VAD hum  Abdomen: Soft, non-distended, tenderness noted in epigastric region, +keotube, Biliary drain   Neurology: Alert and oriented times three.     LVAD Interrogation  VAD TYPE HM 2  Speed 9200  Flow 5.7  Power 5.8  PI  6.8  Assessment of driveline exit site: driveline dressing c/d/i  Programming changes: no changes made      Cardiovascular Diagnostic Testing: personally reviewed    CXR: Personally reviewed    Labs: Personally reviewed                        10.4   13.29 )-----------( 145      ( 15 Oct 2021 01:14 )             33.3     10-15    135  |  98  |  5<L>  ----------------------------<  83  3.4<L>   |  26  |  0.36<L>    Ca    9.4      15 Oct 2021 01:13  Phos  1.9     10-15  Mg     1.7     10-15    TPro  7.3  /  Alb  4.0  /  TBili  1.5<H>  /  DBili  x   /  AST  24  /  ALT  33  /  AlkPhos  136<H>  10-15

## 2021-10-20 ENCOUNTER — TRANSCRIPTION ENCOUNTER (OUTPATIENT)
Age: 56
End: 2021-10-20

## 2021-10-21 ENCOUNTER — TRANSCRIPTION ENCOUNTER (OUTPATIENT)
Age: 56
End: 2021-10-21

## 2021-10-25 ENCOUNTER — LABORATORY RESULT (OUTPATIENT)
Age: 56
End: 2021-10-25

## 2021-10-27 ENCOUNTER — RX RENEWAL (OUTPATIENT)
Age: 56
End: 2021-10-27

## 2021-10-31 ENCOUNTER — TRANSCRIPTION ENCOUNTER (OUTPATIENT)
Age: 56
End: 2021-10-31

## 2021-11-02 ENCOUNTER — APPOINTMENT (OUTPATIENT)
Dept: PULMONOLOGY | Facility: CLINIC | Age: 56
End: 2021-11-02
Payer: COMMERCIAL

## 2021-11-02 VITALS — HEIGHT: 70 IN | HEART RATE: 50 BPM | WEIGHT: 204 LBS | OXYGEN SATURATION: 96 % | BODY MASS INDEX: 29.2 KG/M2

## 2021-11-02 VITALS — DIASTOLIC BLOOD PRESSURE: 70 MMHG | SYSTOLIC BLOOD PRESSURE: 110 MMHG

## 2021-11-02 DIAGNOSIS — Z86.39 PERSONAL HISTORY OF OTHER ENDOCRINE, NUTRITIONAL AND METABOLIC DISEASE: ICD-10-CM

## 2021-11-02 DIAGNOSIS — M45.4: ICD-10-CM

## 2021-11-02 PROCEDURE — 99204 OFFICE O/P NEW MOD 45 MIN: CPT

## 2021-11-19 ENCOUNTER — APPOINTMENT (OUTPATIENT)
Dept: DERMATOLOGY | Facility: CLINIC | Age: 56
End: 2021-11-19

## 2021-11-23 ENCOUNTER — FORM ENCOUNTER (OUTPATIENT)
Age: 56
End: 2021-11-23

## 2021-11-29 ENCOUNTER — OUTPATIENT (OUTPATIENT)
Dept: OUTPATIENT SERVICES | Facility: HOSPITAL | Age: 56
LOS: 1 days | End: 2021-11-29
Payer: COMMERCIAL

## 2021-11-29 ENCOUNTER — APPOINTMENT (OUTPATIENT)
Age: 56
End: 2021-11-29
Payer: COMMERCIAL

## 2021-11-29 DIAGNOSIS — Z98.890 OTHER SPECIFIED POSTPROCEDURAL STATES: Chronic | ICD-10-CM

## 2021-11-29 DIAGNOSIS — Z98.84 BARIATRIC SURGERY STATUS: Chronic | ICD-10-CM

## 2021-11-29 DIAGNOSIS — Z95.5 PRESENCE OF CORONARY ANGIOPLASTY IMPLANT AND GRAFT: Chronic | ICD-10-CM

## 2021-11-29 DIAGNOSIS — G47.33 OBSTRUCTIVE SLEEP APNEA (ADULT) (PEDIATRIC): ICD-10-CM

## 2021-11-29 PROCEDURE — 95800 SLP STDY UNATTENDED: CPT

## 2021-11-29 PROCEDURE — G0400: CPT

## 2021-11-29 NOTE — PATIENT PROFILE ADULT - LAST ORAL INTAKE
HPI Notes    Name: Mike Kohli  : 1941         Chief Complaint:     Chief Complaint   Patient presents with    Pneumonia     Patient here today for followup. History of Present Illness:        HPI  Pt is a 72yo male who presents for follow up. Pt was seen on 21 and dx with pneumonia. Pt was started on levaquin 750mg daily and mucinex twice daily. Pt has been doing incentive spirometry and acapella valve. Pt's daughter is a RN. Pt noting a decreased appetite, but he is trying to eat as tolerated.     Past Medical History:     Past Medical History:   Diagnosis Date    Nguyen syndrome     [facility]    Blood circulation, collateral     BPH (benign prostatic hyperplasia)     CAD (coronary artery disease)     Dr Luis Carlos Ledbetter COPD (chronic obstructive pulmonary disease) ([facility]     Depression     Hx of colonoscopy     Hyperlipidemia     Liver disease     Movement disorder     essential tremors    Osteoarthritis     Specialist at [facility]    Pneumonia     Rheumatic fever     Rheumatoid arthritis ([facility]     Seizures ([facility] [address] [facility]     jacksonian epilepsy    Type II or unspecified type diabetes mellitus without mention of complication, not stated as uncontrolled       Reviewed all health maintenance requirements and ordered appropriate tests  Health Maintenance Due   Topic Date Due    Hepatitis C screen  Never done    DTaP/Tdap/Td vaccine (1 - Tdap) Never done       Past Surgical History:     Past Surgical History:   Procedure Laterality Date    ARTERIAL BYPASS [address]    Cardiac bypass x2    CARDIAC SURGERY      CABG    CHOLECYSTECTOMY      COLONOSCOPY      WNL    CORONARY ARTERY BYPASS GRAFT  [id number]    CYSTOSCOPY Left     lithrotripsy with stent     CYSTOSCOPY Left 2017    CYSTOSCOPY URETEROSCOPY LASER-WITH HLL performed by Andree Hurtado MD at [address] / [place] [place] / STONE Left 2017    CYSTOSCOPY STENT INSERTION performed by Summer Bower Paige Barriga MD at [facility]. [address], COLON, DIAGNOSTIC      HERNIA REPAIR      x2   [facility]    rt    LEG SURGERY  1960    cadaver graft rt thigh    LITHOTRIPSY      OTHER SURGICAL HISTORY      Rt leg artery transplant    OTHER SURGICAL HISTORY      Rt heel spur    OTHER SURGICAL HISTORY      Rt knee scope    PACEMAKER INSERTION N/A 9/29/2021    PACEMAKER INSERTION PERMANENT performed by Nathan Louise MD at [facility] 09/29/2021    Dr. Stu Infante ENDOSCOPY  2014        Medications:       Prior to Admission medications    Medication Sig Start Date End Date Taking?  Authorizing Provider   levoFLOXacin (LEVAQUIN) 750 MG tablet Take 1 tablet by mouth daily for 7 days 11/24/21 12/1/21 Yes Shelby Iron, APRN - CNP   guaiFENesin (MUCINEX) 600 MG extended release tablet Take 2 tablets by mouth 2 times daily for 7 days 11/24/21 12/1/21 Yes Shelby Iron, APRN - CNP   sertraline (ZOLOFT) 100 MG tablet Take 1 tablet by mouth daily 10/27/21  Yes Marjan Mooer MD   metoprolol tartrate (LOPRESSOR) 25 MG tablet Take 1 tablet by mouth 2 times daily  Patient taking differently: Take 12.5 mg by mouth daily  9/30/21  Yes Nathan Louise MD   empagliflozin (JARDIANCE) 25 MG tablet Take 25 mg by mouth daily   Yes Historical Provider, MD   rivaroxaban (XARELTO) 20 MG TABS tablet Take 1 tablet by mouth daily 9/14/21  Yes Nathan Louise MD   Tiotropium Bromide Monohydrate ([address]) Inhale into the lungs daily   Yes Historical Provider, MD   naproxen (NAPROSYN) 500 MG tablet Take 500 mg by mouth daily as needed    Yes Historical Provider, MD   famotidine (PEPCID) 20 MG tablet Take 20 mg by mouth daily   Yes Historical Provider, MD   METHOTREXATE PO Take 10 mg by mouth once a week   Yes Historical Provider, MD   traZODone (DESYREL) 100 MG tablet Take 100 mg by mouth nightly   Yes Historical Provider, MD understanding. 05-Mar-2019 21:00

## 2021-11-30 ENCOUNTER — APPOINTMENT (OUTPATIENT)
Dept: DERMATOLOGY | Facility: CLINIC | Age: 56
End: 2021-11-30
Payer: COMMERCIAL

## 2021-11-30 ENCOUNTER — NON-APPOINTMENT (OUTPATIENT)
Age: 56
End: 2021-11-30

## 2021-11-30 PROCEDURE — 99204 OFFICE O/P NEW MOD 45 MIN: CPT

## 2021-11-30 NOTE — PHYSICAL EXAM
[Full Body Skin Exam Performed] : performed [FreeTextEntry3] : Skin examination performed of the face, neck, trunk, arms, legs; \par The patient is well, alert and oriented, pleasant and cooperative.\par Eyelids, conjunctivae, oral mucosa, digits and nails all normal.  \par No cervical adenopathy.\par \par Normal findings include:\par \par Seborrheic keratoses\par small IDN L nose;\par few nevi on toes; \par Angiomas\par Lentigines- large solar lentigo R temple\par \par No lesions were suspicious for malignancy. \par \par

## 2021-11-30 NOTE — HISTORY OF PRESENT ILLNESS
[de-identified] : Pt. presents for skin check;\par c/o few spots of concern;  \par Severity:  mild  \par Modifying factors:  none\par Associated symptoms:  none\par Context:  no association with activity\par

## 2021-11-30 NOTE — ASSESSMENT
[FreeTextEntry1] : Complete skin examination is negative for malignancy; Multiple new concerns were addressed and discussed.\par Therapeutic options and their risks and benefits; along with multiple diagnostic possibilities were discussed at length;\par risks and benefits of skin biopsy and/or other further study were discussed;\par \par Continue regular exams; multiple nevi

## 2021-12-08 ENCOUNTER — APPOINTMENT (OUTPATIENT)
Dept: PULMONOLOGY | Facility: CLINIC | Age: 56
End: 2021-12-08
Payer: COMMERCIAL

## 2021-12-08 DIAGNOSIS — E66.3 OVERWEIGHT: ICD-10-CM

## 2021-12-08 PROCEDURE — 99443: CPT

## 2021-12-16 NOTE — H&P ADULT - MINUTES
Patient assessed for readiness to ambulate. Vital Signs  Level of Consciousness: Alert (0)  Temp: 97.9 °F (36.6 °C)  Temp Source: Oral  Pulse (Heart Rate): 79  Heart Rate Source: Monitor  Cardiac Rhythm: Sinus Rhythm  Resp Rate: 18  BP: 103/66  MAP (Calculated): 78  BP 1 Location: Left upper arm  BP 1 Method: Automatic  BP Patient Position: At rest  MEWS Score: 1. Patient ambulated with assistance of 2 nurses. Patient ambulated with gait belt and walker. Patient walked to Bedside commode. Patient returned safely to bed. 65

## 2022-01-27 ENCOUNTER — RX RENEWAL (OUTPATIENT)
Age: 57
End: 2022-01-27

## 2022-03-21 ENCOUNTER — TRANSCRIPTION ENCOUNTER (OUTPATIENT)
Age: 57
End: 2022-03-21

## 2022-04-21 ENCOUNTER — RX RENEWAL (OUTPATIENT)
Age: 57
End: 2022-04-21

## 2022-06-13 ENCOUNTER — APPOINTMENT (OUTPATIENT)
Dept: DERMATOLOGY | Facility: CLINIC | Age: 57
End: 2022-06-13

## 2022-07-01 RX ORDER — BLOOD SUGAR DIAGNOSTIC
STRIP MISCELLANEOUS 3 TIMES DAILY
Qty: 1 | Refills: 3 | Status: ACTIVE | COMMUNITY
Start: 2022-07-01 | End: 1900-01-01

## 2022-07-22 ENCOUNTER — APPOINTMENT (OUTPATIENT)
Dept: FAMILY MEDICINE | Facility: CLINIC | Age: 57
End: 2022-07-22

## 2022-08-02 NOTE — PATIENT PROFILE ADULT - NSPROEDALEARNPREFOTH_GEN_A_NUR
Additional Notes: Patient consent was obtained to proceed with the visit and recommended plan of care after discussion of all risks and benefits, including the risks of COVID-19 exposure. Detail Level: Simple written material/skill demonstration/verbal instruction

## 2022-08-15 ENCOUNTER — APPOINTMENT (OUTPATIENT)
Dept: FAMILY MEDICINE | Facility: CLINIC | Age: 57
End: 2022-08-15

## 2022-08-15 VITALS
SYSTOLIC BLOOD PRESSURE: 126 MMHG | HEIGHT: 70 IN | HEART RATE: 69 BPM | OXYGEN SATURATION: 97 % | BODY MASS INDEX: 31.07 KG/M2 | DIASTOLIC BLOOD PRESSURE: 78 MMHG | TEMPERATURE: 97.4 F | WEIGHT: 217 LBS

## 2022-08-15 DIAGNOSIS — Z23 ENCOUNTER FOR IMMUNIZATION: ICD-10-CM

## 2022-08-15 PROCEDURE — 90471 IMMUNIZATION ADMIN: CPT

## 2022-08-15 PROCEDURE — 90715 TDAP VACCINE 7 YRS/> IM: CPT

## 2022-08-15 PROCEDURE — 99396 PREV VISIT EST AGE 40-64: CPT | Mod: 25

## 2022-08-15 RX ORDER — ASPIRIN 81 MG/1
81 TABLET, COATED ORAL
Qty: 30 | Refills: 0 | Status: DISCONTINUED | COMMUNITY
Start: 2022-07-13

## 2022-08-15 RX ORDER — AMOXICILLIN 875 MG/1
875 TABLET, FILM COATED ORAL
Qty: 20 | Refills: 0 | Status: DISCONTINUED | COMMUNITY
Start: 2022-07-07

## 2022-08-15 RX ORDER — OLMESARTAN MEDOXOMIL 20 MG/1
20 TABLET, FILM COATED ORAL
Qty: 30 | Refills: 0 | Status: DISCONTINUED | COMMUNITY
Start: 2022-06-01

## 2022-08-15 RX ORDER — LOSARTAN POTASSIUM 50 MG/1
50 TABLET, FILM COATED ORAL
Qty: 30 | Refills: 0 | Status: DISCONTINUED | COMMUNITY
Start: 2022-07-13

## 2022-08-15 NOTE — PLAN
[FreeTextEntry1] : See medication change above. Check labs as above. He is not fasting today and will go to the lab tomorrow. His wife is requesting a number of blood tests (see above). Will adjust any medications based upon lab results.\par \par Reviewed age-appropriate preventive screening tests with patient. He is due for Tdap and Shingrix. He agreed to Tdap today and will return for Shingrix on a Friday\par \par Discussed clean eating (e.g. Mediterranean style diet) and recommendations for regular exercise/staying as physically active as possible.\par \par Reviewed importance of good self care (e.g. meditation, yoga, adequate rest, regular exercise, magnesium, clean eating, etc.).\par \par Follow up for next physical in one year.\par

## 2022-08-15 NOTE — HISTORY OF PRESENT ILLNESS
[FreeTextEntry1] : MANASA ALEXANDRE is a 57 year old male here for a physical exam.  [de-identified] : His last physical exam was last year\par \par Vaccines:\par Tetanus is NOT up to date\par Shingrix is NOT up to date\par COVID vaccine is up to date\par \par His last dentist visit was less than one year ago\par His last eye doctor appointment was less than one year ago\par His last dermatologist visit was less than one year ago\par \par Colon cancer screening is up to date (colonoscopy 10/19/2017, repeat 10 years)\par \par His diet is healthy overall\par Exercise: rarely

## 2022-08-15 NOTE — ASSESSMENT
[FreeTextEntry1] : MANASA ALEXANDRE is a 57 year old male here for a physical exam.\par \par Patient has a history of diet controlled diabetes, hypertension, CAD, sleep apnea, GERD, gout, anxiety, and obesity. He feels that his anxiety is improved on Lexapro but he does still get chest pain and panic attacks. His wife would like him to increase his dose from 10 to 15 mg daily.\par

## 2022-08-15 NOTE — HEALTH RISK ASSESSMENT
[0] : 2) Feeling down, depressed, or hopeless: Not at all (0) [PHQ-2 Negative - No further assessment needed] : PHQ-2 Negative - No further assessment needed [[id number]] : 0

## 2022-08-17 ENCOUNTER — TRANSCRIPTION ENCOUNTER (OUTPATIENT)
Age: 57
End: 2022-08-17

## 2022-08-17 LAB
25(OH)D3 SERPL-MCNC: 37.6 NG/ML
ALBUMIN SERPL ELPH-MCNC: 4.4 G/DL
ALP BLD-CCNC: 103 U/L
ALT SERPL-CCNC: 18 U/L
ANION GAP SERPL CALC-SCNC: 13 MMOL/L
AST SERPL-CCNC: 27 U/L
BASOPHILS # BLD AUTO: 0.03 K/UL
BASOPHILS NFR BLD AUTO: 0.4 %
BILIRUB SERPL-MCNC: 1.4 MG/DL
BUN SERPL-MCNC: 20 MG/DL
CALCIUM SERPL-MCNC: 9.8 MG/DL
CALCIUM SERPL-MCNC: 9.8 MG/DL
CHLORIDE SERPL-SCNC: 103 MMOL/L
CHOLEST SERPL-MCNC: 121 MG/DL
CO2 SERPL-SCNC: 26 MMOL/L
CREAT SERPL-MCNC: 1.15 MG/DL
CRP SERPL HS-MCNC: 0.88 MG/L
EGFR: 75 ML/MIN/1.73M2
EOSINOPHIL # BLD AUTO: 0.24 K/UL
EOSINOPHIL NFR BLD AUTO: 3.3 %
ESTIMATED AVERAGE GLUCOSE: 100 MG/DL
FERRITIN SERPL-MCNC: 41 NG/ML
FOLATE SERPL-MCNC: 7.4 NG/ML
GLUCOSE SERPL-MCNC: 89 MG/DL
HBA1C MFR BLD HPLC: 5.1 %
HCT VFR BLD CALC: 46.3 %
HDLC SERPL-MCNC: 36 MG/DL
HGB BLD-MCNC: 15.6 G/DL
IMM GRANULOCYTES NFR BLD AUTO: 0.1 %
IRON SATN MFR SERPL: 37 %
IRON SERPL-MCNC: 113 UG/DL
LDLC SERPL CALC-MCNC: 66 MG/DL
LYMPHOCYTES # BLD AUTO: 1.18 K/UL
LYMPHOCYTES NFR BLD AUTO: 16.3 %
MAGNESIUM SERPL-MCNC: 2 MG/DL
MAN DIFF?: NORMAL
MCHC RBC-ENTMCNC: 32.8 PG
MCHC RBC-ENTMCNC: 33.7 GM/DL
MCV RBC AUTO: 97.5 FL
MONOCYTES # BLD AUTO: 0.6 K/UL
MONOCYTES NFR BLD AUTO: 8.3 %
NEUTROPHILS # BLD AUTO: 5.17 K/UL
NEUTROPHILS NFR BLD AUTO: 71.6 %
NONHDLC SERPL-MCNC: 85 MG/DL
PARATHYROID HORMONE INTACT: 69 PG/ML
PHOSPHATE SERPL-MCNC: 3.4 MG/DL
PLATELET # BLD AUTO: 278 K/UL
POTASSIUM SERPL-SCNC: 4.9 MMOL/L
PROT SERPL-MCNC: 6.6 G/DL
PSA SERPL-MCNC: 1.24 NG/ML
RBC # BLD: 4.75 M/UL
RBC # FLD: 12.3 %
SODIUM SERPL-SCNC: 142 MMOL/L
T3FREE SERPL-MCNC: 2.8 PG/ML
T4 FREE SERPL-MCNC: 1.2 NG/DL
TIBC SERPL-MCNC: 308 UG/DL
TRIGL SERPL-MCNC: 96 MG/DL
TSH SERPL-ACNC: 2.48 UIU/ML
UIBC SERPL-MCNC: 195 UG/DL
VIT B12 SERPL-MCNC: 366 PG/ML
WBC # FLD AUTO: 7.23 K/UL

## 2022-08-19 LAB — ZINC SERPL-MCNC: 86 UG/DL

## 2022-08-20 LAB — VIT B1 SERPL-MCNC: 167.9 NMOL/L

## 2022-08-22 ENCOUNTER — TRANSCRIPTION ENCOUNTER (OUTPATIENT)
Age: 57
End: 2022-08-22

## 2022-08-22 DIAGNOSIS — E21.3 HYPERPARATHYROIDISM, UNSPECIFIED: ICD-10-CM

## 2022-08-22 LAB
VIT A SERPL-MCNC: 50.9 UG/DL
VIT B6 SERPL-MCNC: 18.4 UG/L

## 2022-08-24 ENCOUNTER — OUTPATIENT (OUTPATIENT)
Dept: OUTPATIENT SERVICES | Facility: HOSPITAL | Age: 57
LOS: 1 days | End: 2022-08-24
Payer: COMMERCIAL

## 2022-08-24 ENCOUNTER — RESULT REVIEW (OUTPATIENT)
Age: 57
End: 2022-08-24

## 2022-08-24 ENCOUNTER — APPOINTMENT (OUTPATIENT)
Dept: ULTRASOUND IMAGING | Facility: CLINIC | Age: 57
End: 2022-08-24

## 2022-08-24 DIAGNOSIS — Z98.84 BARIATRIC SURGERY STATUS: Chronic | ICD-10-CM

## 2022-08-24 DIAGNOSIS — Z95.5 PRESENCE OF CORONARY ANGIOPLASTY IMPLANT AND GRAFT: Chronic | ICD-10-CM

## 2022-08-24 DIAGNOSIS — Z98.890 OTHER SPECIFIED POSTPROCEDURAL STATES: Chronic | ICD-10-CM

## 2022-08-24 DIAGNOSIS — Z00.8 ENCOUNTER FOR OTHER GENERAL EXAMINATION: ICD-10-CM

## 2022-08-24 DIAGNOSIS — E21.3 HYPERPARATHYROIDISM, UNSPECIFIED: ICD-10-CM

## 2022-08-24 PROCEDURE — 76536 US EXAM OF HEAD AND NECK: CPT

## 2022-08-24 PROCEDURE — 76536 US EXAM OF HEAD AND NECK: CPT | Mod: 26

## 2022-08-30 ENCOUNTER — TRANSCRIPTION ENCOUNTER (OUTPATIENT)
Age: 57
End: 2022-08-30

## 2022-08-31 ENCOUNTER — TRANSCRIPTION ENCOUNTER (OUTPATIENT)
Age: 57
End: 2022-08-31

## 2022-08-31 RX ORDER — ESCITALOPRAM OXALATE 10 MG/1
10 TABLET ORAL DAILY
Qty: 90 | Refills: 3 | Status: DISCONTINUED | COMMUNITY
Start: 2021-09-03 | End: 2022-08-31

## 2022-09-01 ENCOUNTER — NON-APPOINTMENT (OUTPATIENT)
Age: 57
End: 2022-09-01

## 2022-09-02 ENCOUNTER — TRANSCRIPTION ENCOUNTER (OUTPATIENT)
Age: 57
End: 2022-09-02

## 2022-09-02 DIAGNOSIS — E34.9 ENDOCRINE DISORDER, UNSPECIFIED: ICD-10-CM

## 2022-09-06 ENCOUNTER — TRANSCRIPTION ENCOUNTER (OUTPATIENT)
Age: 57
End: 2022-09-06

## 2022-09-07 ENCOUNTER — NON-APPOINTMENT (OUTPATIENT)
Age: 57
End: 2022-09-07

## 2022-09-10 ENCOUNTER — APPOINTMENT (OUTPATIENT)
Dept: ULTRASOUND IMAGING | Facility: CLINIC | Age: 57
End: 2022-09-10

## 2022-09-10 PROCEDURE — 76775 US EXAM ABDO BACK WALL LIM: CPT

## 2022-09-12 ENCOUNTER — TRANSCRIPTION ENCOUNTER (OUTPATIENT)
Age: 57
End: 2022-09-12

## 2022-09-13 ENCOUNTER — TRANSCRIPTION ENCOUNTER (OUTPATIENT)
Age: 57
End: 2022-09-13

## 2022-09-16 ENCOUNTER — EMERGENCY (EMERGENCY)
Facility: HOSPITAL | Age: 57
LOS: 0 days | Discharge: HOME | End: 2022-09-16
Attending: EMERGENCY MEDICINE
Payer: COMMERCIAL

## 2022-09-16 VITALS — WEIGHT: 216.93 LBS | HEIGHT: 70 IN

## 2022-09-16 VITALS
OXYGEN SATURATION: 100 % | DIASTOLIC BLOOD PRESSURE: 95 MMHG | SYSTOLIC BLOOD PRESSURE: 130 MMHG | TEMPERATURE: 98 F | RESPIRATION RATE: 18 BRPM | HEART RATE: 70 BPM

## 2022-09-16 DIAGNOSIS — Z95.5 PRESENCE OF CORONARY ANGIOPLASTY IMPLANT AND GRAFT: Chronic | ICD-10-CM

## 2022-09-16 DIAGNOSIS — Z95.5 PRESENCE OF CORONARY ANGIOPLASTY IMPLANT AND GRAFT: ICD-10-CM

## 2022-09-16 DIAGNOSIS — Z98.890 OTHER SPECIFIED POSTPROCEDURAL STATES: Chronic | ICD-10-CM

## 2022-09-16 DIAGNOSIS — I25.2 OLD MYOCARDIAL INFARCTION: ICD-10-CM

## 2022-09-16 DIAGNOSIS — Z98.84 BARIATRIC SURGERY STATUS: Chronic | ICD-10-CM

## 2022-09-16 DIAGNOSIS — G62.9 POLYNEUROPATHY, UNSPECIFIED: ICD-10-CM

## 2022-09-16 DIAGNOSIS — R07.89 OTHER CHEST PAIN: ICD-10-CM

## 2022-09-16 DIAGNOSIS — K21.9 GASTRO-ESOPHAGEAL REFLUX DISEASE WITHOUT ESOPHAGITIS: ICD-10-CM

## 2022-09-16 DIAGNOSIS — Z79.02 LONG TERM (CURRENT) USE OF ANTITHROMBOTICS/ANTIPLATELETS: ICD-10-CM

## 2022-09-16 DIAGNOSIS — E78.5 HYPERLIPIDEMIA, UNSPECIFIED: ICD-10-CM

## 2022-09-16 DIAGNOSIS — I10 ESSENTIAL (PRIMARY) HYPERTENSION: ICD-10-CM

## 2022-09-16 DIAGNOSIS — I25.10 ATHEROSCLEROTIC HEART DISEASE OF NATIVE CORONARY ARTERY WITHOUT ANGINA PECTORIS: ICD-10-CM

## 2022-09-16 DIAGNOSIS — Z79.82 LONG TERM (CURRENT) USE OF ASPIRIN: ICD-10-CM

## 2022-09-16 LAB
ALBUMIN SERPL ELPH-MCNC: 4.3 G/DL — SIGNIFICANT CHANGE UP (ref 3.3–5)
ALP SERPL-CCNC: 97 U/L — SIGNIFICANT CHANGE UP (ref 40–120)
ALT FLD-CCNC: 24 U/L — SIGNIFICANT CHANGE UP (ref 12–78)
ANION GAP SERPL CALC-SCNC: 3 MMOL/L — LOW (ref 5–17)
APPEARANCE UR: CLEAR — SIGNIFICANT CHANGE UP
AST SERPL-CCNC: 24 U/L — SIGNIFICANT CHANGE UP (ref 15–37)
BASOPHILS # BLD AUTO: 0.03 K/UL — SIGNIFICANT CHANGE UP (ref 0–0.2)
BASOPHILS NFR BLD AUTO: 0.4 % — SIGNIFICANT CHANGE UP (ref 0–2)
BILIRUB SERPL-MCNC: 1.5 MG/DL — HIGH (ref 0.2–1.2)
BILIRUB UR-MCNC: NEGATIVE — SIGNIFICANT CHANGE UP
BUN SERPL-MCNC: 26 MG/DL — HIGH (ref 7–23)
CALCIUM SERPL-MCNC: 9.3 MG/DL — SIGNIFICANT CHANGE UP (ref 8.5–10.1)
CHLORIDE SERPL-SCNC: 106 MMOL/L — SIGNIFICANT CHANGE UP (ref 96–108)
CO2 SERPL-SCNC: 30 MMOL/L — SIGNIFICANT CHANGE UP (ref 22–31)
COLOR SPEC: YELLOW — SIGNIFICANT CHANGE UP
CREAT SERPL-MCNC: 1.18 MG/DL — SIGNIFICANT CHANGE UP (ref 0.5–1.3)
D DIMER BLD IA.RAPID-MCNC: <150 NG/ML DDU — SIGNIFICANT CHANGE UP
DIFF PNL FLD: NEGATIVE — SIGNIFICANT CHANGE UP
EGFR: 72 ML/MIN/1.73M2 — SIGNIFICANT CHANGE UP
EOSINOPHIL # BLD AUTO: 0.14 K/UL — SIGNIFICANT CHANGE UP (ref 0–0.5)
EOSINOPHIL NFR BLD AUTO: 1.7 % — SIGNIFICANT CHANGE UP (ref 0–6)
GLUCOSE SERPL-MCNC: 77 MG/DL — SIGNIFICANT CHANGE UP (ref 70–99)
GLUCOSE UR QL: NEGATIVE — SIGNIFICANT CHANGE UP
HCT VFR BLD CALC: 42.6 % — SIGNIFICANT CHANGE UP (ref 39–50)
HGB BLD-MCNC: 15.3 G/DL — SIGNIFICANT CHANGE UP (ref 13–17)
IMM GRANULOCYTES NFR BLD AUTO: 0.2 % — SIGNIFICANT CHANGE UP (ref 0–0.9)
KETONES UR-MCNC: NEGATIVE — SIGNIFICANT CHANGE UP
LEUKOCYTE ESTERASE UR-ACNC: NEGATIVE — SIGNIFICANT CHANGE UP
LYMPHOCYTES # BLD AUTO: 1.01 K/UL — SIGNIFICANT CHANGE UP (ref 1–3.3)
LYMPHOCYTES # BLD AUTO: 12.2 % — LOW (ref 13–44)
MCHC RBC-ENTMCNC: 33.7 PG — SIGNIFICANT CHANGE UP (ref 27–34)
MCHC RBC-ENTMCNC: 35.9 GM/DL — SIGNIFICANT CHANGE UP (ref 32–36)
MCV RBC AUTO: 93.8 FL — SIGNIFICANT CHANGE UP (ref 80–100)
MONOCYTES # BLD AUTO: 0.74 K/UL — SIGNIFICANT CHANGE UP (ref 0–0.9)
MONOCYTES NFR BLD AUTO: 8.9 % — SIGNIFICANT CHANGE UP (ref 2–14)
NEUTROPHILS # BLD AUTO: 6.34 K/UL — SIGNIFICANT CHANGE UP (ref 1.8–7.4)
NEUTROPHILS NFR BLD AUTO: 76.6 % — SIGNIFICANT CHANGE UP (ref 43–77)
NITRITE UR-MCNC: NEGATIVE — SIGNIFICANT CHANGE UP
NT-PROBNP SERPL-SCNC: 467 PG/ML — HIGH (ref 0–125)
PH UR: 5 — SIGNIFICANT CHANGE UP (ref 5–8)
PLATELET # BLD AUTO: 272 K/UL — SIGNIFICANT CHANGE UP (ref 150–400)
POTASSIUM SERPL-MCNC: 3.9 MMOL/L — SIGNIFICANT CHANGE UP (ref 3.5–5.3)
POTASSIUM SERPL-SCNC: 3.9 MMOL/L — SIGNIFICANT CHANGE UP (ref 3.5–5.3)
PROT SERPL-MCNC: 7.2 GM/DL — SIGNIFICANT CHANGE UP (ref 6–8.3)
PROT UR-MCNC: 30 MG/DL
RBC # BLD: 4.54 M/UL — SIGNIFICANT CHANGE UP (ref 4.2–5.8)
RBC # FLD: 12 % — SIGNIFICANT CHANGE UP (ref 10.3–14.5)
SODIUM SERPL-SCNC: 139 MMOL/L — SIGNIFICANT CHANGE UP (ref 135–145)
SP GR SPEC: 1.02 — SIGNIFICANT CHANGE UP (ref 1.01–1.02)
TROPONIN I, HIGH SENSITIVITY RESULT: 17.99 NG/L — SIGNIFICANT CHANGE UP
TROPONIN I, HIGH SENSITIVITY RESULT: 34.36 NG/L — SIGNIFICANT CHANGE UP
UROBILINOGEN FLD QL: 1
WBC # BLD: 8.28 K/UL — SIGNIFICANT CHANGE UP (ref 3.8–10.5)
WBC # FLD AUTO: 8.28 K/UL — SIGNIFICANT CHANGE UP (ref 3.8–10.5)

## 2022-09-16 PROCEDURE — 93010 ELECTROCARDIOGRAM REPORT: CPT

## 2022-09-16 PROCEDURE — 81001 URINALYSIS AUTO W/SCOPE: CPT

## 2022-09-16 PROCEDURE — 85025 COMPLETE CBC W/AUTO DIFF WBC: CPT

## 2022-09-16 PROCEDURE — 99285 EMERGENCY DEPT VISIT HI MDM: CPT

## 2022-09-16 PROCEDURE — 80053 COMPREHEN METABOLIC PANEL: CPT

## 2022-09-16 PROCEDURE — 84484 ASSAY OF TROPONIN QUANT: CPT

## 2022-09-16 PROCEDURE — 36415 COLL VENOUS BLD VENIPUNCTURE: CPT

## 2022-09-16 PROCEDURE — 93005 ELECTROCARDIOGRAM TRACING: CPT

## 2022-09-16 PROCEDURE — 99285 EMERGENCY DEPT VISIT HI MDM: CPT | Mod: 25

## 2022-09-16 PROCEDURE — 71045 X-RAY EXAM CHEST 1 VIEW: CPT | Mod: 26

## 2022-09-16 PROCEDURE — 83880 ASSAY OF NATRIURETIC PEPTIDE: CPT

## 2022-09-16 PROCEDURE — 71045 X-RAY EXAM CHEST 1 VIEW: CPT

## 2022-09-16 PROCEDURE — 85379 FIBRIN DEGRADATION QUANT: CPT

## 2022-09-16 NOTE — ED PROVIDER NOTE - PATIENT PORTAL LINK FT
You can access the FollowMyHealth Patient Portal offered by [facility] by registering at the following website: [email or web address]. By joining [facility]’s FollowMyHealth portal, you will also be able to view your health information using other applications (apps) compatible with our system.

## 2022-09-16 NOTE — ED ADULT NURSE NOTE - CHIEF COMPLAINT QUOTE
PT C/O CHEST PAIN WITH TINGLING SENSATION TO LEFT FINGERS STARTED THIS MORNING, LASTED 30 MIN.  PT CURRENTLY HAS B/L KIDNEY STONES.  PMH MI WITH CARDIAC STENTS.  PT TOOK BABY ASPIRIN PTA.  RECENT TRAVEL TO [facility].  REQUESTED EKG UPON ARRIVAL TO ED

## 2022-09-16 NOTE — ED ADULT NURSE NOTE - OBJECTIVE STATEMENT
Pt brought into the ER by wife this morning for chest pain and tingling in the fingers in the L hand starting at 6:40 AM lasting for approximately 1 hour, pt states that he then had a heaviness in the chest that now is gone. Pt with hx of heart attack in 2014. Pt currently has kidney stones that is creating some discomfort in the flank area, that he has been able to manage at home. Pt calm, and cooperative resting on stretcher in room

## 2022-09-16 NOTE — ED PROVIDER NOTE - CARE PLAN
1 Principal Discharge DX:	Chronic chest pain with low to moderate risk for coronary artery disease   Principal Discharge DX:	Chest pain with low risk of acute coronary syndrome

## 2022-09-16 NOTE — ED PROVIDER NOTE - PRINCIPAL DIAGNOSIS
Chronic chest pain with low to moderate risk for coronary artery disease Chest pain with low risk of acute coronary syndrome

## 2022-09-16 NOTE — ED ADULT TRIAGE NOTE - CHIEF COMPLAINT QUOTE
PT C/O CHEST PAIN WITH TINGLING SENSATION TO LEFT FINGERS STARTED THIS MORNING, LASTED 30 MIN.  PT CURRENTLY HAS B/L KIDNEY STONES.  PMH MI WITH CARDIAC STENTS.  PT TOOK BABY ASPIRIN PTA.  RECENT TRAVEL TO [facility].  REQUESTED EKG UPON ARRIVAL TO ED

## 2022-09-16 NOTE — ED PROVIDER NOTE - CLINICAL SUMMARY MEDICAL DECISION MAKING FREE TEXT BOX
58 y/o male with a PMHx of CAD, GERD, heart attack s/p stents, HTN, HLD, kidney stones, idiopathic neuropathy, renal calculus presents ambulatory to the ED regarding CP episode. Pt reports left anterior chest heaviness x2 hours with diaphoresis. Self resolved after ED arrival. Pt currently asymptomatic. Exam unremarkable. Plan: EKG, chest XR, labs including ddimer, BNP, troponin x2, monitor, observe, reassess, discuss with Dr. Long. 56 y/o male with a PMHx of CAD, GERD, heart attack s/p stents, HTN, HLD, kidney stones, idiopathic neuropathy, renal calculus presents ambulatory to the ED regarding CP episode. Pt reports left anterior chest heaviness x2 hours with diaphoresis. Self resolved after ED arrival. Pt currently asymptomatic. Exam unremarkable.   Plan: EKG, chest XR, labs including d-dimer, BNP, troponin x2, monitor, observe, reassess, discuss with Dr. Long/Cardio.

## 2022-09-16 NOTE — ED PROVIDER NOTE - PROGRESS NOTE DETAILS
Carrie Lutz for attending Dr. Nava: Case discussed with pt's cardiologist, Dr. Long. Advises troponin x2 and will come to ED to see pt. JESSICA Nava MD:  Pt was evaluated at bedside earlier today by Dr. Long.  2nd troponin > 1st but still within normal range: results d/w Dr. Long, who advises pt D/C home for office f/u next week.  Pt agreeable with management plan. JESSICA Nava MD:  Pt was evaluated at bedside earlier today by Dr. Long.  2nd troponin > 1st but still within normal range: results d/w Dr. Long, who advises pt D/C home for office f/u next week.  Pt agreeable with management plan.  Pt has remained cp-free while in ED.

## 2022-09-16 NOTE — ED PROVIDER NOTE - CONSTITUTIONAL, MLM
White male adult, nontoxic, well appearing, awake, alert, oriented to person, place, time/situation and in no apparent distress. normal...

## 2022-09-16 NOTE — ED PROVIDER NOTE - MUSCULOSKELETAL, MLM
Spine appears normal, range of motion is not limited, no muscle or joint tenderness. MAEx4. No focal swelling tenderness. Spine appears normal, range of motion is not limited, no muscle or joint tenderness. MAEx4. No focal swelling nor tenderness.

## 2022-09-16 NOTE — ED PROVIDER NOTE - CARE PROVIDER_API CALL
Branden Long (MD)  Cardiovascular Disease; Interventional Cardiology  [address] [place] [address]  Phone: [phone]  Fax: [phone]  Follow Up Time:

## 2022-09-16 NOTE — ED PROVIDER NOTE - WR INTERPRETATION DATE TIME  1
Pt presents for Taxotere and Cytoxan offering no complaints  Pt tolerated Taxotere and premed well  After starting Cytoxan, pt reported discomfort at IV site  No other s/s of infiltration/extravasation present  IV removed  New IV placed in left AC  Tolerated rest of tx well  AVS declined  Next appointment reviewed  16-Sep-2022 15:27

## 2022-09-16 NOTE — ED PROVIDER NOTE - OBJECTIVE STATEMENT
56 y/o male with a PMHx of CAD, GERD, heart attack s/p stents, HTN, HLD, kidney stones, idiopathic neuropathy, renal calculus presents to the ED for evaluation. Pt states this morning he was getting ready for work and around 6:30am he developed tingling in his fingers and left sided chest heaviness. Pt went to work, his coworkers noticed he was pale and diaphoretic and came to the ED for evaluation. CP nonpleuritic, nonradiating and currently resolved. On 81mg ASA. Denies SOB, nausea, palpitations, LE pain/swelling. Pt saw his cardiologist 4 days ago and is due to have a stress test. Pt started Wellbutrin 2 weeks ago. NKDA. Recent travel to [facility]. PCP: Dr. Levin. Cardio: Dr. Long. 56 y/o male with a PMHx of CAD, GERD, heart attack s/p stents, HTN, HLD, kidney stones, idiopathic neuropathy, renal calculus presents to the ED for evaluation. Pt states this morning he was getting ready for work and around 6:30am he developed tingling in his fingers and left sided chest heaviness. Pt went to work, his coworkers noticed he was pale and diaphoretic and came to the ED for evaluation. CP non-pleuritic, non-radiating and currently resolved. On 81mg ASA. Denies SOB, nausea, palpitations, LE pain/swelling. Pt saw his cardiologist 4 days ago and is due to have a stress test. Pt started Wellbutrin 2 weeks ago.   NKDA. Recent travel to [facility]. PCP: Dr. Levin. Cardio: Dr. Long.

## 2022-09-16 NOTE — ED PROVIDER NOTE - CARDIAC, MLM
Normal rate, regular rhythm.  Heart sounds S1, S2.  No murmurs, rubs or gallops. Normal rate, regular rhythm.  Heart sounds S1, S2.  No murmurs, rubs or gallops. Normal radial pulse

## 2022-09-16 NOTE — ED PROVIDER NOTE - NSFOLLOWUPINSTRUCTIONS_ED_ALL_ED_FT
Continue your regular medications as per routine.  You may check BP reading 2 - 3 x a day, but no more frequently.  Follow up early next week with Dr. Long.  Call Cardiology office if chest pain recurs/worsens.      Chest Pain    Chest pain can be caused by many different conditions which may or may not be dangerous. Causes include heartburn, lung infections, heart attack, blood clot in lungs, skin infections, strain or damage to muscle, cartilage, or bones, etc. In addition to a history and physical examination, an electrocardiogram (ECG) or other lab tests may have been performed to determine the cause of your chest pain. Follow up with your primary care provider or with a cardiologist as instructed.     SEEK IMMEDIATE MEDICAL CARE IF YOU HAVE ANY OF THE FOLLOWING SYMPTOMS: worsening chest pain, coughing up blood, unexplained back/neck/jaw pain, severe abdominal pain, dizziness or lightheadedness, fainting, shortness of breath, sweaty or clammy skin, vomiting, or racing heart beat. These symptoms may represent a serious problem that is an emergency. Do not wait to see if the symptoms will go away. Get medical help right away. Call 911 and do not drive yourself to the hospital.

## 2022-09-21 ENCOUNTER — RX RENEWAL (OUTPATIENT)
Age: 57
End: 2022-09-21

## 2022-09-22 ENCOUNTER — APPOINTMENT (OUTPATIENT)
Dept: UROLOGY | Facility: CLINIC | Age: 57
End: 2022-09-22

## 2022-09-22 ENCOUNTER — RX RENEWAL (OUTPATIENT)
Age: 57
End: 2022-09-22

## 2022-09-22 VITALS
SYSTOLIC BLOOD PRESSURE: 111 MMHG | HEIGHT: 70 IN | OXYGEN SATURATION: 97 % | HEART RATE: 66 BPM | WEIGHT: 211 LBS | BODY MASS INDEX: 30.21 KG/M2 | DIASTOLIC BLOOD PRESSURE: 74 MMHG

## 2022-09-22 PROCEDURE — 99213 OFFICE O/P EST LOW 20 MIN: CPT

## 2022-09-22 NOTE — HISTORY OF PRESENT ILLNESS
[FreeTextEntry1] : 54M hx of multiple kidney stones seen 10/10/19 with c/o gross hematuria. This began last week. He had similar browish color urine 1 month ago but resolved spontaneously after hydration. He denies the sensation of passing a stone in that period. Pt had a UCx 3 days ago was negative. Symtoms are mild as they have spontaneously resolved. Nothing makes them better, nothing makes them worse. They are associated with nothing. \par Denies hx of smoking or personal cancer hx. \par \par 11/26/2019: Patient presents for follow up s/p cystoscopy, right URS, right lithotripsy 11/15/19. Patient reports he had some hematuria post op which resolved and he restarted his Plavix post op day 1 and started to have gross hematuria 5 days ago for which he saw his PCP yesterday had a UA and was told to hold his Plavix and was given Rx for  Cipro 250 mg BID x 5 days. Patient states he held his Plavix and currently has no hematuria and is voiding yellow urine. Patient states his associated symptoms include intermittent right flank pain that is "pulsating" in nature. He denies any dysuria. No frequency, no urgency, no hesitancy, no straining. No incontinence. No fevers, no chills, no nausea, no vomiting.\par \par 09/22/2022: Patient presents for follow up. He reports intermitent bilateral flank pain. He had RBUS, which showed  10 mm RIGHT renal stone, 8 mm LEFT renal stone. No hydronephrosis.

## 2022-09-22 NOTE — REVIEW OF SYSTEMS
[see HPI] : see HPI [Negative] : Heme/Lymph [Dysuria] : no dysuria [Incontinence] : no incontinence [Hesitancy] : no urinary hesitancy [Nocturia] : no nocturia [Genital Lesion] : no genital lesions [Testicular Pain] : no testicular pain

## 2022-09-22 NOTE — ASSESSMENT
[FreeTextEntry1] : 58 yo M with bilateral renal stones. Discussed that non-obstructing stones are not expected to cause pain. WIll send for CT to assess for ureteral stones. Will book for bilateral URS LLT.

## 2022-09-23 ENCOUNTER — RESULT REVIEW (OUTPATIENT)
Age: 57
End: 2022-09-23

## 2022-09-27 ENCOUNTER — NON-APPOINTMENT (OUTPATIENT)
Age: 57
End: 2022-09-27

## 2022-09-27 ENCOUNTER — RX CHANGE (OUTPATIENT)
Age: 57
End: 2022-09-27

## 2022-09-28 ENCOUNTER — FORM ENCOUNTER (OUTPATIENT)
Age: 57
End: 2022-09-28

## 2022-09-28 ENCOUNTER — OUTPATIENT (OUTPATIENT)
Dept: OUTPATIENT SERVICES | Facility: HOSPITAL | Age: 57
LOS: 1 days | End: 2022-09-28
Payer: COMMERCIAL

## 2022-09-28 ENCOUNTER — APPOINTMENT (OUTPATIENT)
Dept: CT IMAGING | Facility: CLINIC | Age: 57
End: 2022-09-28

## 2022-09-28 DIAGNOSIS — Z98.84 BARIATRIC SURGERY STATUS: Chronic | ICD-10-CM

## 2022-09-28 DIAGNOSIS — Z95.5 PRESENCE OF CORONARY ANGIOPLASTY IMPLANT AND GRAFT: Chronic | ICD-10-CM

## 2022-09-28 DIAGNOSIS — Z98.890 OTHER SPECIFIED POSTPROCEDURAL STATES: Chronic | ICD-10-CM

## 2022-09-28 DIAGNOSIS — N20.0 CALCULUS OF KIDNEY: ICD-10-CM

## 2022-09-28 PROCEDURE — 74176 CT ABD & PELVIS W/O CONTRAST: CPT

## 2022-09-28 PROCEDURE — 74176 CT ABD & PELVIS W/O CONTRAST: CPT | Mod: 26

## 2022-10-14 ENCOUNTER — APPOINTMENT (OUTPATIENT)
Dept: FAMILY MEDICINE | Facility: CLINIC | Age: 57
End: 2022-10-14

## 2022-10-25 ENCOUNTER — APPOINTMENT (OUTPATIENT)
Dept: UROLOGY | Facility: CLINIC | Age: 57
End: 2022-10-25

## 2022-10-25 DIAGNOSIS — N20.0 CALCULUS OF KIDNEY: ICD-10-CM

## 2022-10-25 PROCEDURE — 99213 OFFICE O/P EST LOW 20 MIN: CPT

## 2022-10-25 NOTE — ASSESSMENT
[FreeTextEntry1] : 56 yo M with bilateral renal stones on RBUS. None seen on CT. Either passed or RBUS was error.\par For kidney stones, recommended metabolic work up. WIll send for blood work for serum electrolytes, uric acid, PTH, TSH and Cr for metabolic work up. Patient was instructed to perform 24 hour urine collection to assay urine electrolytes. This would allow us to discuss lifestyle, dietary, or pharmacologic interventions to reduce stone recurrence risk. For now, patient was instructed to increase fluid intake to a urine output of 2L daily, to add lemon juice to the water as a source of citrate, and to reduce sodium intake. All of the patient's questions and concerns addressed. \par - 24 hour urine collection and blood work for metabolic work up\par - RTC 6 weeks

## 2022-10-25 NOTE — HISTORY OF PRESENT ILLNESS
[FreeTextEntry1] : 54M hx of multiple kidney stones seen 10/10/19 with c/o gross hematuria. This began last week. He had similar browish color urine 1 month ago but resolved spontaneously after hydration. He denies the sensation of passing a stone in that period. Pt had a UCx 3 days ago was negative. Symtoms are mild as they have spontaneously resolved. Nothing makes them better, nothing makes them worse. They are associated with nothing. \par Denies hx of smoking or personal cancer hx. \par \par 11/26/2019: Patient presents for follow up s/p cystoscopy, right URS, right lithotripsy 11/15/19. Patient reports he had some hematuria post op which resolved and he restarted his Plavix post op day 1 and started to have gross hematuria 5 days ago for which he saw his PCP yesterday had a UA and was told to hold his Plavix and was given Rx for  Cipro 250 mg BID x 5 days. Patient states he held his Plavix and currently has no hematuria and is voiding yellow urine. Patient states his associated symptoms include intermittent right flank pain that is "pulsating" in nature. He denies any dysuria. No frequency, no urgency, no hesitancy, no straining. No incontinence. No fevers, no chills, no nausea, no vomiting.\par \par 09/22/2022: Patient presents for follow up. He reports intermitent bilateral flank pain. He had RBUS, which showed  10 mm RIGHT renal stone, 8 mm LEFT renal stone. No hydronephrosis. \par \par 10/25/2022: Patient presents for follow up. He reports feeling like he is passing tiny crystals. CT showed no significant stone burden so URS was cancelled. No acute  complaints.

## 2022-10-28 ENCOUNTER — APPOINTMENT (OUTPATIENT)
Dept: UROLOGY | Facility: HOSPITAL | Age: 57
End: 2022-10-28

## 2022-11-03 ENCOUNTER — APPOINTMENT (OUTPATIENT)
Dept: UROLOGY | Facility: CLINIC | Age: 57
End: 2022-11-03

## 2022-11-14 NOTE — END OF VISIT
Chart(s) [Time Spent: ___ minutes] : I have spent [unfilled] minutes of time on the encounter. [>50% of the face to face encounter time was spent on counseling and/or coordination of care for ___] : Greater than 50% of the face to face encounter time was spent on counseling and/or coordination of care for [unfilled]

## 2022-11-28 ENCOUNTER — APPOINTMENT (OUTPATIENT)
Dept: DERMATOLOGY | Facility: CLINIC | Age: 57
End: 2022-11-28

## 2022-11-28 DIAGNOSIS — Z12.83 ENCOUNTER FOR SCREENING FOR MALIGNANT NEOPLASM OF SKIN: ICD-10-CM

## 2022-11-28 PROCEDURE — 99213 OFFICE O/P EST LOW 20 MIN: CPT

## 2022-11-28 NOTE — HISTORY OF PRESENT ILLNESS
[FreeTextEntry1] : skin check [de-identified] : 57 year old male here for skin check. no tx tried.

## 2022-12-06 ENCOUNTER — NON-APPOINTMENT (OUTPATIENT)
Age: 57
End: 2022-12-06

## 2022-12-08 ENCOUNTER — APPOINTMENT (OUTPATIENT)
Dept: UROLOGY | Facility: CLINIC | Age: 57
End: 2022-12-08

## 2022-12-08 DIAGNOSIS — N20.0 CALCULUS OF KIDNEY: ICD-10-CM

## 2022-12-08 PROCEDURE — 99213 OFFICE O/P EST LOW 20 MIN: CPT

## 2022-12-08 NOTE — HISTORY OF PRESENT ILLNESS
[FreeTextEntry1] : 54M hx of multiple kidney stones seen 10/10/19 with c/o gross hematuria. This began last week. He had similar browish color urine 1 month ago but resolved spontaneously after hydration. He denies the sensation of passing a stone in that period. Pt had a UCx 3 days ago was negative. Symtoms are mild as they have spontaneously resolved. Nothing makes them better, nothing makes them worse. They are associated with nothing. \par Denies hx of smoking or personal cancer hx. \par \par 11/26/2019: Patient presents for follow up s/p cystoscopy, right URS, right lithotripsy 11/15/19. Patient reports he had some hematuria post op which resolved and he restarted his Plavix post op day 1 and started to have gross hematuria 5 days ago for which he saw his PCP yesterday had a UA and was told to hold his Plavix and was given Rx for  Cipro 250 mg BID x 5 days. Patient states he held his Plavix and currently has no hematuria and is voiding yellow urine. Patient states his associated symptoms include intermittent right flank pain that is "pulsating" in nature. He denies any dysuria. No frequency, no urgency, no hesitancy, no straining. No incontinence. No fevers, no chills, no nausea, no vomiting.\par \par 09/22/2022: Patient presents for follow up. He reports intermitent bilateral flank pain. He had RBUS, which showed  10 mm RIGHT renal stone, 8 mm LEFT renal stone. No hydronephrosis. \par \par 10/25/2022: Patient presents for follow up. He reports feeling like he is passing tiny crystals. CT showed no significant stone burden so URS was cancelled. No acute  complaints.\par \par 12/08/2022: Patient presents for follow up. He denies any  complaints. Here to discuss 24 hour urine collection, which showed pH 5.5 and elevated SS uric acid. Urine output 1.3 L/day.

## 2022-12-08 NOTE — ASSESSMENT
[FreeTextEntry1] : 56 yo M with h/o renal stones. Recommended moonstone to alkalinize urine. Hydrate to generate 2.5L uo/day. WIll recheck 24 hr urine collection and RBUS in 6 months.

## 2022-12-08 NOTE — REVIEW OF SYSTEMS
[see HPI] : see HPI [Dysuria] : no dysuria [Incontinence] : no incontinence [Hesitancy] : no urinary hesitancy [Nocturia] : no nocturia [Genital Lesion] : no genital lesions [Testicular Pain] : no testicular pain [Negative] : Heme/Lymph

## 2023-01-09 ENCOUNTER — TRANSCRIPTION ENCOUNTER (OUTPATIENT)
Age: 58
End: 2023-01-09

## 2023-01-18 ENCOUNTER — TRANSCRIPTION ENCOUNTER (OUTPATIENT)
Age: 58
End: 2023-01-18

## 2023-01-18 RX ORDER — BUPROPION HYDROCHLORIDE 150 MG/1
150 TABLET, EXTENDED RELEASE ORAL DAILY
Qty: 90 | Refills: 3 | Status: DISCONTINUED | COMMUNITY
Start: 2022-08-30 | End: 2023-01-18

## 2023-02-06 NOTE — H&P PST ADULT - ENDOCRINE
"[facility] [[id number]]  Chief Complaint   Patient presents with     RECHECK     2 month follow up     Initial /71   Pulse 79   Ht 4' 10.9\" (149.6 cm)   Wt 144 lb 10 oz (65.6 kg)   BMI 29.31 kg/m   Estimated body mass index is 29.31 kg/m  as calculated from the following:    Height as of this encounter: 4' 10.9\" (149.6 cm).    Weight as of this encounter: 144 lb 10 oz (65.6 kg).  Medication Reconciliation: complete    Does the patient need any medication refills today? No    Does the patient/parent need MyChart or Proxy acces today? No    Would you like a flu shot today? No    Would you like the Covid vaccine today? No    Wt Readings from Last 4 Encounters:   23 144 lb 10 oz (65.6 kg) (>99 %, Z= 3.10)*   22 147 lb 4.3 oz (66.8 kg) (>99 %, Z= 3.20)*   10/19/22 135 lb (61.2 kg) (>99 %, Z= 3.04)*   08/10/22 136 lb 11 oz (62 kg) (>99 %, Z= 3.13)*     * Growth percentiles are based on CDC (Girls, 2-20 Years) data.     Peds Outpatient BP  1) Rested for 5 minutes, BP taken on bare arm, patient sitting (or supine for infants) w/ legs uncrossed?   Yes  2) Right arm used?      No - PICC or IV line in right arm  3) Arm circumference of largest part of upper arm (in cm): 29  4) BP cuff sized used: Adult (25-32cm)   If used different size cuff then what was recommended why? N/A  5) First BP reading:machine   BP Readings from Last 1 Encounters:   23 120/71 (95 %, Z = 1.64 /  85 %, Z = 1.04)*     *BP percentiles are based on the 2017 AAP Clinical Practice Guideline for girls      Is reading >90%?Yes   (90% for <1 years is 90/50)  (90% for >18 years is 140/90)  *If a machine BP is at or above 90% take manual BP  6) Manual BP readin/78  7) Other comments: None    Addi Johnston, EMT.      "
negative

## 2023-04-17 RX ORDER — ATORVASTATIN CALCIUM 80 MG/1
TABLET, FILM COATED ORAL
Refills: 0 | Status: COMPLETED | COMMUNITY
End: 2023-04-17

## 2023-04-17 RX ORDER — CLOPIDOGREL 75 MG/1
75 TABLET, FILM COATED ORAL
Refills: 0 | Status: COMPLETED | COMMUNITY
End: 2023-04-17

## 2023-04-18 ENCOUNTER — TRANSCRIPTION ENCOUNTER (OUTPATIENT)
Age: 58
End: 2023-04-18

## 2023-05-12 ENCOUNTER — FORM ENCOUNTER (OUTPATIENT)
Age: 58
End: 2023-05-12

## 2023-05-12 ENCOUNTER — NON-APPOINTMENT (OUTPATIENT)
Age: 58
End: 2023-05-12

## 2023-05-22 ENCOUNTER — OUTPATIENT (OUTPATIENT)
Dept: OUTPATIENT SERVICES | Facility: HOSPITAL | Age: 58
LOS: 1 days | End: 2023-05-22
Payer: COMMERCIAL

## 2023-05-22 ENCOUNTER — APPOINTMENT (OUTPATIENT)
Dept: RADIOLOGY | Facility: CLINIC | Age: 58
End: 2023-05-22
Payer: COMMERCIAL

## 2023-05-22 DIAGNOSIS — E21.3 HYPERPARATHYROIDISM, UNSPECIFIED: ICD-10-CM

## 2023-05-22 DIAGNOSIS — Z98.84 BARIATRIC SURGERY STATUS: Chronic | ICD-10-CM

## 2023-05-22 DIAGNOSIS — Z98.890 OTHER SPECIFIED POSTPROCEDURAL STATES: Chronic | ICD-10-CM

## 2023-05-22 DIAGNOSIS — Z95.5 PRESENCE OF CORONARY ANGIOPLASTY IMPLANT AND GRAFT: Chronic | ICD-10-CM

## 2023-05-22 DIAGNOSIS — E11.40 TYPE 2 DIABETES MELLITUS WITH DIABETIC NEUROPATHY, UNSPECIFIED: ICD-10-CM

## 2023-05-22 DIAGNOSIS — I25.10 ATHEROSCLEROTIC HEART DISEASE OF NATIVE CORONARY ARTERY WITHOUT ANGINA PECTORIS: ICD-10-CM

## 2023-05-22 PROCEDURE — 77080 DXA BONE DENSITY AXIAL: CPT | Mod: 26

## 2023-05-22 PROCEDURE — 77080 DXA BONE DENSITY AXIAL: CPT

## 2023-05-23 ENCOUNTER — TRANSCRIPTION ENCOUNTER (OUTPATIENT)
Age: 58
End: 2023-05-23

## 2023-05-23 NOTE — ED ADULT NURSE NOTE - DOES PATIENT HAVE ADVANCE DIRECTIVE
Child/Adolescent Psychiatry Progress Note      Patient Name:  Tianna Sheldon   MR#:  [id number]    Date of Session:  5/23/2023       Start time/Stop time:  9:30 a.m./10:00 a.m.     Patient was seen for 30 minutes which also included review of EMR.This visit was performed via live two-way video with patient's verbal consent.   Clinician Location:  [facility].  Patient Location:  Home.    Tianna is in [place] and identity has been established.     She was informed that consent to treat includes permission to submit charges to the applicable insurance on file.  Tianna was advised regarding the potential risk inherent in video visits, as the assessment may be limited due to what can be seen on the screen which potentially results in an incomplete assessment; as well as either of us may discontinue the video visit if it is.    Chief Complaint    Chief Complaint   Patient presents with   • Video Visit       Risk Assessment    (Homicide/Suicide Ideation/Plan/Threat of Harm to Self/Others)  Minimal on today's date    Update on School    Patient just completed her freshman year at [employer] in [facility].  She reports successful 1st year academically.  She continues to pursue a career as a .  She did report some increased anxiety and “shaking “as well as a couple of panic attacks over the 2nd semester.    Update on Home    Significant conflicts also in addition to the transition to college with her roommate where she states “we just did not talk to each other “.  She will be rooming with 2 of her friends next year.    Response to Medications    (Including compliance, side effects, lab results)  Compliant with medications.  Sleep and appetite fair.  Plans to take a walking course next year for exercise.  She will be working at [employer] over the summer    Mental Status Exam    Orientation:  X3  Appearance:  Average height and weight  Speech:  Clear  Eye contact:  Fair  Psychomotor:  No tics  observed or reported  Mood:  Neutral   Affect:  Bright  Thought Process:  Goal directed  Thought Content:  No voices or visions      Suicidal Ideation:  None reported      Homicidal Ideation:  Not applicable    Diagnoses:  Generalized anxiety disorder; major depression moderate recurrent      Informed Consent for Psychotropic Medications Prescribed Signed by Patient/Parent/Legal Guardian    Plan    Options discussed.  Again discussed increase duloxetine to 30 mg but at this point time the patient would like to continue the duloxetine at 20 mg given she is out of school.  She will consider the increase later when she returns to campus in the fall and contact me if needed.  Next med check over winter break but again patient will contact me earlier if she would like to increase the duloxetine to 30 mg.  Dosing strategies for duloxetine were reviewed    Gregory Boyd MD   Yes

## 2023-08-14 ENCOUNTER — TRANSCRIPTION ENCOUNTER (OUTPATIENT)
Age: 58
End: 2023-08-14

## 2023-08-15 ENCOUNTER — APPOINTMENT (OUTPATIENT)
Dept: FAMILY MEDICINE | Facility: CLINIC | Age: 58
End: 2023-08-15
Payer: COMMERCIAL

## 2023-08-15 VITALS
SYSTOLIC BLOOD PRESSURE: 116 MMHG | WEIGHT: 223 LBS | BODY MASS INDEX: 31.92 KG/M2 | HEART RATE: 66 BPM | HEIGHT: 70 IN | DIASTOLIC BLOOD PRESSURE: 80 MMHG | TEMPERATURE: 97.6 F | OXYGEN SATURATION: 97 %

## 2023-08-15 DIAGNOSIS — R53.83 OTHER FATIGUE: ICD-10-CM

## 2023-08-15 DIAGNOSIS — R82.90 UNSPECIFIED ABNORMAL FINDINGS IN URINE: ICD-10-CM

## 2023-08-15 DIAGNOSIS — R41.89 OTHER SYMPTOMS AND SIGNS INVOLVING COGNITIVE FUNCTIONS AND AWARENESS: ICD-10-CM

## 2023-08-15 PROCEDURE — 99214 OFFICE O/P EST MOD 30 MIN: CPT

## 2023-08-15 RX ORDER — HYDROCHLOROTHIAZIDE 25 MG/1
25 TABLET ORAL
Qty: 30 | Refills: 0 | Status: ACTIVE | COMMUNITY
Start: 2022-10-07

## 2023-08-15 RX ORDER — LOSARTAN POTASSIUM 50 MG/1
50 TABLET, FILM COATED ORAL
Refills: 0 | Status: ACTIVE | COMMUNITY
Start: 2020-05-18

## 2023-08-15 RX ORDER — ALPRAZOLAM 0.25 MG/1
0.25 TABLET ORAL
Qty: 30 | Refills: 0 | Status: DISCONTINUED | COMMUNITY
Start: 2021-09-07 | End: 2023-08-15

## 2023-08-15 NOTE — REVIEW OF SYSTEMS
[Fever] : no fever [Chills] : no chills [Fatigue] : fatigue [Recent Change In Weight] : ~T no recent weight change [Dysuria] : no dysuria [Hematuria] : no hematuria [Negative] : Heme/Lymph [FreeTextEntry8] : sediment in urine [de-identified] : brain fog, twitching

## 2023-08-15 NOTE — HISTORY OF PRESENT ILLNESS
[FreeTextEntry1] : Follow up brain fog, etc. [de-identified] : Pt has been having brain fog, anxiety, twitching etc. s/p bariatric surgery in 2019.  Pt has had w/u with Endocrinology and Neurology which have been normal.  Pt/pt's wife requesting vitamin B1 and B12 studies today.  Pt currently taking OTC B1 and B complex which has improved twitching, neurologic symptoms, and anxiety.  Pt has hx B12 deficiency which he received injections for about 15 years ago.  Pt has had sediment in his urine for the past 1 year, since beginning Jerry Stone supplement recommended by Urology, Dr. Whitaker.  Pt denies dysuria or recurrence of any kidney stones, but would like urine tested today.  Cardiology:  Dr. Long. Neurology:  Dr. SUSIE Waldrop ([place]). Urology:  Dr. Whitaker.

## 2023-08-15 NOTE — PHYSICAL EXAM
[No Edema] : there was no peripheral edema [Normal] : no rash [Coordination Grossly Intact] : coordination grossly intact [No Focal Deficits] : no focal deficits [Normal Gait] : normal gait [Normal Affect] : the affect was normal [Normal Insight/Judgement] : insight and judgment were intact [de-identified] : 2 polyps within right EAC, 1 polyp within left EAC

## 2023-08-15 NOTE — ASSESSMENT
[FreeTextEntry1] : Pt is a 59yo male who presents to the office to f/u on chronic brain fog since gastric sleeve in 2019.  Pt presents with his wife.  Requesting extensive b/w today.  Of note, pt has been taking Thiamine and B12 supplements with improvement of some neurologic symptoms (like falling over works and twitching).  Pt has also had sediment in his urine x1 year since starting a supplement to prevent kidney stones recommended by Dr. Whitaker and would like urine tested today.  Labs drawn in office. Urine sent for testing. Pt to continue current B12/Thiamine supplements.  +/- B12 injections if B12 levels remain low/low-normal.  Call the office or go to the ED immediately if you develop new, worsening or concerning symptoms including high fever, severe headache/worst headache of your life, confusion, dizziness/lightheadedness, loss of consciousness, severe chest pain, difficulty breathing, shortness of breath, severe abdominal pain, excessive vomiting/diarrhea, inability to feel/move the extremities, or any other concerning symptoms.

## 2023-08-16 ENCOUNTER — TRANSCRIPTION ENCOUNTER (OUTPATIENT)
Age: 58
End: 2023-08-16

## 2023-08-16 LAB
25(OH)D3 SERPL-MCNC: 35.9 NG/ML
ALBUMIN SERPL ELPH-MCNC: 4.5 G/DL
ALP BLD-CCNC: 99 U/L
ALT SERPL-CCNC: 26 U/L
ANION GAP SERPL CALC-SCNC: 14 MMOL/L
APPEARANCE: CLEAR
AST SERPL-CCNC: 35 U/L
BACTERIA: NEGATIVE /HPF
BILIRUB DIRECT SERPL-MCNC: 0.2 MG/DL
BILIRUB INDIRECT SERPL-MCNC: 0.7 MG/DL
BILIRUB SERPL-MCNC: 0.9 MG/DL
BILIRUBIN URINE: NEGATIVE
BLOOD URINE: NEGATIVE
BUN SERPL-MCNC: 34 MG/DL
CALCIUM SERPL-MCNC: 9.7 MG/DL
CALCIUM SERPL-MCNC: 9.7 MG/DL
CAST: 1 /LPF
CHLORIDE SERPL-SCNC: 100 MMOL/L
CO2 SERPL-SCNC: 26 MMOL/L
COLOR: YELLOW
CREAT SERPL-MCNC: 1.3 MG/DL
CRP SERPL HS-MCNC: 0.98 MG/L
EGFR: 64 ML/MIN/1.73M2
EPITHELIAL CELLS: 0 /HPF
ESTIMATED AVERAGE GLUCOSE: 100 MG/DL
FERRITIN SERPL-MCNC: 31 NG/ML
FOLATE SERPL-MCNC: 6.2 NG/ML
GLUCOSE QUALITATIVE U: NEGATIVE MG/DL
GLUCOSE SERPL-MCNC: 82 MG/DL
HBA1C MFR BLD HPLC: 5.1 %
INSULIN SERPL-MCNC: 16.6 UU/ML
IRON SATN MFR SERPL: 23 %
IRON SERPL-MCNC: 78 UG/DL
KETONES URINE: NEGATIVE MG/DL
LEUKOCYTE ESTERASE URINE: NEGATIVE
MICROSCOPIC-UA: NORMAL
NITRITE URINE: NEGATIVE
PARATHYROID HORMONE INTACT: 80 PG/ML
PH URINE: 5.5
PHOSPHATE SERPL-MCNC: 3.9 MG/DL
POTASSIUM SERPL-SCNC: 3.9 MMOL/L
PROT SERPL-MCNC: 7 G/DL
PROTEIN URINE: NEGATIVE MG/DL
RED BLOOD CELLS URINE: 0 /HPF
SODIUM SERPL-SCNC: 139 MMOL/L
SPECIFIC GRAVITY URINE: 1.02
T3FREE SERPL-MCNC: 2.87 PG/ML
T4 FREE SERPL-MCNC: 1.1 NG/DL
TIBC SERPL-MCNC: 341 UG/DL
TSH SERPL-ACNC: 2.64 UIU/ML
UIBC SERPL-MCNC: 263 UG/DL
UROBILINOGEN URINE: 1 MG/DL
VIT B12 SERPL-MCNC: 518 PG/ML
WHITE BLOOD CELLS URINE: 1 /HPF

## 2023-08-17 ENCOUNTER — TRANSCRIPTION ENCOUNTER (OUTPATIENT)
Age: 58
End: 2023-08-17

## 2023-08-22 ENCOUNTER — TRANSCRIPTION ENCOUNTER (OUTPATIENT)
Age: 58
End: 2023-08-22

## 2023-08-22 LAB
BACTERIA UR CULT: NORMAL
IF BLOCK AB SER QL: 1.1 AU/ML
URINE CYTOLOGY: NORMAL
VIT A SERPL-MCNC: 53.4 UG/DL
VIT B1 SERPL-MCNC: 190.7 NMOL/L
VIT C SERPL-MCNC: 0.6 MG/DL
ZINC SERPL-MCNC: 62 UG/DL

## 2023-08-26 ENCOUNTER — APPOINTMENT (OUTPATIENT)
Dept: ULTRASOUND IMAGING | Facility: CLINIC | Age: 58
End: 2023-08-26
Payer: COMMERCIAL

## 2023-08-26 ENCOUNTER — OUTPATIENT (OUTPATIENT)
Dept: OUTPATIENT SERVICES | Facility: HOSPITAL | Age: 58
LOS: 1 days | End: 2023-08-26
Payer: COMMERCIAL

## 2023-08-26 DIAGNOSIS — Z95.5 PRESENCE OF CORONARY ANGIOPLASTY IMPLANT AND GRAFT: Chronic | ICD-10-CM

## 2023-08-26 DIAGNOSIS — Z98.890 OTHER SPECIFIED POSTPROCEDURAL STATES: Chronic | ICD-10-CM

## 2023-08-26 DIAGNOSIS — N20.0 CALCULUS OF KIDNEY: ICD-10-CM

## 2023-08-26 DIAGNOSIS — Z98.84 BARIATRIC SURGERY STATUS: Chronic | ICD-10-CM

## 2023-08-26 PROCEDURE — 76775 US EXAM ABDO BACK WALL LIM: CPT | Mod: 26

## 2023-08-26 PROCEDURE — 76775 US EXAM ABDO BACK WALL LIM: CPT

## 2023-09-01 ENCOUNTER — TRANSCRIPTION ENCOUNTER (OUTPATIENT)
Age: 58
End: 2023-09-01

## 2023-09-12 ENCOUNTER — APPOINTMENT (OUTPATIENT)
Dept: NEUROLOGY | Facility: CLINIC | Age: 58
End: 2023-09-12

## 2023-09-18 ENCOUNTER — APPOINTMENT (OUTPATIENT)
Dept: UROLOGY | Facility: CLINIC | Age: 58
End: 2023-09-18
Payer: COMMERCIAL

## 2023-09-18 VITALS
HEIGHT: 70 IN | BODY MASS INDEX: 32.64 KG/M2 | SYSTOLIC BLOOD PRESSURE: 100 MMHG | DIASTOLIC BLOOD PRESSURE: 73 MMHG | WEIGHT: 228 LBS | HEART RATE: 73 BPM | OXYGEN SATURATION: 96 %

## 2023-09-18 DIAGNOSIS — N20.0 CALCULUS OF KIDNEY: ICD-10-CM

## 2023-09-18 PROCEDURE — 99213 OFFICE O/P EST LOW 20 MIN: CPT

## 2023-09-20 RX ORDER — POTASSIUM CITRATE 15 MEQ/1
15 MEQ TABLET, EXTENDED RELEASE ORAL TWICE DAILY
Qty: 360 | Refills: 3 | Status: ACTIVE | COMMUNITY
Start: 2023-09-18 | End: 1900-01-01

## 2023-09-21 ENCOUNTER — INPATIENT (INPATIENT)
Facility: HOSPITAL | Age: 58
LOS: 0 days | Discharge: HOME | DRG: 69 | End: 2023-09-22
Attending: STUDENT IN AN ORGANIZED HEALTH CARE EDUCATION/TRAINING PROGRAM | Admitting: INTERNAL MEDICINE
Payer: COMMERCIAL

## 2023-09-21 VITALS
DIASTOLIC BLOOD PRESSURE: 92 MMHG | HEART RATE: 50 BPM | SYSTOLIC BLOOD PRESSURE: 140 MMHG | HEIGHT: 70 IN | WEIGHT: 220.02 LBS | RESPIRATION RATE: 18 BRPM | OXYGEN SATURATION: 97 % | TEMPERATURE: 99 F

## 2023-09-21 DIAGNOSIS — Z98.890 OTHER SPECIFIED POSTPROCEDURAL STATES: Chronic | ICD-10-CM

## 2023-09-21 DIAGNOSIS — Z95.5 PRESENCE OF CORONARY ANGIOPLASTY IMPLANT AND GRAFT: Chronic | ICD-10-CM

## 2023-09-21 DIAGNOSIS — Z98.84 BARIATRIC SURGERY STATUS: Chronic | ICD-10-CM

## 2023-09-21 DIAGNOSIS — G45.9 TRANSIENT CEREBRAL ISCHEMIC ATTACK, UNSPECIFIED: ICD-10-CM

## 2023-09-21 LAB
ALBUMIN SERPL ELPH-MCNC: 3.9 G/DL — SIGNIFICANT CHANGE UP (ref 3.3–5)
ALP SERPL-CCNC: 93 U/L — SIGNIFICANT CHANGE UP (ref 40–120)
ALT FLD-CCNC: 36 U/L — SIGNIFICANT CHANGE UP (ref 12–78)
ANION GAP SERPL CALC-SCNC: 2 MMOL/L — LOW (ref 5–17)
APTT BLD: 29.8 SEC — SIGNIFICANT CHANGE UP (ref 24.5–35.6)
AST SERPL-CCNC: 36 U/L — SIGNIFICANT CHANGE UP (ref 15–37)
BASOPHILS # BLD AUTO: 0.03 K/UL — SIGNIFICANT CHANGE UP (ref 0–0.2)
BASOPHILS NFR BLD AUTO: 0.4 % — SIGNIFICANT CHANGE UP (ref 0–2)
BILIRUB SERPL-MCNC: 1 MG/DL — SIGNIFICANT CHANGE UP (ref 0.2–1.2)
BUN SERPL-MCNC: 30 MG/DL — HIGH (ref 7–23)
CALCIUM SERPL-MCNC: 9.3 MG/DL — SIGNIFICANT CHANGE UP (ref 8.5–10.1)
CHLORIDE SERPL-SCNC: 107 MMOL/L — SIGNIFICANT CHANGE UP (ref 96–108)
CO2 SERPL-SCNC: 31 MMOL/L — SIGNIFICANT CHANGE UP (ref 22–31)
CREAT SERPL-MCNC: 1.39 MG/DL — HIGH (ref 0.5–1.3)
EGFR: 59 ML/MIN/1.73M2 — LOW
EOSINOPHIL # BLD AUTO: 0.28 K/UL — SIGNIFICANT CHANGE UP (ref 0–0.5)
EOSINOPHIL NFR BLD AUTO: 3.3 % — SIGNIFICANT CHANGE UP (ref 0–6)
GLUCOSE SERPL-MCNC: 88 MG/DL — SIGNIFICANT CHANGE UP (ref 70–99)
HCT VFR BLD CALC: 45 % — SIGNIFICANT CHANGE UP (ref 39–50)
HGB BLD-MCNC: 15.6 G/DL — SIGNIFICANT CHANGE UP (ref 13–17)
IMM GRANULOCYTES NFR BLD AUTO: 0.1 % — SIGNIFICANT CHANGE UP (ref 0–0.9)
INR BLD: 0.95 RATIO — SIGNIFICANT CHANGE UP (ref 0.85–1.18)
LYMPHOCYTES # BLD AUTO: 1.85 K/UL — SIGNIFICANT CHANGE UP (ref 1–3.3)
LYMPHOCYTES # BLD AUTO: 21.7 % — SIGNIFICANT CHANGE UP (ref 13–44)
MCHC RBC-ENTMCNC: 33.3 PG — SIGNIFICANT CHANGE UP (ref 27–34)
MCHC RBC-ENTMCNC: 34.7 GM/DL — SIGNIFICANT CHANGE UP (ref 32–36)
MCV RBC AUTO: 96.2 FL — SIGNIFICANT CHANGE UP (ref 80–100)
MONOCYTES # BLD AUTO: 0.75 K/UL — SIGNIFICANT CHANGE UP (ref 0–0.9)
MONOCYTES NFR BLD AUTO: 8.8 % — SIGNIFICANT CHANGE UP (ref 2–14)
NEUTROPHILS # BLD AUTO: 5.6 K/UL — SIGNIFICANT CHANGE UP (ref 1.8–7.4)
NEUTROPHILS NFR BLD AUTO: 65.7 % — SIGNIFICANT CHANGE UP (ref 43–77)
PLATELET # BLD AUTO: 281 K/UL — SIGNIFICANT CHANGE UP (ref 150–400)
POTASSIUM SERPL-MCNC: 4 MMOL/L — SIGNIFICANT CHANGE UP (ref 3.5–5.3)
POTASSIUM SERPL-SCNC: 4 MMOL/L — SIGNIFICANT CHANGE UP (ref 3.5–5.3)
PROT SERPL-MCNC: 7.3 GM/DL — SIGNIFICANT CHANGE UP (ref 6–8.3)
PROTHROM AB SERPL-ACNC: 10.7 SEC — SIGNIFICANT CHANGE UP (ref 9.5–13)
RBC # BLD: 4.68 M/UL — SIGNIFICANT CHANGE UP (ref 4.2–5.8)
RBC # FLD: 12.6 % — SIGNIFICANT CHANGE UP (ref 10.3–14.5)
SODIUM SERPL-SCNC: 140 MMOL/L — SIGNIFICANT CHANGE UP (ref 135–145)
TROPONIN I, HIGH SENSITIVITY RESULT: 11.14 NG/L — SIGNIFICANT CHANGE UP
TROPONIN I, HIGH SENSITIVITY RESULT: 11.27 NG/L — SIGNIFICANT CHANGE UP
WBC # BLD: 8.52 K/UL — SIGNIFICANT CHANGE UP (ref 3.8–10.5)
WBC # FLD AUTO: 8.52 K/UL — SIGNIFICANT CHANGE UP (ref 3.8–10.5)

## 2023-09-21 PROCEDURE — 92610 EVALUATE SWALLOWING FUNCTION: CPT | Mod: GN

## 2023-09-21 PROCEDURE — 70551 MRI BRAIN STEM W/O DYE: CPT

## 2023-09-21 PROCEDURE — 70496 CT ANGIOGRAPHY HEAD: CPT | Mod: 26,MA

## 2023-09-21 PROCEDURE — 97530 THERAPEUTIC ACTIVITIES: CPT | Mod: GO

## 2023-09-21 PROCEDURE — 97165 OT EVAL LOW COMPLEX 30 MIN: CPT | Mod: GO

## 2023-09-21 PROCEDURE — 70498 CT ANGIOGRAPHY NECK: CPT | Mod: 26,MA

## 2023-09-21 PROCEDURE — 93010 ELECTROCARDIOGRAM REPORT: CPT

## 2023-09-21 PROCEDURE — 80061 LIPID PANEL: CPT

## 2023-09-21 PROCEDURE — 70450 CT HEAD/BRAIN W/O DYE: CPT | Mod: 26,MA,59

## 2023-09-21 PROCEDURE — 99291 CRITICAL CARE FIRST HOUR: CPT

## 2023-09-21 PROCEDURE — 92523 SPEECH SOUND LANG COMPREHEN: CPT | Mod: GN

## 2023-09-21 PROCEDURE — 36415 COLL VENOUS BLD VENIPUNCTURE: CPT

## 2023-09-21 PROCEDURE — 80048 BASIC METABOLIC PNL TOTAL CA: CPT

## 2023-09-21 PROCEDURE — 93306 TTE W/DOPPLER COMPLETE: CPT

## 2023-09-21 PROCEDURE — 83036 HEMOGLOBIN GLYCOSYLATED A1C: CPT

## 2023-09-21 PROCEDURE — 0042T: CPT | Mod: MA

## 2023-09-21 NOTE — ED PROVIDER NOTE - ST/T WAVE
[facility] O[facility]YN  OFFICE VISIT      SUBJECTIVE:    Maria Teresa Leggett is a 41 year old female presenting with  seen today for complaint of vaginal bleeding after hysterectomy.  Been 4 weeks since that time.  She also has a intense odor associated with it.  This is happened yesterday.  She states that this issue began about 2 days after she pushed really hard because of constipation.  She is now taking stool softeners.  She has not had intercourse.    PAST HISTORIES:  I have reviewed the past medical history, family history, social history, medications and allergies listed in the medical record as obtained by my nursing staff and support staff and agree with their documentation.    REVIEW OF SYSTEMS:    All other systems are reviewed and are negative except as noted in the History of Present Illness.     OBJECTIVE:  PHYSICAL EXAM:    Vital Signs:   Visit Vitals  BP (!) 144/80 (BP Location: LUE - Left upper extremity, Patient Position: Sitting, Cuff Size: Regular)   Ht 5' 5\" (1.651 m)   Wt 88.6 kg (195 lb 5.2 oz)   LMP 09/05/2022 (Exact Date) Comment: 9/17/2022 Robotic Hysterectomy    BMI 32.50 kg/m²      General Appearance: Well groomed.   Neuropsychiatric: Mood and affect appropriate.    Pelvic:  External genitalia:  Normal.  Vagina: Normal appearance.  Brownish discharge.  There is a very small scratch a centimeter above the incision line which is otherwise intact and nontender  Cervix: Absent  Uterus: Absent  Adnexa: No masses or tenderness.     Nontender  ASSESSMENT:    1. Vaginal bleeding        PLAN:    Patient was reassured.  I think the discharge may be from some old blood and has been colonized by bacteria.  I will put her on metronidazole 500 mg twice a day for the next 7 days.  At this point I does want see her back in 4 weeks.  Everything else is healing quite well and she is doing very well.  Her postop diagnosis was consistent with extensive adenomyosis.  She still retains both her  ovaries.         No MATT.

## 2023-09-21 NOTE — ED ADULT NURSE NOTE - NSFALLHARMRISKINTERV_ED_ALL_ED

## 2023-09-21 NOTE — PATIENT PROFILE ADULT - FALL HARM RISK - HARM RISK INTERVENTIONS
Assistance with ambulation/Communicate Risk of Fall with Harm to all staff/Monitor for mental status changes/Monitor gait and stability/Reinforce activity limits and safety measures with patient and family/Sit up slowly, dangle for a short time, stand at bedside before walking/Tailored Fall Risk Interventions/Use of alarms - bed, chair and/or voice tab/Visual Cue: Yellow wristband and red socks/Bed in lowest position, wheels locked, appropriate side rails in place/Call bell, personal items and telephone in reach/Instruct patient to call for assistance before getting out of bed or chair/Non-slip footwear when patient is out of bed/[place] to call system/Physically safe environment - no spills, clutter or unnecessary equipment/Purposeful Proactive Rounding/Room/bathroom lighting operational, light cord in reach

## 2023-09-21 NOTE — ED PROVIDER NOTE - PROGRESS NOTE DETAILS
Carrie Malhotra: Patient evaluated immediately upon arrival, code stroke called. All labs reviewed and are nonactionable, patient has negative Trope x2.  CTA without any acute findings.  Patient seen by neurology, will admit for TIA work-up.  Discussed plan with patient and all questions answered.  Patient endorsed to hospitalist for admission on telemetry.

## 2023-09-21 NOTE — ED PROVIDER NOTE - TOBACCO USE
August 21, 2019        Silvano Richards  [address] [place] [id number]        Dear Al:    Please allow me to introduce myself, and the services available to you in collaboration with your health insurance. My name is Sintia Rendon, and I am a Nurse Navigator with [facility].     As a valuable health care resource, my goal is to help you optimize your health and well-being to create a better health care experience. From helping you select and locate an in-network provider, to coordinating appointments and answering health-related questions, these services are designed to give you another level of individualized care. I've included a brochure for additional information on my role and the services offered.    There is no charge to you for my services. Please know that I am an employee of [facility], and like any other health care related matters, all information and services remain strictly confidential. In some cases, individuals are referred to a Nurse Navigator by their physician or following an inpatient or outpatient hospitalization.     I feel privileged to be able to offer these services and look forward to the opportunity to work with you for optimal health and wellness.    Sincerely,          Sintia VIGIL, RN, [facility]  Nurse Navigator  Phone: [phone]       [place] offers online access to your health information via [email or web address] .   By registering for [employer] you will be able to view test results, pay your bill, send messages to your care team (not for immediate response), refill prescriptions, schedule and verify appointments.   Unknown if ever smoked

## 2023-09-21 NOTE — ED ADULT NURSE NOTE - OBJECTIVE STATEMENT
Pt in ED c/o Code stroke.  Pt denies any symptoms at this time.  Dysphagia screen passed, NIH score 0.  Pt breathing symmetrical and unlabored, cardiac monitoring in place, #20 IV inserted into Rupperarm, bloods drawn and sent to lab.  Pt in no acute distress at this time.

## 2023-09-21 NOTE — PATIENT PROFILE ADULT - LIVING ENVIRONMENT
no
Principal Discharge DX:	Paronychia of finger of right hand  Assessment and plan of treatment:	Patient advised to follow up with PRIMARY CARE DOCTOR IN 1-2 DAYS AND HAND SPECIALIST  and told to return to the emergency department immediately for any new or concerning symptoms such as fevers, chills, worsening redness, pain, decreased movement OR ANY OTHER COMPLAINTS. Patient agrees with plan.    -Continue antibiotics  -keep are clean, dry and covered   -Continue warm soaks/compresses at home.

## 2023-09-21 NOTE — STROKE CODE NOTE - NSMDCONSULT QTN_Y FT
HPI:  Patient is a 58y male with a PMH of HTN, HLD, MI s/p stent (in ), on ASA and Plavix, morbid obesity s/p gastric sleeve (in 2019) presents to the ED with L arm numbness, chest tightness. Pt was at work at 1pm when he started to feel left arm numbness and tingling, followed by chest tightness, and headache. Also endorsed blurred vision, and "brain fog." He reports symptoms lasted about 30 minutes and resolved on its own. Denies chills, fever, n/v/d, CP, SOB, tingling, weakness, or numbness at present.       PAST MEDICAL & SURGICAL HISTORY:  CAD (coronary artery disease)  1 stent to RCA      Heart attack        Hypertension, unspecified type      Hyperlipidemia, unspecified hyperlipidemia type      History of kidney stones      Idiopathic neuropathy  B/L feet and legs      Renal calculus      GERD (gastroesophageal reflux disease)      H/O shoulder surgery  Right      S/P drug eluting coronary stent placement        History of endoscopy      S/P laparoscopic sleeve gastrectomy  3/2019          FAMILY HISTORY:  Family history of premature coronary artery disease (Father)  Father had 3 vessel CABG at age 39 and  at 61          Social Hx:  Nonsmoker, no drug or alcohol use    MEDICATIONS  (STANDING):       Allergies  No Known Allergies  Intolerances        ROS: Pertinent positives in HPI, all other ROS were reviewed and are negative.      Vital Signs Last 24 Hrs  T(C): 37 (21 Sep 2023 17:14), Max: 37 (21 Sep 2023 17:14)  T(F): 98.6 (21 Sep 2023 17:14), Max: 98.6 (21 Sep 2023 17:14)  HR: 50 (21 Sep 2023 17:14) (50 - 50)  BP: 140/92 (21 Sep 2023 17:14) (140/92 - 140/92)  BP(mean): --  RR: 18 (21 Sep 2023 17:14) (18 - 18)  SpO2: 97% (21 Sep 2023 17:14) (97% - 97%)    Parameters below as of 21 Sep 2023 17:14  Patient On (Oxygen Delivery Method): room air          Physical Exam:  Constitutional: awake and alert  HEENT: PERRLA, EOMI  Neck: Supple.  Respiratory: Breath sounds are clear bilaterally  Cardiovascular: S1 and S2, regular  rhythm  Gastrointestinal: soft, nontender  Extremities:  no edema  Musculoskeletal: no joint swelling/tenderness, no abnormal movements  Skin: No rashes    Neurological exam:  HF: A x O x 3. Appropriately interactive, normal affect. Speech fluent, No Aphasia or paraphasic errors. Naming /repetition intact   CN: PARKER, EOMI, VFF, facial sensation normal, no NLFD, tongue midline, Palate moves equally, SCM equal bilaterally  Motor: No pronator drift, Strength 5/5 in all 4 ext, normal bulk and tone, no tremor, rigidity or bradykinesia.    Sens: Intact to light touch  Reflexes: Symmetric and normal . BJ 2+, BR 2+, KJ 2+, AJ 2+, downgoing toes b/l  Coord:  No FNFA, dysmetria, PALLAVI intact   Gait/Balance: Normal    NIHSS:0          Labs:                        15.6   8.52  )-----------( 281      ( 21 Sep 2023 17:25 )             45.0         140  |  107  |  30<H>  ----------------------------<  88  4.0   |  31  |  1.39<H>    Ca    9.3      21 Sep 2023 17:25    TPro  7.3  /  Alb  3.9  /  TBili  1.0  /  DBili  x   /  AST  36  /  ALT  36  /  AlkPhos  93          PT/INR - ( 21 Sep 2023 17:25 )   PT: 10.7 sec;   INR: 0.95 ratio         PTT - ( 21 Sep 2023 17:25 )  PTT:29.8 sec    Radiology report:  CT Angio Neck Stroke Protocol w/ IV Cont (23 @ 17:34)  IMPRESSION:  1. No CT evidence of an acute infarct, hemorrhage, or space-occupying   lesion.  2. No perfusion abnormality or area of acute penumbra suggested.  3. Atherosclerotic changes and calcified plaque noted in both carotid   bifurcations in the neck, with mild to moderate narrowing of both   proximal internal carotid arteries. Patent vertebrals.  4. Widely patent vasculature noted intracranially.    Follow-up MR imaging of the brain recommended for further assessment.          A/P: Patient is a 58y male with a PMH of HTN, HLD, MI s/p stent (in ), on ASA and Plavix, morbid obesity s/p gastric sleeve (in 2019) presents to the ED with L arm numbness, chest tightness. HCT/CTA negative for infarct or LVO.    ? complex migraine vs TIA    - No IV tpa given outside time window, sxs resolved  - Admit telemetry  - TTE  - Continue ASA/ Plavix  - Statin  - Dysphagia screen  - DVT prophylaxis  - MRI brain  - PT eval  - Speech/swallow eval    Discussed with Dr. Hernandez

## 2023-09-21 NOTE — ED PROVIDER NOTE - OBJECTIVE STATEMENT
Pt is a 58y male with a PMH of HTN, HLD, MI s/p stent (in 2014), morbid obesity s/p gastric sleeve (in March 2019) presents to the ED for Code Stroke. Pt was at work at 1pm when he started to feel extremity numbness and tingling, followed by chest tightness, sweats, blurred vision, shakes, and "brain fog." Upon arrival to ED, states symptoms have since resolved, is in no distress. Denies chills, fever, n/v/d, CP, SOB, tingling, weakness, or numbness at present. NKA.

## 2023-09-21 NOTE — ED ADULT NURSE NOTE - NS_SISCREENINGSR_GEN_ALL_ED
Received call from patient - he states at his last OV with Dr Heyden he discussed that he increased his furosemide from 1/2 to 1 full tablet daily (80 mg total). The script was not changed and this was not noted in the OV. Patient states he has been taking 80 mg daily.     Advised would review with a covering provider - script is pending.    Negative

## 2023-09-21 NOTE — ED PROVIDER NOTE - CLINICAL SUMMARY MEDICAL DECISION MAKING FREE TEXT BOX
58y male, last known well 1pm. C/o L hand numbness, L sided CP, and visual disturbances. Has since resolved PTA. Called code stroke from [facility]. Plan for CT, cardiac evaluation, neuro consult, and reassess.

## 2023-09-21 NOTE — ED ADULT TRIAGE NOTE - CHIEF COMPLAINT QUOTE
Pt. A&Ox3, presenting to the ER with c/o tingling and numbness in the left arm. Pt reports having numbness and tingling in the left arm while at work. Complains of blurred vision and fogginess.   BGM in triage 88.   MD Malhotra evaluated patient in triage, code stroke called 5:12pm.

## 2023-09-22 ENCOUNTER — TRANSCRIPTION ENCOUNTER (OUTPATIENT)
Age: 58
End: 2023-09-22

## 2023-09-22 VITALS
RESPIRATION RATE: 18 BRPM | OXYGEN SATURATION: 99 % | SYSTOLIC BLOOD PRESSURE: 108 MMHG | HEART RATE: 73 BPM | DIASTOLIC BLOOD PRESSURE: 84 MMHG

## 2023-09-22 LAB
A1C WITH ESTIMATED AVERAGE GLUCOSE RESULT: 5.4 % — SIGNIFICANT CHANGE UP (ref 4–5.6)
ANION GAP SERPL CALC-SCNC: 3 MMOL/L — LOW (ref 5–17)
BUN SERPL-MCNC: 28 MG/DL — HIGH (ref 7–23)
CALCIUM SERPL-MCNC: 9.5 MG/DL — SIGNIFICANT CHANGE UP (ref 8.5–10.1)
CHLORIDE SERPL-SCNC: 107 MMOL/L — SIGNIFICANT CHANGE UP (ref 96–108)
CHOLEST SERPL-MCNC: 131 MG/DL — SIGNIFICANT CHANGE UP
CO2 SERPL-SCNC: 33 MMOL/L — HIGH (ref 22–31)
CREAT SERPL-MCNC: 1.24 MG/DL — SIGNIFICANT CHANGE UP (ref 0.5–1.3)
EGFR: 68 ML/MIN/1.73M2 — SIGNIFICANT CHANGE UP
ESTIMATED AVERAGE GLUCOSE: 108 MG/DL — SIGNIFICANT CHANGE UP (ref 68–114)
GLUCOSE SERPL-MCNC: 85 MG/DL — SIGNIFICANT CHANGE UP (ref 70–99)
HDLC SERPL-MCNC: 37 MG/DL — LOW
LIPID PNL WITH DIRECT LDL SERPL: 74 MG/DL — SIGNIFICANT CHANGE UP
NON HDL CHOLESTEROL: 94 MG/DL — SIGNIFICANT CHANGE UP
POTASSIUM SERPL-MCNC: 4.2 MMOL/L — SIGNIFICANT CHANGE UP (ref 3.5–5.3)
POTASSIUM SERPL-SCNC: 4.2 MMOL/L — SIGNIFICANT CHANGE UP (ref 3.5–5.3)
SODIUM SERPL-SCNC: 143 MMOL/L — SIGNIFICANT CHANGE UP (ref 135–145)
TRIGL SERPL-MCNC: 113 MG/DL — SIGNIFICANT CHANGE UP

## 2023-09-22 PROCEDURE — 93306 TTE W/DOPPLER COMPLETE: CPT | Mod: 26

## 2023-09-22 PROCEDURE — 99223 1ST HOSP IP/OBS HIGH 75: CPT

## 2023-09-22 PROCEDURE — 99236 HOSP IP/OBS SAME DATE HI 85: CPT

## 2023-09-22 PROCEDURE — 70551 MRI BRAIN STEM W/O DYE: CPT | Mod: 26

## 2023-09-22 PROCEDURE — 99232 SBSQ HOSP IP/OBS MODERATE 35: CPT

## 2023-09-22 PROCEDURE — 99497 ADVNCD CARE PLAN 30 MIN: CPT | Mod: 25

## 2023-09-22 RX ORDER — ATORVASTATIN CALCIUM 80 MG/1
40 TABLET, FILM COATED ORAL AT BEDTIME
Refills: 0 | Status: DISCONTINUED | OUTPATIENT
Start: 2023-09-22 | End: 2023-09-22

## 2023-09-22 RX ORDER — LANOLIN ALCOHOL/MO/W.PET/CERES
10 CREAM (GRAM) TOPICAL AT BEDTIME
Refills: 0 | Status: DISCONTINUED | OUTPATIENT
Start: 2023-09-22 | End: 2023-09-22

## 2023-09-22 RX ORDER — BUPROPION HYDROCHLORIDE 150 MG/1
75 TABLET, EXTENDED RELEASE ORAL DAILY
Refills: 0 | Status: DISCONTINUED | OUTPATIENT
Start: 2023-09-22 | End: 2023-09-22

## 2023-09-22 RX ORDER — HYDROCHLOROTHIAZIDE 25 MG
1 TABLET ORAL
Refills: 0 | DISCHARGE

## 2023-09-22 RX ORDER — CLOPIDOGREL BISULFATE 75 MG/1
75 TABLET, FILM COATED ORAL DAILY
Refills: 0 | Status: DISCONTINUED | OUTPATIENT
Start: 2023-09-22 | End: 2023-09-22

## 2023-09-22 RX ORDER — ASPIRIN/CALCIUM CARB/MAGNESIUM 324 MG
1 TABLET ORAL
Qty: 0 | Refills: 0 | DISCHARGE

## 2023-09-22 RX ORDER — ASPIRIN/CALCIUM CARB/MAGNESIUM 324 MG
81 TABLET ORAL DAILY
Refills: 0 | Status: DISCONTINUED | OUTPATIENT
Start: 2023-09-22 | End: 2023-09-22

## 2023-09-22 RX ORDER — LOSARTAN POTASSIUM 100 MG/1
1 TABLET, FILM COATED ORAL
Qty: 0 | Refills: 0 | DISCHARGE

## 2023-09-22 RX ORDER — ATORVASTATIN CALCIUM 80 MG/1
80 TABLET, FILM COATED ORAL AT BEDTIME
Refills: 0 | Status: DISCONTINUED | OUTPATIENT
Start: 2023-09-22 | End: 2023-09-22

## 2023-09-22 RX ORDER — GABAPENTIN 400 MG/1
1 CAPSULE ORAL
Qty: 0 | Refills: 0 | DISCHARGE

## 2023-09-22 RX ORDER — ATORVASTATIN CALCIUM 80 MG/1
1 TABLET, FILM COATED ORAL
Qty: 30 | Refills: 0
Start: 2023-09-22 | End: 2023-10-21

## 2023-09-22 RX ORDER — GABAPENTIN 400 MG/1
100 CAPSULE ORAL DAILY
Refills: 0 | Status: DISCONTINUED | OUTPATIENT
Start: 2023-09-22 | End: 2023-09-22

## 2023-09-22 RX ORDER — PANTOPRAZOLE SODIUM 20 MG/1
40 TABLET, DELAYED RELEASE ORAL
Refills: 0 | Status: DISCONTINUED | OUTPATIENT
Start: 2023-09-22 | End: 2023-09-22

## 2023-09-22 RX ORDER — POTASSIUM CITRATE MONOHYDRATE 100 %
1 POWDER (GRAM) MISCELLANEOUS
Refills: 0 | DISCHARGE

## 2023-09-22 RX ORDER — BUPROPION HYDROCHLORIDE 150 MG/1
1 TABLET, EXTENDED RELEASE ORAL
Refills: 0 | DISCHARGE

## 2023-09-22 RX ORDER — LANOLIN ALCOHOL/MO/W.PET/CERES
1 CREAM (GRAM) TOPICAL
Refills: 0 | DISCHARGE

## 2023-09-22 RX ORDER — LOSARTAN POTASSIUM 100 MG/1
50 TABLET, FILM COATED ORAL DAILY
Refills: 0 | Status: DISCONTINUED | OUTPATIENT
Start: 2023-09-22 | End: 2023-09-22

## 2023-09-22 RX ORDER — PANTOPRAZOLE SODIUM 20 MG/1
1 TABLET, DELAYED RELEASE ORAL
Qty: 0 | Refills: 0 | DISCHARGE

## 2023-09-22 RX ORDER — BUPROPION HYDROCHLORIDE 150 MG/1
0.5 TABLET, EXTENDED RELEASE ORAL
Refills: 0 | DISCHARGE

## 2023-09-22 RX ORDER — CLOPIDOGREL BISULFATE 75 MG/1
1 TABLET, FILM COATED ORAL
Qty: 0 | Refills: 0 | DISCHARGE

## 2023-09-22 RX ORDER — ATORVASTATIN CALCIUM 80 MG/1
1 TABLET, FILM COATED ORAL
Qty: 0 | Refills: 0 | DISCHARGE

## 2023-09-22 RX ORDER — LIDOCAINE 4 G/100G
1 CREAM TOPICAL
Qty: 3 | Refills: 0
Start: 2023-09-22 | End: 2023-10-05

## 2023-09-22 RX ORDER — LIDOCAINE 4 G/100G
1 CREAM TOPICAL DAILY
Refills: 0 | Status: DISCONTINUED | OUTPATIENT
Start: 2023-09-22 | End: 2023-09-22

## 2023-09-22 RX ADMIN — LOSARTAN POTASSIUM 50 MILLIGRAM(S): 100 TABLET, FILM COATED ORAL at 10:09

## 2023-09-22 RX ADMIN — PANTOPRAZOLE SODIUM 40 MILLIGRAM(S): 20 TABLET, DELAYED RELEASE ORAL at 06:05

## 2023-09-22 RX ADMIN — GABAPENTIN 100 MILLIGRAM(S): 400 CAPSULE ORAL at 10:09

## 2023-09-22 RX ADMIN — CLOPIDOGREL BISULFATE 75 MILLIGRAM(S): 75 TABLET, FILM COATED ORAL at 10:09

## 2023-09-22 RX ADMIN — Medication 81 MILLIGRAM(S): at 10:09

## 2023-09-22 RX ADMIN — LIDOCAINE 1 PATCH: 4 CREAM TOPICAL at 14:53

## 2023-09-22 NOTE — DISCHARGE NOTE PROVIDER - CARE PROVIDER_API CALL
Mukund Levin  [facility]  [address], [address]  [place] [address]  Phone: [phone]  Fax: [phone]  Follow Up Time:

## 2023-09-22 NOTE — DISCHARGE NOTE NURSING/CASE MANAGEMENT/SOCIAL WORK - NSDCVIVACCINE_GEN_ALL_CORE_FT
Tdap; 25-Sep-2021 11:30; Elisabeth Chaidez (JEREMIAH); [employer]; [id number] (Exp. Date: 28-Jan-2023); IntraMuscular; Deltoid Right.; 0.5 milliLiter(s); VIS (VIS Published: 09-May-2013, VIS Presented: 25-Sep-2021);

## 2023-09-22 NOTE — OCCUPATIONAL THERAPY INITIAL EVALUATION ADULT - MD ORDER
"OT Evaluate and Treat"- MD orders received. Chart reviewed, contents noted, conferred with RN "OT Evaluate and Treat"- MD orders received. Chart reviewed, contents noted, conferred with JEREMIAH Mendoza, orthostatics taken during IE: in bed- 108/87, 96-97%, HR 61, at /87, in standing 139/81, 0/10 pain.

## 2023-09-22 NOTE — PROGRESS NOTE ADULT - ATTENDING COMMENTS
57 yo Male with a PMH of HTN, HLD, MI s/p stent (in 2014), on ASA and Plavix, morbid obesity s/p gastric sleeve (in March 2019) presented to the ED with Left arm numbness, chest tightness. Patient was at work at 1pm when he started to feel left arm numbness and tingling, followed by chest tightness, and headache. Also endorsed blurred vision, and "brain fog." He reports symptoms lasted about 30 minutes and resolved on its own. Denies chills, fever, n/v/d, Chest pain, SOB, tingling, weakness, or numbness at present. No numbness now. No other complain.     Patient seen today, sitting up in chair at bedside, feels well, no further symptoms reported.     MRI without acute findings  TTE reviewed, no valvular disease, LVEF 55%, see below  Likely TIA, continue ASA, plavix, increase lipitor to 80mg qhs   Plan for d/c today, f/u with Cardiolgoist/PCP    < from: TTE Echo Complete w/o Contrast w/ Doppler (09.22.23 @ 11:02) >       Mitral Valve   The mitral valve leaflets appear mildly calcified with normal leaflet   excursion.   Trace mitral regurgitation is present.     Aortic Valve   The aortic valve is trileaflet with thin pliable leaflets.   Trace aortic regurgitation.     Tricuspid Valve   Trace tricuspid valve regurgitation.     Left Atrium   Normal appearing left atrium.     Left Ventricle   Mild concentric left ventricular hypertrophy is present.   The left ventricle is normal in size, wall motion, and contractility.   Estimated left ventricular ejection fraction is 55 %.     Right Atrium   The right atrium is not well seen, appears grossly normal.     Right Ventricle   Normal appearing right ventricle structure and function.     Pericardial Effusion   No evidence of pericardial effusion.     Pleural Effusion   No evidence of pleural effusion.     Miscellaneous   The IVC appears normal.     Impression     Summary     Normal biventricular size and systolic function. Estimated LVEF 55%.   No hemodynamically significant valvular disease.     Signature

## 2023-09-22 NOTE — PROGRESS NOTE ADULT - SUBJECTIVE AND OBJECTIVE BOX
HPI:  59 yo Male with a PMH of HTN, HLD, MI s/p stent (in 2014), on ASA and Plavix, morbid obesity s/p gastric sleeve (in March 2019) presented to the ED with Left arm numbness, chest tightness. Patient was at work at 1pm when he started to feel left arm numbness and tingling, followed by chest tightness, and headache. Also endorsed blurred vision, and "brain fog." He reports symptoms lasted about 30 minutes and resolved on its own. Denies chills, fever, n/v/d, Chest pain, SOB, tingling, weakness, or numbness at present. No numbness now. No other complain.   (22 Sep 2023 03:15)      9/22: Pt seen and evaluated. No new complaints.       REVIEW OF SYSTEMS:    CONSTITUTIONAL: No weakness, fevers or chills  EYES/ENT: No visual changes;  No vertigo or throat pain   NECK: No pain or stiffness  RESPIRATORY: No cough, wheezing, hemoptysis; No shortness of breath  CARDIOVASCULAR: No chest pain or palpitations  GASTROINTESTINAL: No abdominal or epigastric pain. No nausea, vomiting, or hematemesis; No diarrhea or constipation. No melena or hematochezia.  GENITOURINARY: No dysuria, frequency or hematuria  NEUROLOGICAL: No numbness or weakness  SKIN: No itching, rashes      MEDICATIONS  (STANDING):  aspirin enteric coated 81 milliGRAM(s) Oral daily  atorvastatin 40 milliGRAM(s) Oral at bedtime  buPROPion XL (24-Hour) . 75 milliGRAM(s) Oral daily  clopidogrel Tablet 75 milliGRAM(s) Oral daily  gabapentin 100 milliGRAM(s) Oral daily  losartan 50 milliGRAM(s) Oral daily  melatonin 10 milliGRAM(s) Oral at bedtime  pantoprazole    Tablet 40 milliGRAM(s) Oral before breakfast    MEDICATIONS  (PRN):      Vital Signs Last 24 Hrs  T(C): 36.7 (21 Sep 2023 22:51), Max: 37 (21 Sep 2023 17:14)  T(F): 98 (21 Sep 2023 22:51), Max: 98.6 (21 Sep 2023 17:14)  HR: 61 (21 Sep 2023 22:51) (50 - 61)  BP: 130/95 (21 Sep 2023 22:51) (121/92 - 140/92)  BP(mean): --  RR: 18 (21 Sep 2023 22:51) (15 - 18)  SpO2: 98% (21 Sep 2023 22:51) (97% - 98%)    Parameters below as of 21 Sep 2023 22:51  Patient On (Oxygen Delivery Method): room air        GEN: NAD, comfortable, resting in bed  HEENT: NC/AT, EOMI, PERRLA, MMM, clear conjunctiva and sclera, normal hearing, no nasal discharge, throat clear, no thrush, normal dentition.   NECK: supple, no JVD, no LAD, no thyromegaly  BACK:  ROM intact, no spinal/paraspinal tenderness  CV: S1S2, RRR, no murmur  RESP: good air movement, CTABL, no rales, rhonchi or wheezing, respirations unlabored  ABD: +BS, soft, ND, NT, no guarding, no rigidity, no HSM  EXT: +2 radial and pedal pulses, no edema, no calve tenderness  SKIN: No visible Rashes or Ulcers  MSK: ROM intact in all extremities  NEURO:  sensory and CN 2-12 Grossly intact, no motor deficits, no, saddle anesthesia, AOx3  PSYCH: normal behavior         LABS:                          15.6   8.52  )-----------( 281      ( 21 Sep 2023 17:25 )             45.0     22 Sep 2023 07:54    143    |  107    |  28     ----------------------------<  85     4.2     |  33     |  1.24     Ca    9.5        22 Sep 2023 07:54    TPro  7.3    /  Alb  3.9    /  TBili  1.0    /  DBili  x      /  AST  36     /  ALT  36     /  AlkPhos  93     21 Sep 2023 17:25    LIVER FUNCTIONS - ( 21 Sep 2023 17:25 )  Alb: 3.9 g/dL / Pro: 7.3 gm/dL / ALK PHOS: 93 U/L / ALT: 36 U/L / AST: 36 U/L / GGT: x           PT/INR - ( 21 Sep 2023 17:25 )   PT: 10.7 sec;   INR: 0.95 ratio         PTT - ( 21 Sep 2023 17:25 )  PTT:29.8 sec  CAPILLARY BLOOD GLUCOSE  84 (21 Sep 2023 18:18)      POCT Blood Glucose.: 84 mg/dL (21 Sep 2023 17:39)        Urinalysis Basic - ( 22 Sep 2023 07:54 )    Color: x / Appearance: x / SG: x / pH: x  Gluc: 85 mg/dL / Ketone: x  / Bili: x / Urobili: x   Blood: x / Protein: x / Nitrite: x   Leuk Esterase: x / RBC: x / WBC x   Sq Epi: x / Non Sq Epi: x / Bacteria: x        RADIOLOGY:  < from: CT Brain Stroke Protocol (09.21.23 @ 17:22) >  IMPRESSION:      1. No CT evidence of an acute infarct, hemorrhage, or space-occupying   lesion.  2. No perfusion abnormality or area of acute penumbra suggested.  3. Atherosclerotic changes and calcified plaque noted in both carotid   bifurcations in the neck, with mild to moderate narrowing of both   proximal internal carotid arteries. Patent vertebrals.  4. Widely patent vasculature noted intracranially.    < from: MR Head No Cont (09.22.23 @ 08:56) >  IMPRESSION:    No acute infarct or other acute abnormality. Very minimal chronic   microvascular ischemicchanges are present.        
Interval History:  9/22/23: No new complaints this morning.  Symptoms have resolved. He denies headache.    MEDICATIONS  (STANDING):  aspirin enteric coated 81 milliGRAM(s) Oral daily  atorvastatin 40 milliGRAM(s) Oral at bedtime  buPROPion XL (24-Hour) . 75 milliGRAM(s) Oral daily  clopidogrel Tablet 75 milliGRAM(s) Oral daily  gabapentin 100 milliGRAM(s) Oral daily  losartan 50 milliGRAM(s) Oral daily  melatonin 10 milliGRAM(s) Oral at bedtime  pantoprazole    Tablet 40 milliGRAM(s) Oral before breakfast    MEDICATIONS  (PRN):      Allergies    No Known Allergies    Intolerances        PHYSICAL EXAM:  Vital Signs Last 24 Hrs  T(F): 98 (09-21-23 @ 22:51)  HR: 61 (09-21-23 @ 22:51)  BP: 130/95 (09-21-23 @ 22:51)  RR: 18 (09-21-23 @ 22:51)    GENERAL: NAD, well-groomed, well-developed  HEAD:  Atraumatic, Normocephalic  Neuro:  Awake, alert, no aphasia  CN: PERRL, EOMI, no nystagmus, no facial weakness, tongue protrudes in the midline  motor: normal tone, no pronator drift, full strength in all four extremities  sensory: intact to light touch  coordination: finger to nose intact bilaterally  DTRs: slightly hypoactive  gait: not tested      LABS:                        15.6   8.52  )-----------( 281      ( 21 Sep 2023 17:25 )             45.0     09-22    143  |  107  |  28<H>  ----------------------------<  85  4.2   |  33<H>  |  1.24    Ca    9.5      22 Sep 2023 07:54    TPro  7.3  /  Alb  3.9  /  TBili  1.0  /  DBili  x   /  AST  36  /  ALT  36  /  AlkPhos  93  09-21    PT/INR - ( 21 Sep 2023 17:25 )   PT: 10.7 sec;   INR: 0.95 ratio         PTT - ( 21 Sep 2023 17:25 )  PTT:29.8 sec  Urinalysis Basic - ( 22 Sep 2023 07:54 )    Color: x / Appearance: x / SG: x / pH: x  Gluc: 85 mg/dL / Ketone: x  / Bili: x / Urobili: x   Blood: x / Protein: x / Nitrite: x   Leuk Esterase: x / RBC: x / WBC x   Sq Epi: x / Non Sq Epi: x / Bacteria: x        RADIOLOGY & ADDITIONAL STUDIES:  CT head, CTA head and neck, CT perfusion 9/21/23:  1. No CTevidence of an acute infarct, hemorrhage, or space-occupying   lesion.  2. No perfusion abnormality or area of acute penumbra suggested.  3. Atherosclerotic changes and calcified plaque noted in both carotid   bifurcations in the neck, with mild to moderate narrowing of both   proximal internal carotid arteries. Patent vertebrals.  4. Widely patent vasculature noted intracranially.      MR head 9/22/23:  No acute infarct or other acute abnormality. Very minimal chronic   microvascular ischemicchanges are present.

## 2023-09-22 NOTE — DISCHARGE NOTE NURSING/CASE MANAGEMENT/SOCIAL WORK - PATIENT PORTAL LINK FT
You can access the FollowMyHealth Patient Portal offered by [facility] by registering at the following website: [email or web address]. By joining [facility]’s FollowMyHealth portal, you will also be able to view your health information using other applications (apps) compatible with our system.

## 2023-09-22 NOTE — DISCHARGE NOTE PROVIDER - NSDCCPGOAL_GEN_ALL_CORE_FT
12/20/2022  Malika Valdez is a 55 y.o., female.  [facility]  Anesthesia Pre-Operative Evaluation       Patient Name: Malika Valdez  YOB: 1967  MRN: [id number]  CSN: [id number]      Code Status: Prior   Date of Procedure: 12/20/2022  Anesthesia: Choice Procedure: Procedure(s) (LRB):  COLONOSCOPY (N/A)  Pre-Operative Diagnosis: Screening for malignant neoplasm of colon [Z12.11]  Proceduralist: Surgeon(s) and Role:     * Elmer Stuart MD - Primary        SUBJECTIVE:   Malika Valdez is a 55 y.o. female who  has a past medical history of Arthritis, Hypertension (11/11/2013), ILD (interstitial lung disease), Insomnia (2/26/2013), Lupus, Obesity (2/26/2013), RA (refractory anemia), Rheumatoid arthritis, Steroid-induced hyperglycemia (3/1/2018), and Thyroid nodule (3/1/2018). No notes on file    she has a current medication list which includes the following long-term medication(s): amlodipine, azathioprine, blood-glucose meter, and diclofenac sodium.   ALLERGIES:     Review of patient's allergies indicates:   Allergen Reactions    Lisinopril Swelling     Mouth swelled, had to go to ED    Ventolin [albuterol] Swelling     Lips - swelling     History:   There are no hospital problems to display for this patient.    Patient Active Problem List   Diagnosis    Class 3 severe obesity in adult    Rheumatoid lung disease with rheumatoid arthritis of unspecified site    Elevated sedimentation rate    Essential hypertension    Vitamin D deficiency disease    Rheumatoid arthritis involving multiple sites with positive rheumatoid factor    Immunosuppression    Morbid obesity with BMI of 50.0-59.9, adult    Lupus erythematosus overlap syndrome    ILD (interstitial lung disease)    Thyroid nodule    Steroid-induced hyperglycemia    Diarrhea    Wears contact lenses     Past Medical  History:   Diagnosis Date    Arthritis     Hypertension 11/11/2013    ILD (interstitial lung disease)     Insomnia 2/26/2013    Lupus     Obesity 2/26/2013    RA (refractory anemia)     Rheumatoid arthritis     Steroid-induced hyperglycemia 3/1/2018    Thyroid nodule 3/1/2018     Surgical History:    has a past surgical history that includes Breast biopsy (Left, 10/13/2019).   Social History:    reports that she has never smoked. She has never used smokeless tobacco. She reports that she does not drink alcohol and does not use drugs.     OBJECTIVE:     Vital Signs (Most Recent):      Last 3 sets of Vitals    Vitals - 1 value per visit 10/10/2022   SYSTOLIC 144   DIASTOLIC 80   Pulse 69   Temp -   SPO2 95   Weight (lb) 354.39   Weight (kg) 160.75   Height 65   BMI (Calculated) 59   VISIT REPORT -   Pain Score  -   Some recent data might be hidden      Vital Signs Range (Last 24H):          There is no height or weight on file to calculate BMI.   Wt Readings from Last 4 Encounters:   11/10/22 (!) 158.8 kg (350 lb)   10/10/22 (!) 160.7 kg (354 lb 6.2 oz)   04/07/22 (!) 161.7 kg (356 lb 7.7 oz)   04/02/22 (!) 155 kg (341 lb 11.4 oz)     Significant Labs:  Lab Results   Component Value Date    WBC 3.97 10/10/2022    WBC 3.97 10/10/2022    HGB 11.6 (L) 10/10/2022    HGB 11.6 (L) 10/10/2022    HCT 38.9 10/10/2022    HCT 38.9 10/10/2022     10/10/2022     10/10/2022     10/10/2022    K 4.0 10/10/2022     10/10/2022    CREATININE 0.8 10/10/2022    BUN 13 10/10/2022    CO2 26 10/10/2022    GLU 88 10/10/2022    CALCIUM 9.8 10/10/2022    MG 1.8 01/01/2018    PHOS 3.3 01/01/2018    ALKPHOS 89 10/10/2022    ALT 7 (L) 10/10/2022    AST 16 10/10/2022    ALBUMIN 3.6 10/10/2022    HGBA1C 5.8 (H) 10/10/2022     (H) 10/10/2022    CPKMB 0.7 12/27/2017    TROPONINI <0.006 12/11/2018    MB 2.1 12/27/2017    BNP 50 08/16/2018    HCGQUANT <2.0 12/01/2007     No LMP recorded (lmp unknown). Patient  is postmenopausal.  No results found for this or any previous visit (from the past 72 hour(s)).    EKG:   Results for orders placed or performed during the hospital encounter of 12/11/18   EKG 12-lead    Collection Time: 12/11/18  2:02 PM    Narrative    Test Reason : R53.83,  Blood Pressure :  Vent. Rate : 061 BPM     Atrial Rate : 061 BPM     P-R Int : 162 ms          QRS Dur : 088 ms      QT Int : 402 ms       P-R-T Axes : 010 009 075 degrees     QTc Int : 404 ms    Normal sinus rhythm  Cannot rule out Anterior infarct ,age undetermined  Abnormal ECG  When compared with ECG of 16-AUG-2018 10:15,  Premature ventricular complexes are no longer Present  Confirmed by Dario FIGUEROA MD, Angel ALTAMIRANO ([id number]) on 12/12/2018 2:50:39 PM    Referred By:             Confirmed By:Angel Bishop III, MD       TTE:  No results found for this or any previous visit.  No results found for: EF   Results for orders placed or performed in visit on 08/22/18   2D echo with color flow doppler   Result Value Ref Range    EF + QEF 60 55 - 65    Mitral Valve Regurgitation TRIVIAL TO MILD     Diastolic Dysfunction Yes (A)     Est. PA Systolic Pressure 38.28     Tricuspid Valve Regurgitation MILD        ASSESSMENT/PLAN:         Pre-op Assessment    I have reviewed the Patient Summary Reports.     I have reviewed the Nursing Notes. I have reviewed the NPO Status.   I have reviewed the Medications.     Review of Systems      Physical Exam  General: Cooperative and Alert    Airway:  Mallampati: III / II  Mouth Opening: Normal  TM Distance: Normal  Tongue: Normal  Neck ROM: Normal ROM    Chest/Lungs:  Normal Respiratory Rate    Heart:  Rate: Normal  Rhythm: Regular Rhythm        Anesthesia Plan  Type of Anesthesia, risks & benefits discussed:    Anesthesia Type: Gen Natural Airway, MAC  Intra-op Monitoring Plan: Standard ASA Monitors  Post Op Pain Control Plan: IV/PO Opioids PRN  Induction:  IV  Informed Consent: Informed consent signed with the  Patient and all parties understand the risks and agree with anesthesia plan.  All questions answered.   ASA Score: 3  Day of Surgery Review of History & Physical: H&P Update referred to the surgeon/provider.    Ready For Surgery From Anesthesia Perspective.     .       To get better and follow your care plan as instructed.

## 2023-09-22 NOTE — H&P ADULT - NSHPPHYSICALEXAM_GEN_ALL_CORE
T(C): 36.7 (09-21-23 @ 22:51), Max: 37 (09-21-23 @ 17:14)  HR: 61 (09-21-23 @ 22:51) (50 - 61)  BP: 130/95 (09-21-23 @ 22:51) (121/92 - 140/92)  RR: 18 (09-21-23 @ 22:51) (15 - 18)  SpO2: 98% (09-21-23 @ 22:51) (97% - 98%)    CONSTITUTIONAL: Well groomed, no apparent distress  EYES: PERRLA and symmetric, EOMI, No conjunctival or scleral injection, non-icteric  ENMT: Oral mucosa with moist membranes. Normal dentition; no pharyngeal injection or exudates             NECK: Supple, symmetric and without tracheal deviation   RESP: No respiratory distress, no use of accessory muscles; CTA b/l, no WRR  CV: RRR, +S1S2, no MRG; no JVD; no peripheral edema  GI: Soft, NT, ND, no rebound, no guarding; no palpable masses;   LYMPH: No cervical LAD or tenderness;   MSK: Normal ROM without pain,, normal muscle strength/tone  SKIN: No rashes or ulcers noted;   NEURO: CN II-XII intact; normal reflexes in upper and lower extremities, sensation intact in upper and lower extremities b/l to light touch   PSYCH: Appropriate insight/judgment; A+O x 3, mood and affect appropriate, recent/remote memory intact

## 2023-09-22 NOTE — H&P ADULT - HISTORY OF PRESENT ILLNESS
59 yo Male with a PMH of HTN, HLD, MI s/p stent (in 2014), on ASA and Plavix, morbid obesity s/p gastric sleeve (in March 2019) presented to the ED with Left arm numbness, chest tightness. Patient was at work at 1pm when he started to feel left arm numbness and tingling, followed by chest tightness, and headache. Also endorsed blurred vision, and "brain fog." He reports symptoms lasted about 30 minutes and resolved on its own. Denies chills, fever, n/v/d, Chest pain, SOB, tingling, weakness, or numbness at present. No numbness now. No other complain.

## 2023-09-22 NOTE — SWALLOW BEDSIDE ASSESSMENT ADULT - SWALLOW EVAL: DIAGNOSIS
1) Pt is alert, interactive and cooperative. His receptive language abilities appeared to be preserved and he was able to verbalize during communicative probes without evidence of a primary motor speech or primary linguistic pathology. Pt is able to verbalize needs and is at reported usual speech-language state.

## 2023-09-22 NOTE — OCCUPATIONAL THERAPY INITIAL EVALUATION ADULT - PRECAUTIONS/LIMITATIONS, REHAB EVAL
diet: DASH/TLC, elevate HOB 30 degrees, supervise meals/aspiration precautions/cardiac precautions/fall precautions/seizure precautions

## 2023-09-22 NOTE — SWALLOW BEDSIDE ASSESSMENT ADULT - SWALLOW EVAL: RECOMMENDED DIET
SUGGEST A REGULAR CONSISTENCY DIET WITH THIN LIQUID TEXTURES AS THE PATIENT APPEARED CLINICALLY TOLERANT OF THESE FOOD CONSISTENCIES FROM AN OROPHARYNGEAL SWALLOWING PERSPECTIVE ON EXAM.

## 2023-09-22 NOTE — DISCHARGE NOTE NURSING/CASE MANAGEMENT/SOCIAL WORK - NSDCPEFALRISK_GEN_ALL_CORE
For information on Fall & Injury Prevention, visit: [email or web address] OR  [email or web address] OR  https://www.cdc.gov/steadi/patient.html

## 2023-09-22 NOTE — OCCUPATIONAL THERAPY INITIAL EVALUATION ADULT - PERTINENT HX OF CURRENT PROBLEM, REHAB EVAL
Per H&P- pt is a 59 y/o male with a PMHx of HTN, HLD, MI s/p stent (in 2014), on ASA and Plavix, morbid obesity s/p gastric sleeve (in March 2019) presented to the ED with Left arm numbness, chest tightness. Patient was at work at 1pm when he started to feel left arm numbness and tingling, followed by chest tightness, and headache. Also endorsed blurred vision, and "brain fog." He reports symptoms lasted about 30 minutes and resolved on its own. Denies chills, fever, n/v/d, Chest pain, SOB, tingling, weakness, or numbness at present. No numbness now. No other complain. MRI and echo ordered.    CT head- 1. No CT evidence of an acute infarct, hemorrhage, or space-occupying lesion. 2. No perfusion abnormality or area of acute penumbra suggested. 3. Atherosclerotic changes and calcified plaque noted in both carotid bifurcations in the neck, with mild to moderate narrowing of both proximal internal carotid arteries. Patent vertebrals. 4. Widely patent vasculature noted intracranially. Per H&P- pt is a 59 y/o male with a PMHx of HTN, HLD, MI s/p stent (in 2014), on ASA and Plavix, morbid obesity s/p gastric sleeve (in March 2019) presented to the ED with Left arm numbness, chest tightness. Patient was at work at 1pm when he started to feel left arm numbness and tingling, followed by chest tightness, and headache. Also endorsed blurred vision, and "brain fog." He reports symptoms lasted about 30 minutes and resolved on its own. Denies chills, fever, n/v/d, Chest pain, SOB, tingling, weakness, or numbness at present. No numbness now. No other complain.     CT head- 1. No CT evidence of an acute infarct, hemorrhage, or space-occupying lesion. 2. No perfusion abnormality or area of acute penumbra suggested. 3. Atherosclerotic changes and calcified plaque noted in both carotid bifurcations in the neck, with mild to moderate narrowing of both proximal internal carotid arteries. Patent vertebrals. 4. Widely patent vasculature noted intracranially.  MRI head: No acute infarct or other acute abnormality. Very minimal chronic microvascular ischemic changes are present.

## 2023-09-22 NOTE — OCCUPATIONAL THERAPY INITIAL EVALUATION ADULT - ADDITIONAL COMMENTS
Pt reports he is  and resides in a  with his wife, has 3 MATT, FOS to bedroom, pt has a tub/shower and walk in shower stall, standard toilet, +, LHD for writing, RHD for other tasks. Pt (I) with all ADL/IADL tasks, walked without AD. +working

## 2023-09-22 NOTE — H&P ADULT - ASSESSMENT
A/P:    1.  TIA vs Migraine  -follow clinically with Neuro checks in telemetry unit  -follow Echo report  -follow MRI brain  -follow labs  -follow PT/OT/Speech eval   -follow Neurologist eval    -continue Aspirin, Statin    2.  Elevated Cr  -mildly elevated  -will hold HCTZ and repeat labs    3.  SCD for DVT ppx    4.  Code status: Full code,

## 2023-09-22 NOTE — DISCHARGE NOTE PROVIDER - HOSPITAL COURSE
57 yo Male with a PMH of HTN, HLD, MI s/p stent (in 2014), on ASA and Plavix, morbid obesity s/p gastric sleeve (in March 2019) presented to the ED with Left arm numbness, chest tightness. Patient was at work at 1pm when he started to feel left arm numbness and tingling, followed by chest tightness, and headache. Also endorsed blurred vision, and "brain fog." He reports symptoms lasted about 30 minutes and resolved on its own. Denies chills, fever, n/v/d, Chest pain, SOB, tingling, weakness, or numbness at present. No numbness now. No other complain.   (22 Sep 2023 03:15)    #TIA  - Head CT: no evidence of an acute infarct, hemorrhage or lesion   - MRI - no acute infarct or abnormality   - neurology consult appreciated, discussed increasing lipitor, pt does not want to at this time   - OT and Speech Pathologist consult appreciated  - echo official ready epnding, as per echo tech: Normal appearing LVEF, no valvular stenosis   - continue Aspirin and Plavix    - NSR on tele     #HLD  - continue Atorvastatin   - DASH diet     #HTN  - continue Losartan     #VTE PPX  - on Plavix      59 yo Male with a PMH of HTN, HLD, MI s/p stent (in 2014), on ASA and Plavix, morbid obesity s/p gastric sleeve (in March 2019) presented to the ED with Left arm numbness, chest tightness. Patient was at work at 1pm when he started to feel left arm numbness and tingling, followed by chest tightness, and headache. Also endorsed blurred vision, and "brain fog." He reports symptoms lasted about 30 minutes and resolved on its own. Denies chills, fever, n/v/d, Chest pain, SOB, tingling, weakness, or numbness at present. No numbness now. No other complain.   (22 Sep 2023 03:15)    #TIA  - Head CT: no evidence of an acute infarct, hemorrhage or lesion   - MRI - no acute infarct or abnormality   - neurology consult appreciated, discussed increasing lipitor to 80mg qhs   - OT and Speech Pathologist consult appreciated  - echo official ready pending, as per echo tech: Normal appearing LVEF, no valvular stenosis   - continue Aspirin and Plavix    - NSR on tele     #HLD  - continue Atorvastatin   - DASH diet     #HTN  - continue Losartan     #VTE PPX  - on Plavix

## 2023-09-22 NOTE — DISCHARGE NOTE PROVIDER - NSDCCPCAREPLAN_GEN_ALL_CORE_FT
PRINCIPAL DISCHARGE DIAGNOSIS  Diagnosis: TIA (transient ischemic attack)  Assessment and Plan of Treatment: CT head and MRI without actue findings, symptoms likely due to transient ischemic attack. Continue with aspirin, plavix and atorvastatin. Return if symptoms recur.     PRINCIPAL DISCHARGE DIAGNOSIS  Diagnosis: TIA (transient ischemic attack)  Assessment and Plan of Treatment: CT head and MRI without actue findings, symptoms likely due to transient ischemic attack. Continue with aspirin, plavix and atorvastatin now increased to 80mg. Return if symptoms recur.

## 2023-09-22 NOTE — PHYSICAL THERAPY INITIAL EVALUATION ADULT - MODALITIES TREATMENT COMMENTS
Pt educated in B.E F.A.S.T. mnemonic for recognizing stroke symptoms and reacting in timely manner and acknowledges understanding

## 2023-09-22 NOTE — SWALLOW BEDSIDE ASSESSMENT ADULT - SLP GENERAL OBSERVATIONS
Pt is alert, interactive and cooperative. His receptive language abilities appeared to be preserved and he was able to verbalize during communicative probes without evidence of a primary motor speech or primary linguistic pathology. Pt is able to verbalize needs and is at reported usual speech-language state.

## 2023-09-22 NOTE — OCCUPATIONAL THERAPY INITIAL EVALUATION ADULT - NSACTIVITYREC_GEN_A_OT
Pt with no OT needs, patient is (I) with ADL and functional mobility tasks. Recommend to return home with family support as needed for higher level ADL/IADL tasks.

## 2023-09-22 NOTE — PROGRESS NOTE ADULT - ASSESSMENT
57 yo Male with a PMH of HTN, HLD, MI s/p stent (in 2014), on ASA and Plavix, morbid obesity s/p gastric sleeve (in March 2019) presented to the ED with Left arm numbness, chest tightness. Patient was at work at 1pm when he started to feel left arm numbness and tingling, followed by chest tightness, and headache. Also endorsed blurred vision, and "brain fog." He reports symptoms lasted about 30 minutes and resolved on its own.     #TIA  - Head CT findings as above- no evidence of an acute infarct, hemorrhage or lesion   - MRI - no acute infarct or abnormality   - neurology consult  - OT and Speech Pathologist consult  - f/u echo  - continue Aspirin and Plavix      #HLD  - continue Atorvastatin   - DASH diet     #HTN  - continue Losartan     #VTE PPX  - on Plavix

## 2023-09-22 NOTE — DISCHARGE NOTE PROVIDER - NSDCFUSCHEDAPPT_GEN_ALL_CORE_FT
Mukund Levin  [facility]  [facility] [address]  Scheduled Appointment: 10/09/2023    Glenn Azevedo  [facility]  [facility] [address]  Scheduled Appointment: 11/03/2023

## 2023-09-22 NOTE — SWALLOW BEDSIDE ASSESSMENT ADULT - COMMENTS
Pt admitted to  due to experiencing LUE tingling sensation, blurred vision and "brain fog" while at work. These symptoms have been improving. Brain MRI negative for acute stroke. Cr somewhat elevated. This profile is superimposed upon a selected prior medical history which is remarkable for CAD s/p MI-stent placement, HTN, HLD, neuropathy, nephrolithiasis, obesity s/p gastric sleeve, and previous right shoulder surgery.

## 2023-09-22 NOTE — DISCHARGE NOTE PROVIDER - NSDCMRMEDTOKEN_GEN_ALL_CORE_FT
aspirin 81 mg oral delayed release tablet: 1 tab(s) orally once a day (at bedtime)  buPROPion 150 mg/12 hours (SR) oral tablet, extended release: 0.5 tab(s) orally once a day  gabapentin 100 mg oral capsule: 1 cap(s) orally 2 times a day *** 1 tab in the AM, 2 tabs at bedtime***  hydroCHLOROthiazide 25 mg oral tablet: 1 tab(s) orally once a day  Lipitor 40 mg oral tablet: 1 tab(s) orally once a day (at bedtime)  losartan 50 mg oral tablet: 1 tab(s) orally once a day  melatonin 10 mg oral tablet: 1 tab(s) orally once a day (at bedtime)  pantoprazole 40 mg oral delayed release tablet: 1 tab(s) orally once a day  Plavix 75 mg oral tablet: 1 tab(s) orally once a day  potassium citrate 15 mEq oral tablet, extended release: 1 tab(s) orally 2 times a day   aspirin 81 mg oral delayed release tablet: 1 tab(s) orally once a day (at bedtime)  atorvastatin 80 mg oral tablet: 1 tab(s) orally once a day (at bedtime)  buPROPion 150 mg/12 hours (SR) oral tablet, extended release: 0.5 tab(s) orally once a day  gabapentin 100 mg oral capsule: 1 cap(s) orally 2 times a day *** 1 tab in the AM, 2 tabs at bedtime***  hydroCHLOROthiazide 25 mg oral tablet: 1 tab(s) orally once a day  losartan 50 mg oral tablet: 1 tab(s) orally once a day  melatonin 10 mg oral tablet: 1 tab(s) orally once a day (at bedtime)  pantoprazole 40 mg oral delayed release tablet: 1 tab(s) orally once a day  Plavix 75 mg oral tablet: 1 tab(s) orally once a day  potassium citrate 15 mEq oral tablet, extended release: 1 tab(s) orally 2 times a day

## 2023-09-22 NOTE — PROGRESS NOTE ADULT - ASSESSMENT
Mr. Ruiz is a 58y male with a PMH of HTN, HLD, MI s/p stent (in 2014), on ASA and Plavix, morbid obesity s/p gastric sleeve (in March 2019) who presents to the ED on 9/21/23 with L arm paresthesias  lasting for about 30 minutes, chest tightness and headache.  CT head did not show any acute pathology.  CTA was negative for any large vessel occlusion.    Symptoms have resolved.  NIH Stroke Scale Score is 0.    TIA vs complex migraine  -MRI brain shows no evidence of infarct.  -Continue aspirin and Plavix  -Continue atorvastatin. Discussed increasing atorvastatin to 80 mg/day. He already feels that he is over medicated. f/u lipid panel  -f/u echocardiogram    -He reports a history of snoring prior to bariatric surgery, no snoring or signs of ROBINA since surgery and 96 lb weight loss.    Discussed with Dr. Bermeo.   Possible discharge today.

## 2023-09-22 NOTE — PHYSICAL THERAPY INITIAL EVALUATION ADULT - DIAGNOSIS, PT EVAL
transient LUE numbness/tingling , r/o TIA/CVA vs migraine HA; h/o HTN,HLD, h/o morbid obesity s/p laparoscopic sleeve gastrectomy 2019

## 2023-09-22 NOTE — SWALLOW BEDSIDE ASSESSMENT ADULT - SWALLOW EVAL: CRITERIA FOR SKILLED INTERVENTION MET
DO NOT FEEL THAT ACUTE SPEECH PATHOLOGY FOLLOW UP WOULD CHANGE CLINICAL MANAGEMENT/OUTCOME IN HOSPITAL SETTING. PT'S SPEECH-LANGUAGE INTEGRITY AND OROPHARYNGEAL SWALLOWING INTEGRITY ARE FUNCTIONAL/AT USUAL STATE/MAXIMIZED. GIVEN ABOVE, THIS SERVICE WILL NOT ACTIVELY FOLLOW. RECONSULT PRN SHOULD STATUS CHANGE AND CONDITION WARRANT.

## 2023-09-22 NOTE — PHYSICAL THERAPY INITIAL EVALUATION ADULT - PERTINENT HX OF CURRENT PROBLEM, REHAB EVAL
While working at ~ 1pm in the afternoon 9/21/23 experienced LUE numbness/tingling lasting ~30 minutes  and subsequently resolved .

## 2023-09-26 ENCOUNTER — TRANSCRIPTION ENCOUNTER (OUTPATIENT)
Age: 58
End: 2023-09-26

## 2023-09-26 DIAGNOSIS — G60.9 HEREDITARY AND IDIOPATHIC NEUROPATHY, UNSPECIFIED: ICD-10-CM

## 2023-09-26 DIAGNOSIS — Z79.82 LONG TERM (CURRENT) USE OF ASPIRIN: ICD-10-CM

## 2023-09-26 DIAGNOSIS — R79.89 OTHER SPECIFIED ABNORMAL FINDINGS OF BLOOD CHEMISTRY: ICD-10-CM

## 2023-09-26 DIAGNOSIS — I10 ESSENTIAL (PRIMARY) HYPERTENSION: ICD-10-CM

## 2023-09-26 DIAGNOSIS — G45.9 TRANSIENT CEREBRAL ISCHEMIC ATTACK, UNSPECIFIED: ICD-10-CM

## 2023-09-26 DIAGNOSIS — I25.2 OLD MYOCARDIAL INFARCTION: ICD-10-CM

## 2023-09-26 DIAGNOSIS — Z79.02 LONG TERM (CURRENT) USE OF ANTITHROMBOTICS/ANTIPLATELETS: ICD-10-CM

## 2023-09-26 DIAGNOSIS — K21.9 GASTRO-ESOPHAGEAL REFLUX DISEASE WITHOUT ESOPHAGITIS: ICD-10-CM

## 2023-09-26 DIAGNOSIS — Z98.84 BARIATRIC SURGERY STATUS: ICD-10-CM

## 2023-09-26 DIAGNOSIS — E78.5 HYPERLIPIDEMIA, UNSPECIFIED: ICD-10-CM

## 2023-09-26 DIAGNOSIS — Z87.442 PERSONAL HISTORY OF URINARY CALCULI: ICD-10-CM

## 2023-09-26 DIAGNOSIS — I25.10 ATHEROSCLEROTIC HEART DISEASE OF NATIVE CORONARY ARTERY WITHOUT ANGINA PECTORIS: ICD-10-CM

## 2023-09-26 DIAGNOSIS — Z95.5 PRESENCE OF CORONARY ANGIOPLASTY IMPLANT AND GRAFT: ICD-10-CM

## 2023-09-26 RX ORDER — BUPROPION HYDROCHLORIDE 150 MG/1
150 TABLET, FILM COATED, EXTENDED RELEASE ORAL
Qty: 90 | Refills: 3 | Status: DISCONTINUED | COMMUNITY
Start: 2023-01-18 | End: 2023-09-26

## 2023-10-09 ENCOUNTER — APPOINTMENT (OUTPATIENT)
Dept: FAMILY MEDICINE | Facility: CLINIC | Age: 58
End: 2023-10-09
Payer: COMMERCIAL

## 2023-10-09 VITALS
DIASTOLIC BLOOD PRESSURE: 82 MMHG | WEIGHT: 220 LBS | HEIGHT: 70 IN | TEMPERATURE: 97 F | BODY MASS INDEX: 31.5 KG/M2 | HEART RATE: 91 BPM | OXYGEN SATURATION: 97 % | SYSTOLIC BLOOD PRESSURE: 120 MMHG

## 2023-10-09 DIAGNOSIS — Z00.00 ENCOUNTER FOR GENERAL ADULT MEDICAL EXAMINATION W/OUT ABNORMAL FINDINGS: ICD-10-CM

## 2023-10-09 DIAGNOSIS — K21.9 GASTRO-ESOPHAGEAL REFLUX DISEASE W/OUT ESOPHAGITIS: ICD-10-CM

## 2023-10-09 DIAGNOSIS — I10 ESSENTIAL (PRIMARY) HYPERTENSION: ICD-10-CM

## 2023-10-09 DIAGNOSIS — I25.10 ATHEROSCLEROTIC HEART DISEASE OF NATIVE CORONARY ARTERY W/OUT ANGINA PECTORIS: ICD-10-CM

## 2023-10-09 DIAGNOSIS — G47.33 OBSTRUCTIVE SLEEP APNEA (ADULT) (PEDIATRIC): ICD-10-CM

## 2023-10-09 DIAGNOSIS — M1A.9XX0 CHRONIC GOUT, UNSPECIFIED, W/OUT TOPHUS (TOPHI): ICD-10-CM

## 2023-10-09 DIAGNOSIS — E11.9 TYPE 2 DIABETES MELLITUS W/OUT COMPLICATIONS: ICD-10-CM

## 2023-10-09 DIAGNOSIS — F41.9 ANXIETY DISORDER, UNSPECIFIED: ICD-10-CM

## 2023-10-09 PROCEDURE — G0008: CPT

## 2023-10-09 PROCEDURE — 99396 PREV VISIT EST AGE 40-64: CPT | Mod: 25

## 2023-10-09 PROCEDURE — 90686 IIV4 VACC NO PRSV 0.5 ML IM: CPT

## 2023-10-09 RX ORDER — METHYLPREDNISOLONE 4 MG/1
4 TABLET ORAL
Qty: 21 | Refills: 0 | Status: COMPLETED | COMMUNITY
Start: 2023-05-18

## 2023-10-09 RX ORDER — PANTOPRAZOLE 40 MG/1
40 TABLET, DELAYED RELEASE ORAL
Qty: 90 | Refills: 0 | Status: COMPLETED | COMMUNITY
Start: 2017-04-24 | End: 2023-10-09

## 2023-10-09 RX ORDER — ATORVASTATIN CALCIUM 40 MG/1
40 TABLET, FILM COATED ORAL
Qty: 30 | Refills: 0 | Status: COMPLETED | COMMUNITY
Start: 2022-10-07

## 2023-10-11 ENCOUNTER — TRANSCRIPTION ENCOUNTER (OUTPATIENT)
Age: 58
End: 2023-10-11

## 2023-10-12 ENCOUNTER — TRANSCRIPTION ENCOUNTER (OUTPATIENT)
Age: 58
End: 2023-10-12

## 2023-10-12 ENCOUNTER — APPOINTMENT (OUTPATIENT)
Dept: FAMILY MEDICINE | Facility: CLINIC | Age: 58
End: 2023-10-12
Payer: COMMERCIAL

## 2023-10-12 PROCEDURE — 36415 COLL VENOUS BLD VENIPUNCTURE: CPT

## 2023-10-13 ENCOUNTER — TRANSCRIPTION ENCOUNTER (OUTPATIENT)
Age: 58
End: 2023-10-13

## 2023-10-13 LAB — PSA SERPL-MCNC: 1.34 NG/ML

## 2023-10-23 ENCOUNTER — APPOINTMENT (OUTPATIENT)
Dept: DERMATOLOGY | Facility: CLINIC | Age: 58
End: 2023-10-23

## 2023-12-01 ENCOUNTER — APPOINTMENT (OUTPATIENT)
Dept: DERMATOLOGY | Facility: CLINIC | Age: 58
End: 2023-12-01
Payer: COMMERCIAL

## 2023-12-01 DIAGNOSIS — L81.4 OTHER MELANIN HYPERPIGMENTATION: ICD-10-CM

## 2023-12-01 DIAGNOSIS — D22.5 MELANOCYTIC NEVI OF TRUNK: ICD-10-CM

## 2023-12-01 DIAGNOSIS — L82.1 OTHER SEBORRHEIC KERATOSIS: ICD-10-CM

## 2023-12-01 PROCEDURE — 99214 OFFICE O/P EST MOD 30 MIN: CPT

## 2023-12-04 ENCOUNTER — APPOINTMENT (OUTPATIENT)
Dept: DERMATOLOGY | Facility: CLINIC | Age: 58
End: 2023-12-04

## 2023-12-25 ENCOUNTER — NON-APPOINTMENT (OUTPATIENT)
Age: 58
End: 2023-12-25

## 2023-12-27 ENCOUNTER — APPOINTMENT (OUTPATIENT)
Dept: FAMILY MEDICINE | Facility: CLINIC | Age: 58
End: 2023-12-27

## 2024-03-10 NOTE — BRIEF OPERATIVE NOTE - SPECIMENS
none please follow-up with your primary care doctor and cardiologist for any further testing they might deem it necessary.  Please return for any new or concerning symptoms

## 2024-05-20 ENCOUNTER — NON-APPOINTMENT (OUTPATIENT)
Age: 59
End: 2024-05-20

## 2024-05-29 ENCOUNTER — OFFICE (OUTPATIENT)
Dept: URBAN - METROPOLITAN AREA CLINIC 102 | Facility: CLINIC | Age: 59
Setting detail: OPHTHALMOLOGY
End: 2024-05-29
Payer: COMMERCIAL

## 2024-05-29 DIAGNOSIS — H02.535: ICD-10-CM

## 2024-05-29 DIAGNOSIS — H02.401: ICD-10-CM

## 2024-05-29 DIAGNOSIS — H02.532: ICD-10-CM

## 2024-05-29 DIAGNOSIS — H02.534: ICD-10-CM

## 2024-05-29 DIAGNOSIS — H02.531: ICD-10-CM

## 2024-05-29 PROBLEM — H35.9 UNSPECIFIED RETINAL DISORDER: Status: ACTIVE | Noted: 2024-05-29

## 2024-05-29 PROBLEM — H52.7 REFRACTIVE ERROR: Status: ACTIVE | Noted: 2024-05-29

## 2024-05-29 PROCEDURE — 92004 COMPRE OPH EXAM NEW PT 1/>: CPT | Performed by: OPHTHALMOLOGY

## 2024-05-29 PROCEDURE — 92285 EXTERNAL OCULAR PHOTOGRAPHY: CPT | Performed by: OPHTHALMOLOGY

## 2024-05-29 ASSESSMENT — LID EXAM ASSESSMENTS
OS_MRD1: 4 MM
OS_INF_SCLERAL_SHOW: 1+
OS_LEVATOR_FUNCTION: 18 MM
OS_COMMENTS: 1+ INF SCLERAL SHO
OS_LID_LAXITY: 2+
OD_LAXITY: 4+
OD_COMMENTS: 1+ INF SCLERAL SHO
OD_LEVATOR_FUNCTION: 18 MM
OD_MRD1: 2 MM
OD_INF_SCLERAL_SHOW: 1+
OD_LID_LAXITY: 2+
OS_LAXITY: 2+

## 2024-05-29 ASSESSMENT — CONFRONTATIONAL VISUAL FIELD TEST (CVF)
OD_FINDINGS: FULL
OS_FINDINGS: FULL

## 2024-05-29 ASSESSMENT — LID POSITION - PTOSIS: OD_PTOSIS: RUL

## 2024-05-29 ASSESSMENT — LID POSITION - COMMENTS: OD_COMMENTS: LASH PTOSIS

## 2024-06-03 ENCOUNTER — TRANSCRIPTION ENCOUNTER (OUTPATIENT)
Age: 59
End: 2024-06-03

## 2024-06-04 ENCOUNTER — TRANSCRIPTION ENCOUNTER (OUTPATIENT)
Age: 59
End: 2024-06-04

## 2024-06-04 RX ORDER — ESCITALOPRAM OXALATE 5 MG/1
5 TABLET ORAL TWICE DAILY
Qty: 180 | Refills: 3 | Status: ACTIVE | COMMUNITY
Start: 2021-06-21 | End: 1900-01-01

## 2024-06-05 ENCOUNTER — TRANSCRIPTION ENCOUNTER (OUTPATIENT)
Age: 59
End: 2024-06-05

## 2024-07-17 ENCOUNTER — OFFICE (OUTPATIENT)
Dept: URBAN - METROPOLITAN AREA CLINIC 102 | Facility: CLINIC | Age: 59
Setting detail: OPHTHALMOLOGY
End: 2024-07-17
Payer: COMMERCIAL

## 2024-07-17 DIAGNOSIS — H02.534: ICD-10-CM

## 2024-07-17 DIAGNOSIS — H02.401: ICD-10-CM

## 2024-07-17 DIAGNOSIS — H35.9: ICD-10-CM

## 2024-07-17 DIAGNOSIS — H04.123: ICD-10-CM

## 2024-07-17 DIAGNOSIS — H02.535: ICD-10-CM

## 2024-07-17 DIAGNOSIS — H02.532: ICD-10-CM

## 2024-07-17 DIAGNOSIS — H02.531: ICD-10-CM

## 2024-07-17 PROCEDURE — 83861 MICROFLUID ANALY TEARS: CPT | Mod: QW | Performed by: OPHTHALMOLOGY

## 2024-07-17 PROCEDURE — 92012 INTRM OPH EXAM EST PATIENT: CPT | Performed by: OPHTHALMOLOGY

## 2024-07-17 ASSESSMENT — LID EXAM ASSESSMENTS
OS_LEVATOR_FUNCTION: 18 MM
OS_MRD1: 4 MM
OD_LID_LAXITY: 2+
OD_INF_SCLERAL_SHOW: 1+
OD_LEVATOR_FUNCTION: 18 MM
OD_LAXITY: 4+
OS_INF_SCLERAL_SHOW: 1+
OD_MRD1: 2 MM
OS_LID_LAXITY: 2+
OS_LAXITY: 2+
OD_COMMENTS: 1+ INF SCLERAL SHO
OS_COMMENTS: 1+ INF SCLERAL SHO

## 2024-07-17 ASSESSMENT — CONFRONTATIONAL VISUAL FIELD TEST (CVF)
OD_FINDINGS: FULL
OS_FINDINGS: FULL

## 2024-07-17 ASSESSMENT — LID POSITION - COMMENTS: OD_COMMENTS: LASH PTOSIS

## 2024-07-17 ASSESSMENT — LID POSITION - PTOSIS: OD_PTOSIS: RUL

## 2024-07-18 ENCOUNTER — OFFICE (OUTPATIENT)
Dept: URBAN - METROPOLITAN AREA CLINIC 12 | Facility: CLINIC | Age: 59
Setting detail: OPHTHALMOLOGY
End: 2024-07-18
Payer: COMMERCIAL

## 2024-07-18 DIAGNOSIS — H40.013: ICD-10-CM

## 2024-07-18 DIAGNOSIS — H35.343: ICD-10-CM

## 2024-07-18 DIAGNOSIS — H25.13: ICD-10-CM

## 2024-07-18 PROCEDURE — 92014 COMPRE OPH EXAM EST PT 1/>: CPT | Performed by: OPHTHALMOLOGY

## 2024-07-18 PROCEDURE — 92133 CPTRZD OPH DX IMG PST SGM ON: CPT | Performed by: OPHTHALMOLOGY

## 2024-07-18 ASSESSMENT — LID EXAM ASSESSMENTS
OD_COMMENTS: 1+ INF SCLERAL SHO
OS_LID_LAXITY: 2+
OD_MRD1: 2 MM
OD_LAXITY: 4+
OS_MRD1: 4 MM
OD_LEVATOR_FUNCTION: 18 MM
OD_INF_SCLERAL_SHOW: 1+
OS_LEVATOR_FUNCTION: 18 MM
OD_LID_LAXITY: 2+
OS_INF_SCLERAL_SHOW: 1+
OS_COMMENTS: 1+ INF SCLERAL SHO
OS_LAXITY: 2+

## 2024-07-18 ASSESSMENT — CONFRONTATIONAL VISUAL FIELD TEST (CVF)
OS_FINDINGS: FULL
OD_FINDINGS: FULL

## 2024-07-18 ASSESSMENT — LID POSITION - PTOSIS: OD_PTOSIS: RUL

## 2024-07-18 ASSESSMENT — LID POSITION - COMMENTS: OD_COMMENTS: LASH PTOSIS

## 2024-07-24 ENCOUNTER — AMBULATORY SURGERY CENTER (OUTPATIENT)
Dept: URBAN - METROPOLITAN AREA SURGERY 4 | Facility: SURGERY | Age: 59
Setting detail: OPHTHALMOLOGY
End: 2024-07-24
Payer: COMMERCIAL

## 2024-07-24 DIAGNOSIS — H02.532: ICD-10-CM

## 2024-07-24 DIAGNOSIS — H02.531: ICD-10-CM

## 2024-07-24 PROCEDURE — 67917 REPAIR EYELID DEFECT: CPT | Mod: E4 | Performed by: OPHTHALMOLOGY

## 2024-07-24 PROCEDURE — 67961 REVISION OF EYELID: CPT | Mod: E3 | Performed by: OPHTHALMOLOGY

## 2024-07-29 ENCOUNTER — OFFICE (OUTPATIENT)
Dept: URBAN - METROPOLITAN AREA CLINIC 100 | Facility: CLINIC | Age: 59
Setting detail: OPHTHALMOLOGY
End: 2024-07-29
Payer: COMMERCIAL

## 2024-07-29 DIAGNOSIS — H02.531: ICD-10-CM

## 2024-07-29 DIAGNOSIS — H02.532: ICD-10-CM

## 2024-07-29 PROBLEM — H25.13 CATARACT; BOTH EYES: Status: ACTIVE | Noted: 2024-07-18

## 2024-07-29 PROBLEM — H40.013 GLAUCOMA SUSPECT, LOW RISK 1-2 FACTORS; BOTH EYES: Status: ACTIVE | Noted: 2024-07-18

## 2024-07-29 PROBLEM — H35.343: Status: ACTIVE | Noted: 2024-07-18

## 2024-07-29 PROBLEM — H02.40 PTOSIS OF EYELID, UNSPECIFIED: Status: ACTIVE | Noted: 2024-07-29

## 2024-07-29 PROCEDURE — 99024 POSTOP FOLLOW-UP VISIT: CPT | Performed by: OPHTHALMOLOGY

## 2024-07-29 ASSESSMENT — LID EXAM ASSESSMENTS
OD_MRD1: 3 MM
OS_INF_SCLERAL_SHOW: 1+
OS_LID_LAXITY: 2+
OD_COMMENTS: ABSENT LAXIT
OD_LAXITY: ABSENT
OS_LEVATOR_FUNCTION: 18 MM
OS_MRD1: 4 MM
OS_LAXITY: 2+
OD_LEVATOR_FUNCTION: 18 MM
OS_COMMENTS: 1+ INF SCLERAL SHO

## 2024-07-29 ASSESSMENT — CONFRONTATIONAL VISUAL FIELD TEST (CVF)
OD_FINDINGS: FULL
OS_FINDINGS: FULL

## 2024-07-29 ASSESSMENT — LID POSITION - PTOSIS: OD_PTOSIS: RUL T

## 2024-07-30 ENCOUNTER — APPOINTMENT (OUTPATIENT)
Dept: FAMILY MEDICINE | Facility: CLINIC | Age: 59
End: 2024-07-30

## 2024-08-19 ENCOUNTER — OFFICE (OUTPATIENT)
Dept: URBAN - METROPOLITAN AREA CLINIC 100 | Facility: CLINIC | Age: 59
Setting detail: OPHTHALMOLOGY
End: 2024-08-19
Payer: COMMERCIAL

## 2024-08-19 DIAGNOSIS — H02.531: ICD-10-CM

## 2024-08-19 DIAGNOSIS — H02.532: ICD-10-CM

## 2024-08-19 DIAGNOSIS — H04.123: ICD-10-CM

## 2024-08-19 PROBLEM — H02.401 PTOSIS OF EYELID, UNSPECIFIED; RIGHT EYE: Status: RESOLVED | Noted: 2024-08-19 | Resolved: 2024-08-19

## 2024-08-19 PROCEDURE — 99024 POSTOP FOLLOW-UP VISIT: CPT | Performed by: OPHTHALMOLOGY

## 2024-08-19 ASSESSMENT — CONFRONTATIONAL VISUAL FIELD TEST (CVF)
OD_FINDINGS: FULL
OS_FINDINGS: FULL

## 2024-08-19 ASSESSMENT — LID EXAM ASSESSMENTS
OS_LID_LAXITY: 2+
OD_COMMENTS: ABSENT LAXIT
OS_LAXITY: 2+
OS_INF_SCLERAL_SHOW: 1+
OS_COMMENTS: 1+ INF SCLERAL SHO
OS_MRD1: 4 MM
OD_MRD1: 3.5 MM
OS_LEVATOR_FUNCTION: 18 MM
OD_LEVATOR_FUNCTION: 18 MM
OD_LAXITY: ABSENT

## 2024-08-19 ASSESSMENT — LID POSITION - PTOSIS: OD_PTOSIS: RUL T

## 2024-08-23 ENCOUNTER — RX RENEWAL (OUTPATIENT)
Age: 59
End: 2024-08-23

## 2024-09-09 ENCOUNTER — APPOINTMENT (OUTPATIENT)
Dept: UROLOGY | Facility: CLINIC | Age: 59
End: 2024-09-09

## 2024-09-16 ENCOUNTER — APPOINTMENT (OUTPATIENT)
Dept: UROLOGY | Facility: CLINIC | Age: 59
End: 2024-09-16
Payer: COMMERCIAL

## 2024-09-16 VITALS
WEIGHT: 201 LBS | BODY MASS INDEX: 28.77 KG/M2 | DIASTOLIC BLOOD PRESSURE: 77 MMHG | HEART RATE: 70 BPM | SYSTOLIC BLOOD PRESSURE: 119 MMHG | HEIGHT: 70 IN

## 2024-09-16 DIAGNOSIS — Z12.5 ENCOUNTER FOR SCREENING FOR MALIGNANT NEOPLASM OF PROSTATE: ICD-10-CM

## 2024-09-16 PROCEDURE — 99213 OFFICE O/P EST LOW 20 MIN: CPT

## 2024-09-16 NOTE — PHYSICAL EXAM
[General Appearance - Well Developed] : well developed [General Appearance - Well Nourished] : well nourished [Normal Appearance] : normal appearance [Well Groomed] : well groomed [General Appearance - In No Acute Distress] : no acute distress [Abdomen Soft] : soft [Abdomen Tenderness] : non-tender [Costovertebral Angle Tenderness] : no ~M costovertebral angle tenderness [Urethral Meatus] : meatus normal [Urinary Bladder Findings] : the bladder was normal on palpation [No Prostate Nodules] : no prostate nodules [Prostate Size ___ gm] : prostate size [unfilled] gm [Edema] : no peripheral edema [] : no respiratory distress [Respiration, Rhythm And Depth] : normal respiratory rhythm and effort [Exaggerated Use Of Accessory Muscles For Inspiration] : no accessory muscle use [Oriented To Time, Place, And Person] : oriented to person, place, and time [Affect] : the affect was normal [Mood] : the mood was normal [Not Anxious] : not anxious [Normal Station and Gait] : the gait and station were normal for the patient's age [No Focal Deficits] : no focal deficits [No Palpable Adenopathy] : no palpable adenopathy

## 2024-09-16 NOTE — HISTORY OF PRESENT ILLNESS
[FreeTextEntry1] : 54M hx of multiple kidney stones seen 10/10/19 with c/o gross hematuria. This began last week. He had similar browish color urine 1 month ago but resolved spontaneously after hydration. He denies the sensation of passing a stone in that period. Pt had a UCx 3 days ago was negative. Symtoms are mild as they have spontaneously resolved. Nothing makes them better, nothing makes them worse. They are associated with nothing.  Denies hx of smoking or personal cancer hx.   11/26/2019: Patient presents for follow up s/p cystoscopy, right URS, right lithotripsy 11/15/19. Patient reports he had some hematuria post op which resolved and he restarted his Plavix post op day 1 and started to have gross hematuria 5 days ago for which he saw his PCP yesterday had a UA and was told to hold his Plavix and was given Rx for  Cipro 250 mg BID x 5 days. Patient states he held his Plavix and currently has no hematuria and is voiding yellow urine. Patient states his associated symptoms include intermittent right flank pain that is "pulsating" in nature. He denies any dysuria. No frequency, no urgency, no hesitancy, no straining. No incontinence. No fevers, no chills, no nausea, no vomiting.  09/22/2022: Patient presents for follow up. He reports intermitent bilateral flank pain. He had RBUS, which showed  10 mm RIGHT renal stone, 8 mm LEFT renal stone. No hydronephrosis.   10/25/2022: Patient presents for follow up. He reports feeling like he is passing tiny crystals. CT showed no significant stone burden so URS was cancelled. No acute  complaints.  09/18/2023: Patient presents for follow up. He reports weak stream, not overly bothersome. Does reports passing small crystals, back pain but not sure if it is orthopedic. Does not feel like his prevoius renal colic. RBUS neg for hydro, small stones bilaterally.   09/16/2024: Patient presents for follow up. He reports mild frequency, but not bothersome. 24 hr urine shows pH 6.5, citrate > 800.

## 2024-09-16 NOTE — ASSESSMENT
[FreeTextEntry1] : 57 yo M with bilateral renal stones. continue potassium citrate. RBUS now. RTO in 6-12 months

## 2024-09-25 ENCOUNTER — TRANSCRIPTION ENCOUNTER (OUTPATIENT)
Age: 59
End: 2024-09-25

## 2024-10-15 ENCOUNTER — APPOINTMENT (OUTPATIENT)
Dept: DERMATOLOGY | Facility: CLINIC | Age: 59
End: 2024-10-15
Payer: COMMERCIAL

## 2024-10-15 DIAGNOSIS — L82.1 OTHER SEBORRHEIC KERATOSIS: ICD-10-CM

## 2024-10-15 DIAGNOSIS — L81.4 OTHER MELANIN HYPERPIGMENTATION: ICD-10-CM

## 2024-10-15 DIAGNOSIS — D22.30 MELANOCYTIC NEVI OF UNSPECIFIED PART OF FACE: ICD-10-CM

## 2024-10-15 PROCEDURE — 99213 OFFICE O/P EST LOW 20 MIN: CPT

## 2024-10-15 NOTE — ASSESSMENT
[FreeTextEntry1] : Complete skin examination is negative for malignancy; Multiple new concerns were addressed and discussed. Therapeutic options and their risks and benefits; along with multiple diagnostic possibilities were discussed at length; risks and benefits of skin biopsy and/or other further study were discussed;  benign dermal nevus of nose;  regressing with age;  no suspicious features  Continue regular exams;  Follow up for TBSE in 1 year

## 2024-10-15 NOTE — PHYSICAL EXAM
[Full Body Skin Exam Performed] : performed [FreeTextEntry3] : Skin examination performed of the face, neck, trunk, arms, legs;  The patient is well, alert and oriented, pleasant and cooperative. Eyelids, conjunctivae, oral mucosa, digits and nails all normal.   No cervical adenopathy.  Normal findings include:  Seborrheic keratoses small regular IDN L nose; few nevi on toes;  Angiomas Lentigines- large solar lentigo R temple  No lesions were suspicious for malignancy.

## 2024-10-15 NOTE — HISTORY OF PRESENT ILLNESS
[de-identified] : Pt. presents for skin check; c/o few spots of concern;  mole on nose Severity:  mild   Modifying factors:  none Associated symptoms:  none Context:  no association with activity

## 2024-11-01 ENCOUNTER — NON-APPOINTMENT (OUTPATIENT)
Age: 59
End: 2024-11-01

## 2024-11-01 DIAGNOSIS — N20.0 CALCULUS OF KIDNEY: ICD-10-CM

## 2024-11-11 ENCOUNTER — NON-APPOINTMENT (OUTPATIENT)
Age: 59
End: 2024-11-11

## 2024-11-18 ENCOUNTER — OFFICE (OUTPATIENT)
Dept: URBAN - METROPOLITAN AREA CLINIC 100 | Facility: CLINIC | Age: 59
Setting detail: OPHTHALMOLOGY
End: 2024-11-18
Payer: COMMERCIAL

## 2024-11-18 DIAGNOSIS — H02.532: ICD-10-CM

## 2024-11-18 DIAGNOSIS — H04.123: ICD-10-CM

## 2024-11-18 DIAGNOSIS — H02.531: ICD-10-CM

## 2024-11-18 PROCEDURE — 92012 INTRM OPH EXAM EST PATIENT: CPT | Performed by: OPHTHALMOLOGY

## 2024-11-18 ASSESSMENT — LID EXAM ASSESSMENTS
OS_LEVATOR_FUNCTION: 18 MM
OS_LID_LAXITY: 2+
OD_LAXITY: ABSENT
OD_MRD1: 3.5 MM
OD_COMMENTS: ABSENT LAXIT
OS_LAXITY: 2+
OD_LEVATOR_FUNCTION: 18 MM
OS_MRD1: 4 MM
OS_INF_SCLERAL_SHOW: 1+
OS_COMMENTS: 1+ INF SCLERAL SHO

## 2024-11-18 ASSESSMENT — REFRACTION_AUTOREFRACTION
OS_AXIS: 170
OD_SPHERE: +0.50
OD_AXIS: 036
OS_CYLINDER: -1.75
OD_CYLINDER: -0.25
OS_SPHERE: +0.75

## 2024-11-18 ASSESSMENT — CONFRONTATIONAL VISUAL FIELD TEST (CVF)
OD_FINDINGS: FULL
OS_FINDINGS: FULL

## 2024-11-18 ASSESSMENT — KERATOMETRY
OD_K2POWER_DIOPTERS: 42.25
OS_K1POWER_DIOPTERS: 41.75
OS_AXISANGLE_DEGREES: 080
OS_K2POWER_DIOPTERS: 43.75
OD_K1POWER_DIOPTERS: 41.50
OD_AXISANGLE_DEGREES: 100

## 2024-11-18 ASSESSMENT — TEAR BREAK UP TIME (TBUT)
OS_TBUT: T
OD_TBUT: T

## 2024-11-18 ASSESSMENT — LID POSITION - PTOSIS: OD_PTOSIS: RUL T

## 2024-11-18 ASSESSMENT — VISUAL ACUITY
OS_BCVA: 20/30
OD_BCVA: 20/40-1

## 2025-02-14 ENCOUNTER — RX RENEWAL (OUTPATIENT)
Age: 60
End: 2025-02-14

## 2025-03-06 NOTE — H&P PST ADULT - VASCULAR
Patient sister Candace calling stating patient  has been feeling fatigued but not more than normal.   She reports she has a friend that worked in health care and suggested a blood glucose test.     Candace reports he has not been experiencing SOB, fever, recent illness, CP, weakness of the face, arm or leg on one side of the body.     She reports he is able to get through his daily activities as normal.   She reports she checks on him daily and brings him food.    Care advice given.  Recommend drinking plenty of fluids, eating a balanced diet, and increasing activity as tolerated.   Call us back if patient symptoms worsen or head to the ED.  Follow up as planned on 5/21/25  Candace verbalizes understanding and agrees with plan of care.    Reason for Disposition   MILD weakness or fatigue with acute minor illness (e.g., colds)    Protocols used: Weakness (Generalized) and Fatigue-A-OH  DISPOSITION: HOME CARE  
Equal and normal pulses (carotid, femoral, dorsalis pedis)

## 2025-05-19 ENCOUNTER — OFFICE (OUTPATIENT)
Dept: URBAN - METROPOLITAN AREA CLINIC 100 | Facility: CLINIC | Age: 60
Setting detail: OPHTHALMOLOGY
End: 2025-05-19
Payer: COMMERCIAL

## 2025-05-19 DIAGNOSIS — H02.401: ICD-10-CM

## 2025-05-19 DIAGNOSIS — H02.532: ICD-10-CM

## 2025-05-19 DIAGNOSIS — H53.40: ICD-10-CM

## 2025-05-19 DIAGNOSIS — H04.123: ICD-10-CM

## 2025-05-19 DIAGNOSIS — H02.531: ICD-10-CM

## 2025-05-19 PROCEDURE — 92285 EXTERNAL OCULAR PHOTOGRAPHY: CPT | Performed by: OPHTHALMOLOGY

## 2025-05-19 PROCEDURE — 92012 INTRM OPH EXAM EST PATIENT: CPT | Performed by: OPHTHALMOLOGY

## 2025-05-19 PROCEDURE — 92081 LIMITED VISUAL FIELD XM: CPT | Performed by: OPHTHALMOLOGY

## 2025-05-19 ASSESSMENT — LID EXAM ASSESSMENTS
OS_COMMENTS: 1+ INF SCLERAL SHO
OD_COMMENTS: ABSENT LAXIT
OD_MRD1: 2 MM
OS_INF_SCLERAL_SHOW: 1+
OS_LAXITY: 2+
OD_LAXITY: ABSENT
OS_MRD1: 4 MM
OS_LID_LAXITY: 2+
OS_LEVATOR_FUNCTION: 18 MM
OD_FRONTALIS_USE: 2+
OS_FRONTALIS_USE: 2+
OD_LEVATOR_FUNCTION: 18 MM

## 2025-05-19 ASSESSMENT — CONFRONTATIONAL VISUAL FIELD TEST (CVF)
OS_FINDINGS: FULL
OD_FINDINGS: FULL

## 2025-05-19 ASSESSMENT — LID POSITION - PTOSIS: OD_PTOSIS: RUL T

## 2025-05-19 ASSESSMENT — TEAR BREAK UP TIME (TBUT)
OS_TBUT: T
OD_TBUT: T

## 2025-05-20 ASSESSMENT — REFRACTION_AUTOREFRACTION
OD_SPHERE: +0.50
OD_CYLINDER: -0.25
OS_SPHERE: +0.75
OS_CYLINDER: -1.75
OS_AXIS: 170
OD_AXIS: 036

## 2025-05-20 ASSESSMENT — VISUAL ACUITY
OS_BCVA: 20/25
OD_BCVA: 20/40-1

## 2025-05-20 ASSESSMENT — KERATOMETRY
OS_AXISANGLE_DEGREES: 080
OD_K2POWER_DIOPTERS: 42.25
OD_K1POWER_DIOPTERS: 41.50
OD_AXISANGLE_DEGREES: 100
OS_K2POWER_DIOPTERS: 43.75
OS_K1POWER_DIOPTERS: 41.75

## 2025-06-12 NOTE — H&P ADULT - NSHPSOURCEINFORD_GEN_ALL_CORE
[FreeTextEntry1] : This note was written by Eriberto Harman on 06/12/2025 acting as scribe for Dr. Zoran Garcia M.D.  I, Zoran Garcia MD, have read and attest that all the information, medical decision making and discharge instructions within are true and accurate. 
Chart(s)/Patient

## 2025-07-25 ASSESSMENT — KERATOMETRY
OD_K1POWER_DIOPTERS: 41.50
OS_K2POWER_DIOPTERS: 43.75
OD_K2POWER_DIOPTERS: 42.25
OD_AXISANGLE_DEGREES: 100
OS_K1POWER_DIOPTERS: 41.75
OS_AXISANGLE_DEGREES: 080

## 2025-08-04 ENCOUNTER — APPOINTMENT (OUTPATIENT)
Dept: GASTROENTEROLOGY | Facility: CLINIC | Age: 60
End: 2025-08-04
Payer: COMMERCIAL

## 2025-08-04 ENCOUNTER — NON-APPOINTMENT (OUTPATIENT)
Age: 60
End: 2025-08-04

## 2025-08-04 VITALS
HEIGHT: 70 IN | WEIGHT: 193 LBS | BODY MASS INDEX: 27.63 KG/M2 | SYSTOLIC BLOOD PRESSURE: 122 MMHG | DIASTOLIC BLOOD PRESSURE: 80 MMHG

## 2025-08-04 DIAGNOSIS — Z12.11 ENCOUNTER FOR SCREENING FOR MALIGNANT NEOPLASM OF COLON: ICD-10-CM

## 2025-08-04 PROCEDURE — 99203 OFFICE O/P NEW LOW 30 MIN: CPT

## 2025-08-04 RX ORDER — POLYETHYLENE GLYCOL 3350, SODIUM SULFATE, POTASSIUM CHLORIDE, MAGNESIUM SULFATE, AND SODIUM CHLORIDE FOR ORAL SOLUTION 178.7-7.3G
178.7 KIT ORAL
Qty: 2 | Refills: 0 | Status: ACTIVE | COMMUNITY
Start: 2025-08-04 | End: 1900-01-01

## 2025-08-20 ENCOUNTER — TRANSCRIPTION ENCOUNTER (OUTPATIENT)
Age: 60
End: 2025-08-20

## 2025-08-22 ENCOUNTER — TRANSCRIPTION ENCOUNTER (OUTPATIENT)
Age: 60
End: 2025-08-22

## 2025-09-03 ENCOUNTER — OUTPATIENT (OUTPATIENT)
Dept: OUTPATIENT SERVICES | Facility: HOSPITAL | Age: 60
LOS: 1 days | End: 2025-09-03
Payer: COMMERCIAL

## 2025-09-03 ENCOUNTER — APPOINTMENT (OUTPATIENT)
Dept: RADIOLOGY | Facility: CLINIC | Age: 60
End: 2025-09-03
Payer: COMMERCIAL

## 2025-09-03 ENCOUNTER — APPOINTMENT (OUTPATIENT)
Dept: ORTHOPEDIC SURGERY | Facility: CLINIC | Age: 60
End: 2025-09-03
Payer: OTHER MISCELLANEOUS

## 2025-09-03 ENCOUNTER — APPOINTMENT (OUTPATIENT)
Dept: ULTRASOUND IMAGING | Facility: CLINIC | Age: 60
End: 2025-09-03
Payer: COMMERCIAL

## 2025-09-03 VITALS — WEIGHT: 193 LBS | HEIGHT: 70 IN | BODY MASS INDEX: 27.63 KG/M2

## 2025-09-03 DIAGNOSIS — Z98.84 BARIATRIC SURGERY STATUS: Chronic | ICD-10-CM

## 2025-09-03 DIAGNOSIS — N20.0 CALCULUS OF KIDNEY: ICD-10-CM

## 2025-09-03 DIAGNOSIS — M79.672 PAIN IN LEFT FOOT: ICD-10-CM

## 2025-09-03 DIAGNOSIS — Z95.5 PRESENCE OF CORONARY ANGIOPLASTY IMPLANT AND GRAFT: Chronic | ICD-10-CM

## 2025-09-03 DIAGNOSIS — Z98.890 OTHER SPECIFIED POSTPROCEDURAL STATES: Chronic | ICD-10-CM

## 2025-09-03 DIAGNOSIS — M72.2 PLANTAR FASCIAL FIBROMATOSIS: ICD-10-CM

## 2025-09-03 DIAGNOSIS — Z00.8 ENCOUNTER FOR OTHER GENERAL EXAMINATION: ICD-10-CM

## 2025-09-03 PROCEDURE — 76770 US EXAM ABDO BACK WALL COMP: CPT

## 2025-09-03 PROCEDURE — 99203 OFFICE O/P NEW LOW 30 MIN: CPT

## 2025-09-03 PROCEDURE — 74018 RADEX ABDOMEN 1 VIEW: CPT | Mod: 26

## 2025-09-03 PROCEDURE — 73630 X-RAY EXAM OF FOOT: CPT | Mod: LT

## 2025-09-03 PROCEDURE — 76770 US EXAM ABDO BACK WALL COMP: CPT | Mod: 26

## 2025-09-03 PROCEDURE — 74018 RADEX ABDOMEN 1 VIEW: CPT

## 2025-09-08 ENCOUNTER — APPOINTMENT (OUTPATIENT)
Dept: UROLOGY | Facility: CLINIC | Age: 60
End: 2025-09-08
Payer: COMMERCIAL

## 2025-09-08 VITALS
HEIGHT: 70 IN | HEART RATE: 62 BPM | RESPIRATION RATE: 16 BRPM | BODY MASS INDEX: 27.92 KG/M2 | DIASTOLIC BLOOD PRESSURE: 84 MMHG | WEIGHT: 195 LBS | SYSTOLIC BLOOD PRESSURE: 133 MMHG

## 2025-09-08 DIAGNOSIS — N40.1 BENIGN PROSTATIC HYPERPLASIA WITH LOWER URINARY TRACT SYMPMS: ICD-10-CM

## 2025-09-08 DIAGNOSIS — N13.8 BENIGN PROSTATIC HYPERPLASIA WITH LOWER URINARY TRACT SYMPMS: ICD-10-CM

## 2025-09-08 DIAGNOSIS — Z12.5 ENCOUNTER FOR SCREENING FOR MALIGNANT NEOPLASM OF PROSTATE: ICD-10-CM

## 2025-09-08 DIAGNOSIS — N20.0 CALCULUS OF KIDNEY: ICD-10-CM

## 2025-09-08 PROCEDURE — 99214 OFFICE O/P EST MOD 30 MIN: CPT

## 2025-09-09 ENCOUNTER — TRANSCRIPTION ENCOUNTER (OUTPATIENT)
Age: 60
End: 2025-09-09

## 2025-09-09 LAB — PSA SERPL-MCNC: 2.29 NG/ML

## 2025-09-11 ENCOUNTER — TRANSCRIPTION ENCOUNTER (OUTPATIENT)
Age: 60
End: 2025-09-11

## 2025-09-17 ENCOUNTER — TRANSCRIPTION ENCOUNTER (OUTPATIENT)
Age: 60
End: 2025-09-17